# Patient Record
Sex: MALE | Race: WHITE | NOT HISPANIC OR LATINO
[De-identification: names, ages, dates, MRNs, and addresses within clinical notes are randomized per-mention and may not be internally consistent; named-entity substitution may affect disease eponyms.]

---

## 2017-05-03 ENCOUNTER — APPOINTMENT (OUTPATIENT)
Dept: INTERNAL MEDICINE | Facility: CLINIC | Age: 82
End: 2017-05-03

## 2017-05-03 ENCOUNTER — NON-APPOINTMENT (OUTPATIENT)
Age: 82
End: 2017-05-03

## 2017-05-03 VITALS
RESPIRATION RATE: 18 BRPM | HEIGHT: 69 IN | SYSTOLIC BLOOD PRESSURE: 100 MMHG | TEMPERATURE: 97.5 F | HEART RATE: 56 BPM | WEIGHT: 151.99 LBS | BODY MASS INDEX: 22.51 KG/M2 | OXYGEN SATURATION: 100 % | DIASTOLIC BLOOD PRESSURE: 66 MMHG

## 2017-05-03 DIAGNOSIS — I70.203 UNSPECIFIED ATHEROSCLEROSIS OF NATIVE ARTERIES OF EXTREMITIES, BILATERAL LEGS: ICD-10-CM

## 2017-05-03 DIAGNOSIS — K44.9 GASTRO-ESOPHAGEAL REFLUX DISEASE W/OUT ESOPHAGITIS: ICD-10-CM

## 2017-05-03 DIAGNOSIS — I65.21 OCCLUSION AND STENOSIS OF RIGHT CAROTID ARTERY: ICD-10-CM

## 2017-05-03 DIAGNOSIS — K21.9 GASTRO-ESOPHAGEAL REFLUX DISEASE W/OUT ESOPHAGITIS: ICD-10-CM

## 2017-05-03 DIAGNOSIS — D50.9 IRON DEFICIENCY ANEMIA, UNSPECIFIED: ICD-10-CM

## 2017-05-03 DIAGNOSIS — G45.9 TRANSIENT CEREBRAL ISCHEMIC ATTACK, UNSPECIFIED: ICD-10-CM

## 2017-05-03 DIAGNOSIS — I50.9 HEART FAILURE, UNSPECIFIED: ICD-10-CM

## 2017-05-03 RX ORDER — INSULIN HUMAN 100 [IU]/ML
100 INJECTION, SOLUTION PARENTERAL
Refills: 0 | Status: COMPLETED | COMMUNITY
End: 2017-05-03

## 2017-05-08 ENCOUNTER — APPOINTMENT (OUTPATIENT)
Dept: NEUROLOGY | Facility: CLINIC | Age: 82
End: 2017-05-08

## 2017-05-08 VITALS
WEIGHT: 152 LBS | DIASTOLIC BLOOD PRESSURE: 70 MMHG | BODY MASS INDEX: 22.51 KG/M2 | HEIGHT: 69 IN | SYSTOLIC BLOOD PRESSURE: 110 MMHG

## 2017-09-12 ENCOUNTER — RECORD ABSTRACTING (OUTPATIENT)
Age: 82
End: 2017-09-12

## 2017-09-12 DIAGNOSIS — Z86.73 PERSONAL HISTORY OF TRANSIENT ISCHEMIC ATTACK (TIA), AND CEREBRAL INFARCTION W/OUT RESIDUAL DEFICITS: ICD-10-CM

## 2017-09-12 DIAGNOSIS — Z92.89 PERSONAL HISTORY OF OTHER MEDICAL TREATMENT: ICD-10-CM

## 2017-09-12 DIAGNOSIS — Z83.1 FAMILY HISTORY OF OTHER INFECTIOUS AND PARASITIC DISEASES: ICD-10-CM

## 2017-09-14 ENCOUNTER — APPOINTMENT (OUTPATIENT)
Dept: INTERNAL MEDICINE | Facility: CLINIC | Age: 82
End: 2017-09-14
Payer: MEDICARE

## 2017-09-14 ENCOUNTER — RESULT CHARGE (OUTPATIENT)
Age: 82
End: 2017-09-14

## 2017-09-14 VITALS
SYSTOLIC BLOOD PRESSURE: 110 MMHG | HEART RATE: 65 BPM | RESPIRATION RATE: 20 BRPM | HEIGHT: 69 IN | DIASTOLIC BLOOD PRESSURE: 66 MMHG | WEIGHT: 146 LBS | OXYGEN SATURATION: 99 % | BODY MASS INDEX: 21.62 KG/M2 | TEMPERATURE: 98.7 F

## 2017-09-14 DIAGNOSIS — N18.2 CHRONIC KIDNEY DISEASE, STAGE 2 (MILD): ICD-10-CM

## 2017-09-14 DIAGNOSIS — I10 ESSENTIAL (PRIMARY) HYPERTENSION: ICD-10-CM

## 2017-09-14 DIAGNOSIS — R63.4 ABNORMAL WEIGHT LOSS: ICD-10-CM

## 2017-09-14 DIAGNOSIS — E78.00 PURE HYPERCHOLESTEROLEMIA, UNSPECIFIED: ICD-10-CM

## 2017-09-14 DIAGNOSIS — R31.29 OTHER MICROSCOPIC HEMATURIA: ICD-10-CM

## 2017-09-14 DIAGNOSIS — J44.9 CHRONIC OBSTRUCTIVE PULMONARY DISEASE, UNSPECIFIED: ICD-10-CM

## 2017-09-14 LAB
25(OH)D3 SERPL-MCNC: 44.3 NG/ML
ALBUMIN SERPL ELPH-MCNC: 4.3 G/DL
ALP BLD-CCNC: 61 U/L
ALT SERPL-CCNC: 23 U/L
ANION GAP SERPL CALC-SCNC: 19 MMOL/L
APPEARANCE: CLEAR
AST SERPL-CCNC: 21 U/L
BACTERIA: NEGATIVE
BASOPHILS # BLD AUTO: 0.02 K/UL
BASOPHILS NFR BLD AUTO: 0.5 %
BILIRUB SERPL-MCNC: 1.2 MG/DL
BILIRUBIN URINE: NEGATIVE
BLOOD URINE: NEGATIVE
BUN SERPL-MCNC: 57 MG/DL
CALCIUM SERPL-MCNC: 10.3 MG/DL
CHLORIDE SERPL-SCNC: 102 MMOL/L
CHOLEST SERPL-MCNC: 267 MG/DL
CHOLEST/HDLC SERPL: 4.2 RATIO
CO2 SERPL-SCNC: 25 MMOL/L
COLOR: YELLOW
CREAT SERPL-MCNC: 2.1 MG/DL
EOSINOPHIL # BLD AUTO: 0.07 K/UL
EOSINOPHIL NFR BLD AUTO: 1.7 %
FERRITIN SERPL-MCNC: 117 NG/ML
GLUCOSE BLDC GLUCOMTR-MCNC: 159
GLUCOSE QUALITATIVE U: NORMAL MG/DL
GLUCOSE SERPL-MCNC: 134 MG/DL
HCT VFR BLD CALC: 39.3 %
HDLC SERPL-MCNC: 64 MG/DL
HGB BLD-MCNC: 12.7 G/DL
IMM GRANULOCYTES NFR BLD AUTO: 0 %
IRON SATN MFR SERPL: 34 %
IRON SERPL-MCNC: 86 UG/DL
KETONES URINE: NEGATIVE
LDLC SERPL CALC-MCNC: 179 MG/DL
LEUKOCYTE ESTERASE URINE: NEGATIVE
LYMPHOCYTES # BLD AUTO: 0.6 K/UL
LYMPHOCYTES NFR BLD AUTO: 14.3 %
MAN DIFF?: NORMAL
MCHC RBC-ENTMCNC: 30.8 PG
MCHC RBC-ENTMCNC: 32.3 GM/DL
MCV RBC AUTO: 95.2 FL
MICROSCOPIC-UA: NORMAL
MONOCYTES # BLD AUTO: 0.33 K/UL
MONOCYTES NFR BLD AUTO: 7.9 %
NEUTROPHILS # BLD AUTO: 3.18 K/UL
NEUTROPHILS NFR BLD AUTO: 75.6 %
NITRITE URINE: NEGATIVE
PH URINE: 6
PLATELET # BLD AUTO: 140 K/UL
POTASSIUM SERPL-SCNC: 4.3 MMOL/L
PROT SERPL-MCNC: 6.9 G/DL
PROTEIN URINE: NEGATIVE MG/DL
RBC # BLD: 4.13 M/UL
RBC # FLD: 14.8 %
RED BLOOD CELLS URINE: 1 /HPF
SODIUM SERPL-SCNC: 146 MMOL/L
SPECIFIC GRAVITY URINE: 1.01
SQUAMOUS EPITHELIAL CELLS: 0 /HPF
TIBC SERPL-MCNC: 256 UG/DL
TRIGL SERPL-MCNC: 119 MG/DL
TSH SERPL-ACNC: 2.85 UIU/ML
UIBC SERPL-MCNC: 170 UG/DL
UROBILINOGEN URINE: NORMAL MG/DL
WBC # FLD AUTO: 4.2 K/UL
WHITE BLOOD CELLS URINE: 0 /HPF

## 2017-09-14 PROCEDURE — ZZZZZ: CPT

## 2017-09-14 PROCEDURE — 94729 DIFFUSING CAPACITY: CPT

## 2017-09-14 PROCEDURE — 99215 OFFICE O/P EST HI 40 MIN: CPT | Mod: 25

## 2017-09-14 PROCEDURE — 82962 GLUCOSE BLOOD TEST: CPT

## 2017-09-14 PROCEDURE — 94060 EVALUATION OF WHEEZING: CPT

## 2017-09-14 PROCEDURE — 94727 GAS DIL/WSHOT DETER LNG VOL: CPT

## 2017-09-14 RX ORDER — INSULIN HUMAN 100 [IU]/ML
100 INJECTION, SUSPENSION SUBCUTANEOUS
Refills: 0 | Status: COMPLETED | COMMUNITY
End: 2017-09-14

## 2017-09-14 RX ORDER — ASPIRIN 81 MG/1
81 TABLET ORAL DAILY
Qty: 100 | Refills: 2 | Status: COMPLETED | COMMUNITY
Start: 2017-09-14 | End: 2017-09-14

## 2017-11-29 ENCOUNTER — APPOINTMENT (OUTPATIENT)
Dept: NEUROLOGY | Facility: CLINIC | Age: 82
End: 2017-11-29
Payer: MEDICARE

## 2017-11-29 VITALS
SYSTOLIC BLOOD PRESSURE: 112 MMHG | DIASTOLIC BLOOD PRESSURE: 56 MMHG | WEIGHT: 138 LBS | BODY MASS INDEX: 20.44 KG/M2 | HEIGHT: 69 IN

## 2017-11-29 PROCEDURE — 99214 OFFICE O/P EST MOD 30 MIN: CPT

## 2017-11-29 RX ORDER — METOPROLOL SUCCINATE 25 MG/1
25 TABLET, EXTENDED RELEASE ORAL DAILY
Refills: 0 | Status: DISCONTINUED | COMMUNITY
End: 2017-11-29

## 2017-11-29 RX ORDER — ALFUZOSIN HYDROCHLORIDE 10 MG/1
10 TABLET, EXTENDED RELEASE ORAL
Refills: 0 | Status: DISCONTINUED | COMMUNITY
End: 2017-11-29

## 2018-01-24 ENCOUNTER — LABORATORY RESULT (OUTPATIENT)
Age: 83
End: 2018-01-24

## 2018-01-24 LAB
APPEARANCE: CLEAR
B BURGDOR IGG+IGM SER QL IB: NORMAL
BACTERIA: NEGATIVE
BASOPHILS # BLD AUTO: 0.01 K/UL
BASOPHILS NFR BLD AUTO: 0.3 %
BILIRUBIN URINE: NEGATIVE
BLOOD URINE: NEGATIVE
CHOLEST SERPL-MCNC: 187 MG/DL
CHOLEST/HDLC SERPL: 2.4 RATIO
CK SERPL-CCNC: 77 U/L
COLOR: YELLOW
CRP SERPL-MCNC: <0.2 MG/DL
EOSINOPHIL # BLD AUTO: 0.03 K/UL
EOSINOPHIL NFR BLD AUTO: 0.8 %
ERYTHROCYTE [SEDIMENTATION RATE] IN BLOOD BY WESTERGREN METHOD: 3 MM/HR
FERRITIN SERPL-MCNC: 238 NG/ML
FOLATE SERPL-MCNC: >20 NG/ML
GLUCOSE QUALITATIVE U: NEGATIVE MG/DL
HBA1C MFR BLD HPLC: 7.6 %
HCT VFR BLD CALC: 36.5 %
HDLC SERPL-MCNC: 79 MG/DL
HGB BLD-MCNC: 12.4 G/DL
HYALINE CASTS: 0 /LPF
IMM GRANULOCYTES NFR BLD AUTO: 0 %
IRON SATN MFR SERPL: 34 %
IRON SERPL-MCNC: 86 UG/DL
KETONES URINE: NEGATIVE
LDLC SERPL CALC-MCNC: 88 MG/DL
LEUKOCYTE ESTERASE URINE: NEGATIVE
LYMPHOCYTES # BLD AUTO: 0.52 K/UL
LYMPHOCYTES NFR BLD AUTO: 13 %
MAN DIFF?: NORMAL
MCHC RBC-ENTMCNC: 31.7 PG
MCHC RBC-ENTMCNC: 34 GM/DL
MCV RBC AUTO: 93.4 FL
MICROSCOPIC-UA: NORMAL
MONOCYTES # BLD AUTO: 0.28 K/UL
MONOCYTES NFR BLD AUTO: 7 %
NEUTROPHILS # BLD AUTO: 3.16 K/UL
NEUTROPHILS NFR BLD AUTO: 78.9 %
NITRITE URINE: NEGATIVE
PH URINE: 5
PLATELET # BLD AUTO: 148 K/UL
PROTEIN URINE: NEGATIVE MG/DL
RBC # BLD: 3.91 M/UL
RBC # FLD: 14.5 %
RED BLOOD CELLS URINE: 0 /HPF
SPECIFIC GRAVITY URINE: 1.02
SQUAMOUS EPITHELIAL CELLS: 0 /HPF
TIBC SERPL-MCNC: 255 UG/DL
TRIGL SERPL-MCNC: 98 MG/DL
TSH SERPL-ACNC: 2.15 UIU/ML
UIBC SERPL-MCNC: 169 UG/DL
UROBILINOGEN URINE: NEGATIVE MG/DL
VIT B12 SERPL-MCNC: 1694 PG/ML
WBC # FLD AUTO: 4 K/UL
WHITE BLOOD CELLS URINE: 0 /HPF

## 2018-01-26 LAB
ANA SER IF-ACNC: NEGATIVE
BACTERIA UR CULT: NORMAL

## 2018-01-29 LAB — VIT B6 SERPL-MCNC: 72.9 UG/L

## 2018-02-27 ENCOUNTER — OUTPATIENT (OUTPATIENT)
Dept: OUTPATIENT SERVICES | Facility: HOSPITAL | Age: 83
LOS: 1 days | Discharge: HOME | End: 2018-02-27
Payer: MEDICARE

## 2018-02-27 VITALS
OXYGEN SATURATION: 100 % | WEIGHT: 121.92 LBS | DIASTOLIC BLOOD PRESSURE: 68 MMHG | HEIGHT: 69 IN | SYSTOLIC BLOOD PRESSURE: 106 MMHG | TEMPERATURE: 97 F | HEART RATE: 59 BPM | RESPIRATION RATE: 20 BRPM

## 2018-02-27 DIAGNOSIS — G47.33 OBSTRUCTIVE SLEEP APNEA (ADULT) (PEDIATRIC): ICD-10-CM

## 2018-02-27 DIAGNOSIS — R93.5 ABNORMAL FINDINGS ON DIAGNOSTIC IMAGING OF OTHER ABDOMINAL REGIONS, INCLUDING RETROPERITONEUM: ICD-10-CM

## 2018-02-27 DIAGNOSIS — Z01.818 ENCOUNTER FOR OTHER PREPROCEDURAL EXAMINATION: ICD-10-CM

## 2018-02-27 DIAGNOSIS — K44.9 DIAPHRAGMATIC HERNIA WITHOUT OBSTRUCTION OR GANGRENE: ICD-10-CM

## 2018-02-27 DIAGNOSIS — Z98.89 OTHER SPECIFIED POSTPROCEDURAL STATES: Chronic | ICD-10-CM

## 2018-02-27 DIAGNOSIS — Z98.890 OTHER SPECIFIED POSTPROCEDURAL STATES: Chronic | ICD-10-CM

## 2018-02-27 DIAGNOSIS — Z95.1 PRESENCE OF AORTOCORONARY BYPASS GRAFT: Chronic | ICD-10-CM

## 2018-02-27 DIAGNOSIS — K25.4 CHRONIC OR UNSPECIFIED GASTRIC ULCER WITH HEMORRHAGE: ICD-10-CM

## 2018-02-27 DIAGNOSIS — E11.9 TYPE 2 DIABETES MELLITUS WITHOUT COMPLICATIONS: ICD-10-CM

## 2018-02-27 DIAGNOSIS — K31.9 DISEASE OF STOMACH AND DUODENUM, UNSPECIFIED: ICD-10-CM

## 2018-02-27 DIAGNOSIS — R63.4 ABNORMAL WEIGHT LOSS: ICD-10-CM

## 2018-02-27 DIAGNOSIS — Z95.1 PRESENCE OF AORTOCORONARY BYPASS GRAFT: ICD-10-CM

## 2018-02-27 DIAGNOSIS — I48.91 UNSPECIFIED ATRIAL FIBRILLATION: ICD-10-CM

## 2018-02-27 LAB
ANION GAP SERPL CALC-SCNC: 5 MMOL/L — SIGNIFICANT CHANGE UP (ref 5–17)
APTT BLD: 50.2 SEC — HIGH (ref 27.5–37.4)
BASOPHILS # BLD AUTO: 0 K/UL — SIGNIFICANT CHANGE UP (ref 0–0.2)
BASOPHILS NFR BLD AUTO: 0.8 % — SIGNIFICANT CHANGE UP (ref 0–2)
BUN SERPL-MCNC: 71 MG/DL — HIGH (ref 7–23)
CALCIUM SERPL-MCNC: 9.9 MG/DL — SIGNIFICANT CHANGE UP (ref 8.5–10.1)
CHLORIDE SERPL-SCNC: 100 MMOL/L — SIGNIFICANT CHANGE UP (ref 96–108)
CO2 SERPL-SCNC: 31 MMOL/L — SIGNIFICANT CHANGE UP (ref 22–31)
CREAT SERPL-MCNC: 1.69 MG/DL — HIGH (ref 0.5–1.3)
EOSINOPHIL # BLD AUTO: 0.1 K/UL — SIGNIFICANT CHANGE UP (ref 0–0.5)
EOSINOPHIL NFR BLD AUTO: 1 % — SIGNIFICANT CHANGE UP (ref 0–6)
GLUCOSE SERPL-MCNC: 153 MG/DL — HIGH (ref 70–99)
HCT VFR BLD CALC: 42 % — SIGNIFICANT CHANGE UP (ref 39–50)
HGB BLD-MCNC: 14.5 G/DL — SIGNIFICANT CHANGE UP (ref 13–17)
INR BLD: 3.23 RATIO — HIGH (ref 0.88–1.16)
LYMPHOCYTES # BLD AUTO: 0.5 K/UL — LOW (ref 1–3.3)
LYMPHOCYTES # BLD AUTO: 8.4 % — LOW (ref 13–44)
MCHC RBC-ENTMCNC: 32.5 PG — SIGNIFICANT CHANGE UP (ref 27–34)
MCHC RBC-ENTMCNC: 34.4 GM/DL — SIGNIFICANT CHANGE UP (ref 32–36)
MCV RBC AUTO: 94.5 FL — SIGNIFICANT CHANGE UP (ref 80–100)
MONOCYTES # BLD AUTO: 0.3 K/UL — SIGNIFICANT CHANGE UP (ref 0–0.9)
MONOCYTES NFR BLD AUTO: 5.6 % — SIGNIFICANT CHANGE UP (ref 2–14)
NEUTROPHILS # BLD AUTO: 4.7 K/UL — SIGNIFICANT CHANGE UP (ref 1.8–7.4)
NEUTROPHILS NFR BLD AUTO: 84.3 % — HIGH (ref 43–77)
PLATELET # BLD AUTO: 180 K/UL — SIGNIFICANT CHANGE UP (ref 150–400)
POTASSIUM SERPL-MCNC: 4.3 MMOL/L — SIGNIFICANT CHANGE UP (ref 3.5–5.3)
POTASSIUM SERPL-SCNC: 4.3 MMOL/L — SIGNIFICANT CHANGE UP (ref 3.5–5.3)
PROTHROM AB SERPL-ACNC: 35.7 SEC — HIGH (ref 9.8–12.7)
RBC # BLD: 4.45 M/UL — SIGNIFICANT CHANGE UP (ref 4.2–5.8)
RBC # FLD: 13.4 % — SIGNIFICANT CHANGE UP (ref 10.3–14.5)
SODIUM SERPL-SCNC: 136 MMOL/L — SIGNIFICANT CHANGE UP (ref 135–145)
WBC # BLD: 5.6 K/UL — SIGNIFICANT CHANGE UP (ref 3.8–10.5)
WBC # FLD AUTO: 5.6 K/UL — SIGNIFICANT CHANGE UP (ref 3.8–10.5)

## 2018-02-27 PROCEDURE — 93010 ELECTROCARDIOGRAM REPORT: CPT

## 2018-02-27 NOTE — H&P PST ADULT - ASSESSMENT
84 y/o male with complain of weight loss and abnormal abdominal imaging studies. Scheduled for EGD with Dr. Su.    Plan:    1. NPO post midnight  2. Follow preop medication (coumadin) instructions from Dr. Su

## 2018-02-27 NOTE — H&P PST ADULT - NSANTHOSAYNRD_GEN_A_CORE
Patient is calling to request an order to be placed for Dr. Nilton Wooten at the [facility] location.  Patient is asking that we call her back when the order is place so she knows when to call Dr. Wooten office for an appointment.   Yes

## 2018-02-27 NOTE — H&P PST ADULT - PMH
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

## 2018-02-27 NOTE — H&P PST ADULT - HISTORY OF PRESENT ILLNESS
84 y/o male with complain of weight loss and abnormal abdominal imaging studies. Denies abdominal pain, no N/V. Here today for PST for EGD.

## 2018-02-27 NOTE — H&P PST ADULT - PSH
H/O cystoscopy  TURP  H/O heart surgery  Clip  History of hernia surgery    History of tonsillectomy    S/P CABG x 4

## 2018-03-07 ENCOUNTER — RESULT REVIEW (OUTPATIENT)
Age: 83
End: 2018-03-07

## 2018-03-07 ENCOUNTER — OUTPATIENT (OUTPATIENT)
Dept: OUTPATIENT SERVICES | Facility: HOSPITAL | Age: 83
LOS: 1 days | Discharge: HOME | End: 2018-03-07
Payer: MEDICARE

## 2018-03-07 VITALS
HEART RATE: 51 BPM | DIASTOLIC BLOOD PRESSURE: 79 MMHG | SYSTOLIC BLOOD PRESSURE: 141 MMHG | RESPIRATION RATE: 16 BRPM | HEIGHT: 69 IN | WEIGHT: 128.09 LBS | OXYGEN SATURATION: 100 % | TEMPERATURE: 97 F

## 2018-03-07 DIAGNOSIS — Z98.890 OTHER SPECIFIED POSTPROCEDURAL STATES: Chronic | ICD-10-CM

## 2018-03-07 DIAGNOSIS — Z98.89 OTHER SPECIFIED POSTPROCEDURAL STATES: Chronic | ICD-10-CM

## 2018-03-07 DIAGNOSIS — Z95.1 PRESENCE OF AORTOCORONARY BYPASS GRAFT: Chronic | ICD-10-CM

## 2018-03-07 PROCEDURE — 88305 TISSUE EXAM BY PATHOLOGIST: CPT | Mod: 26

## 2018-03-07 RX ORDER — SODIUM CHLORIDE 9 MG/ML
1000 INJECTION INTRAMUSCULAR; INTRAVENOUS; SUBCUTANEOUS
Qty: 0 | Refills: 0 | Status: DISCONTINUED | OUTPATIENT
Start: 2018-03-07 | End: 2018-03-22

## 2018-03-07 NOTE — ASU PATIENT PROFILE, ADULT - PMH
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

## 2018-03-08 LAB — SURGICAL PATHOLOGY FINAL REPORT - CH: SIGNIFICANT CHANGE UP

## 2018-03-14 DIAGNOSIS — Z85.828 PERSONAL HISTORY OF OTHER MALIGNANT NEOPLASM OF SKIN: ICD-10-CM

## 2018-03-14 DIAGNOSIS — Z86.73 PERSONAL HISTORY OF TRANSIENT ISCHEMIC ATTACK (TIA), AND CEREBRAL INFARCTION WITHOUT RESIDUAL DEFICITS: ICD-10-CM

## 2018-03-14 DIAGNOSIS — Z79.01 LONG TERM (CURRENT) USE OF ANTICOAGULANTS: ICD-10-CM

## 2018-03-14 DIAGNOSIS — I48.91 UNSPECIFIED ATRIAL FIBRILLATION: ICD-10-CM

## 2018-03-14 DIAGNOSIS — R93.3 ABNORMAL FINDINGS ON DIAGNOSTIC IMAGING OF OTHER PARTS OF DIGESTIVE TRACT: ICD-10-CM

## 2018-03-14 DIAGNOSIS — K25.4 CHRONIC OR UNSPECIFIED GASTRIC ULCER WITH HEMORRHAGE: ICD-10-CM

## 2018-03-14 DIAGNOSIS — E78.00 PURE HYPERCHOLESTEROLEMIA, UNSPECIFIED: ICD-10-CM

## 2018-03-14 DIAGNOSIS — Z79.4 LONG TERM (CURRENT) USE OF INSULIN: ICD-10-CM

## 2018-03-14 DIAGNOSIS — Z86.010 PERSONAL HISTORY OF COLONIC POLYPS: ICD-10-CM

## 2018-03-14 DIAGNOSIS — K44.9 DIAPHRAGMATIC HERNIA WITHOUT OBSTRUCTION OR GANGRENE: ICD-10-CM

## 2018-03-14 DIAGNOSIS — E11.9 TYPE 2 DIABETES MELLITUS WITHOUT COMPLICATIONS: ICD-10-CM

## 2018-03-14 DIAGNOSIS — K31.9 DISEASE OF STOMACH AND DUODENUM, UNSPECIFIED: ICD-10-CM

## 2018-03-14 DIAGNOSIS — G47.33 OBSTRUCTIVE SLEEP APNEA (ADULT) (PEDIATRIC): ICD-10-CM

## 2018-03-14 DIAGNOSIS — R63.4 ABNORMAL WEIGHT LOSS: ICD-10-CM

## 2018-03-14 DIAGNOSIS — Z95.1 PRESENCE OF AORTOCORONARY BYPASS GRAFT: ICD-10-CM

## 2018-03-15 ENCOUNTER — APPOINTMENT (OUTPATIENT)
Dept: INTERNAL MEDICINE | Facility: CLINIC | Age: 83
End: 2018-03-15
Payer: MEDICARE

## 2018-03-15 VITALS
SYSTOLIC BLOOD PRESSURE: 142 MMHG | BODY MASS INDEX: 18.37 KG/M2 | OXYGEN SATURATION: 99 % | HEART RATE: 56 BPM | WEIGHT: 124 LBS | TEMPERATURE: 97.4 F | HEIGHT: 69 IN | RESPIRATION RATE: 14 BRPM | DIASTOLIC BLOOD PRESSURE: 80 MMHG

## 2018-03-15 DIAGNOSIS — Z00.00 ENCOUNTER FOR GENERAL ADULT MEDICAL EXAMINATION W/OUT ABNORMAL FINDINGS: ICD-10-CM

## 2018-03-15 PROCEDURE — G0438: CPT

## 2018-03-15 RX ORDER — DUTASTERIDE 0.5 MG/1
0.5 CAPSULE, LIQUID FILLED ORAL
Refills: 0 | Status: DISCONTINUED | COMMUNITY
End: 2018-03-15

## 2018-03-27 ENCOUNTER — EMERGENCY (EMERGENCY)
Facility: HOSPITAL | Age: 83
LOS: 0 days | Discharge: HOME | End: 2018-03-27
Attending: EMERGENCY MEDICINE | Admitting: EMERGENCY MEDICINE
Payer: MEDICARE

## 2018-03-27 VITALS
WEIGHT: 149.91 LBS | HEIGHT: 72 IN | RESPIRATION RATE: 25 BRPM | DIASTOLIC BLOOD PRESSURE: 73 MMHG | TEMPERATURE: 99 F | SYSTOLIC BLOOD PRESSURE: 143 MMHG | HEART RATE: 73 BPM

## 2018-03-27 DIAGNOSIS — Z95.1 PRESENCE OF AORTOCORONARY BYPASS GRAFT: ICD-10-CM

## 2018-03-27 DIAGNOSIS — E78.5 HYPERLIPIDEMIA, UNSPECIFIED: ICD-10-CM

## 2018-03-27 DIAGNOSIS — Z79.84 LONG TERM (CURRENT) USE OF ORAL HYPOGLYCEMIC DRUGS: ICD-10-CM

## 2018-03-27 DIAGNOSIS — N40.0 BENIGN PROSTATIC HYPERPLASIA WITHOUT LOWER URINARY TRACT SYMPTOMS: ICD-10-CM

## 2018-03-27 DIAGNOSIS — I12.9 HYPERTENSIVE CHRONIC KIDNEY DISEASE WITH STAGE 1 THROUGH STAGE 4 CHRONIC KIDNEY DISEASE, OR UNSPECIFIED CHRONIC KIDNEY DISEASE: ICD-10-CM
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
## 2018-03-27 DIAGNOSIS — S01.81XA LACERATION WITHOUT FOREIGN BODY OF OTHER PART OF HEAD, INITIAL ENCOUNTER: ICD-10-CM

## 2018-03-27 DIAGNOSIS — I48.2 CHRONIC ATRIAL FIBRILLATION: ICD-10-CM

## 2018-03-27 DIAGNOSIS — E11.22 TYPE 2 DIABETES MELLITUS WITH DIABETIC CHRONIC KIDNEY DISEASE: ICD-10-CM

## 2018-03-27 DIAGNOSIS — I25.10 ATHEROSCLEROTIC HEART DISEASE OF NATIVE CORONARY ARTERY WITHOUT ANGINA PECTORIS: ICD-10-CM

## 2018-03-27 DIAGNOSIS — S01.01XA LACERATION WITHOUT FOREIGN BODY OF SCALP, INITIAL ENCOUNTER: ICD-10-CM

## 2018-03-27 DIAGNOSIS — Q27.30 ARTERIOVENOUS MALFORMATION, SITE UNSPECIFIED: ICD-10-CM

## 2018-03-27 DIAGNOSIS — Z98.890 OTHER SPECIFIED POSTPROCEDURAL STATES: Chronic | ICD-10-CM

## 2018-03-27 DIAGNOSIS — R55 SYNCOPE AND COLLAPSE: ICD-10-CM

## 2018-03-27 DIAGNOSIS — Z98.89 OTHER SPECIFIED POSTPROCEDURAL STATES: Chronic | ICD-10-CM

## 2018-03-27 DIAGNOSIS — Z95.1 PRESENCE OF AORTOCORONARY BYPASS GRAFT: Chronic | ICD-10-CM

## 2018-03-27 DIAGNOSIS — Z79.01 LONG TERM (CURRENT) USE OF ANTICOAGULANTS: ICD-10-CM
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
## 2018-03-27 DIAGNOSIS — Z79.899 OTHER LONG TERM (CURRENT) DRUG THERAPY: ICD-10-CM

## 2018-03-27 LAB
ALBUMIN SERPL ELPH-MCNC: 3.6 G/DL — SIGNIFICANT CHANGE UP (ref 3.3–5)
ALP SERPL-CCNC: 77 U/L — SIGNIFICANT CHANGE UP (ref 40–120)
ALT FLD-CCNC: 31 U/L — SIGNIFICANT CHANGE UP (ref 12–78)
ANION GAP SERPL CALC-SCNC: 9 MMOL/L — SIGNIFICANT CHANGE UP (ref 5–17)
APTT BLD: 34.7 SEC — SIGNIFICANT CHANGE UP (ref 27.5–37.4)
AST SERPL-CCNC: 21 U/L — SIGNIFICANT CHANGE UP (ref 15–37)
BASOPHILS # BLD AUTO: 0.02 K/UL — SIGNIFICANT CHANGE UP (ref 0–0.2)
BASOPHILS NFR BLD AUTO: 0.3 % — SIGNIFICANT CHANGE UP (ref 0–2)
BILIRUB SERPL-MCNC: 1.2 MG/DL — SIGNIFICANT CHANGE UP (ref 0.2–1.2)
BUN SERPL-MCNC: 90 MG/DL — HIGH (ref 7–23)
CALCIUM SERPL-MCNC: 9.5 MG/DL — SIGNIFICANT CHANGE UP (ref 8.5–10.1)
CHLORIDE SERPL-SCNC: 92 MMOL/L — LOW (ref 96–108)
CK SERPL-CCNC: 77 U/L — SIGNIFICANT CHANGE UP (ref 26–308)
CO2 SERPL-SCNC: 34 MMOL/L — HIGH (ref 22–31)
CREAT SERPL-MCNC: 1.84 MG/DL — HIGH (ref 0.5–1.3)
EOSINOPHIL # BLD AUTO: 0.05 K/UL — SIGNIFICANT CHANGE UP (ref 0–0.5)
EOSINOPHIL NFR BLD AUTO: 0.8 % — SIGNIFICANT CHANGE UP (ref 0–6)
GLUCOSE BLDC GLUCOMTR-MCNC: 248 MG/DL — HIGH (ref 70–99)
GLUCOSE SERPL-MCNC: 203 MG/DL — HIGH (ref 70–99)
HCT VFR BLD CALC: 35.6 % — LOW (ref 39–50)
HGB BLD-MCNC: 12.3 G/DL — LOW (ref 13–17)
IMM GRANULOCYTES NFR BLD AUTO: 0.8 % — SIGNIFICANT CHANGE UP (ref 0–1.5)
INR BLD: 1.71 RATIO — HIGH (ref 0.88–1.16)
LYMPHOCYTES # BLD AUTO: 0.32 K/UL — LOW (ref 1–3.3)
LYMPHOCYTES # BLD AUTO: 5 % — LOW (ref 13–44)
MCHC RBC-ENTMCNC: 33.2 PG — SIGNIFICANT CHANGE UP (ref 27–34)
MCHC RBC-ENTMCNC: 34.6 GM/DL — SIGNIFICANT CHANGE UP (ref 32–36)
MCV RBC AUTO: 96.2 FL — SIGNIFICANT CHANGE UP (ref 80–100)
MONOCYTES # BLD AUTO: 0.44 K/UL — SIGNIFICANT CHANGE UP (ref 0–0.9)
MONOCYTES NFR BLD AUTO: 6.8 % — SIGNIFICANT CHANGE UP (ref 2–14)
NEUTROPHILS # BLD AUTO: 5.57 K/UL — SIGNIFICANT CHANGE UP (ref 1.8–7.4)
NEUTROPHILS NFR BLD AUTO: 86.3 % — HIGH (ref 43–77)
NRBC # BLD: 0 /100 WBCS — SIGNIFICANT CHANGE UP (ref 0–0)
PLATELET # BLD AUTO: 180 K/UL — SIGNIFICANT CHANGE UP (ref 150–400)
POTASSIUM SERPL-MCNC: 4.6 MMOL/L — SIGNIFICANT CHANGE UP (ref 3.5–5.3)
POTASSIUM SERPL-SCNC: 4.6 MMOL/L — SIGNIFICANT CHANGE UP (ref 3.5–5.3)
PROT SERPL-MCNC: 7.1 GM/DL — SIGNIFICANT CHANGE UP (ref 6–8.3)
PROTHROM AB SERPL-ACNC: 18.7 SEC — HIGH (ref 9.8–12.7)
RBC # BLD: 3.7 M/UL — LOW (ref 4.2–5.8)
RBC # FLD: 14.2 % — SIGNIFICANT CHANGE UP (ref 10.3–14.5)
SODIUM SERPL-SCNC: 135 MMOL/L — SIGNIFICANT CHANGE UP (ref 135–145)
TROPONIN I SERPL-MCNC: <0.015 NG/ML — SIGNIFICANT CHANGE UP (ref 0.01–0.04)
WBC # BLD: 6.45 K/UL — SIGNIFICANT CHANGE UP (ref 3.8–10.5)
WBC # FLD AUTO: 6.45 K/UL — SIGNIFICANT CHANGE UP (ref 3.8–10.5)

## 2018-03-27 PROCEDURE — 70450 CT HEAD/BRAIN W/O DYE: CPT | Mod: 26

## 2018-03-27 PROCEDURE — 12002 RPR S/N/AX/GEN/TRNK2.6-7.5CM: CPT

## 2018-03-27 PROCEDURE — 99285 EMERGENCY DEPT VISIT HI MDM: CPT | Mod: 25

## 2018-03-27 PROCEDURE — 72125 CT NECK SPINE W/O DYE: CPT | Mod: 26

## 2018-03-27 PROCEDURE — 93010 ELECTROCARDIOGRAM REPORT: CPT

## 2018-03-27 RX ORDER — TETANUS TOXOID, REDUCED DIPHTHERIA TOXOID AND ACELLULAR PERTUSSIS VACCINE, ADSORBED 5; 2.5; 8; 8; 2.5 [IU]/.5ML; [IU]/.5ML; UG/.5ML; UG/.5ML; UG/.5ML
0.5 SUSPENSION INTRAMUSCULAR ONCE
Qty: 0 | Refills: 0 | Status: COMPLETED | OUTPATIENT
Start: 2018-03-27 | End: 2018-03-27

## 2018-03-27 RX ORDER — SIMVASTATIN 20 MG/1
1 TABLET, FILM COATED ORAL
Qty: 0 | Refills: 0 | COMMUNITY

## 2018-03-27 RX ORDER — SODIUM CHLORIDE 9 MG/ML
3 INJECTION INTRAMUSCULAR; INTRAVENOUS; SUBCUTANEOUS ONCE
Qty: 0 | Refills: 0 | Status: COMPLETED | OUTPATIENT
Start: 2018-03-27 | End: 2018-03-27

## 2018-03-27 RX ORDER — WARFARIN SODIUM 2.5 MG/1
1 TABLET ORAL
Qty: 0 | Refills: 0 | COMMUNITY

## 2018-03-27 RX ORDER — UBIDECARENONE 100 MG
100 CAPSULE ORAL
Qty: 0 | Refills: 0 | COMMUNITY

## 2018-03-27 RX ADMIN — TETANUS TOXOID, REDUCED DIPHTHERIA TOXOID AND ACELLULAR PERTUSSIS VACCINE, ADSORBED 0.5 MILLILITER(S): 5; 2.5; 8; 8; 2.5 SUSPENSION INTRAMUSCULAR at 18:49

## 2018-03-27 RX ADMIN — SODIUM CHLORIDE 3 MILLILITER(S): 9 INJECTION INTRAMUSCULAR; INTRAVENOUS; SUBCUTANEOUS at 18:50

## 2018-03-27 NOTE — ED ADULT NURSE NOTE - CHIEF COMPLAINT QUOTE
patient presents in ED brought in by car s/p fall in kitchen at home unwitnessed. patient is on blood thinners. history of severe dementia as per wife . head laceration present. trauma alert called. direct bedded into trauma room.

## 2018-03-27 NOTE — ED ADULT TRIAGE NOTE - CHIEF COMPLAINT QUOTE
patient presents in ED brought in by car s/p fall in kitchen at home unwitnessed. patient is on blood thinners. history of severe dementia as per wife . head laceration present. trauma alert called. patient presents in ED brought in by car s/p fall in kitchen at home unwitnessed. patient is on blood thinners. history of severe dementia as per wife . head laceration present. trauma alert called. direct bedded into trauma room.

## 2018-03-27 NOTE — ED PROVIDER NOTE - SKIN, MLM
Skin normal color for race, warm, dry and intact. No evidence of rash. Skin normal color for race, warm.. No evidence of rash. 4 cm linear lac to left posterior parietal scalp.

## 2018-03-27 NOTE — ED PROVIDER NOTE - CONSTITUTIONAL, MLM
normal... Well appearing, well nourished, awake, alert, oriented to person, place, time/situation and in no apparent distress. Faint, chronically ill appearing, awake, alert, oriented x1 and in mild distress.

## 2018-03-27 NOTE — ED ADULT NURSE REASSESSMENT NOTE - NS ED NURSE REASSESS COMMENT FT1
patient discharged home with family. iv taken out. ambulatory with assistance. patient clothing changed. written and verbal discharge and followup instructions given to patient and family, patient and family verbalized back understanding. additional verbal instructions given. patient discharged home from ED at 2203

## 2018-03-27 NOTE — ED PROCEDURE NOTE - ATTENDING CONTRIBUTION TO CARE
I, Magdalena Camilo MD, personally saw the patient with the resident, and completed the key components of the history and physical exam. I then discussed the management plan with the resident.

## 2018-03-27 NOTE — ED PROVIDER NOTE - OBJECTIVE STATEMENT
84 y/o m with PMHx of AVM, chronic afib, CKD, CAD s/p CABG x4 on Coumadin, HLD, HTN, dementia, BPH, DM presenting to the ED c/o unwitnessed fall s/p syncope with head laceration. Pt's wife states pt was getting something to eat at home when he syncopized and fell, hitting his head on unknown surface, with bleeding from the wound. PCP Dr. Nixon.

## 2018-03-27 NOTE — ED PROVIDER NOTE - PROGRESS NOTE DETAILS
pt remains at baseline. ct head negative. wound remains hemostatic after lac repair. family agreeabel to dc home. INR 1.7. hold coumadin for 2 days. MD German

## 2018-03-27 NOTE — ED PROVIDER NOTE - NEUROLOGICAL, MLM
Alert and oriented, no focal deficits, no motor or sensory deficits. Alert and oriented x1, no focal deficits, no motor or sensory deficits.

## 2018-03-28 ENCOUNTER — INPATIENT (INPATIENT)
Facility: HOSPITAL | Age: 83
LOS: 9 days | Discharge: NURSING, REHAB OR LONG-TERM CARE | End: 2018-04-07
Attending: FAMILY MEDICINE | Admitting: FAMILY MEDICINE
Payer: MEDICARE

## 2018-03-28 VITALS
SYSTOLIC BLOOD PRESSURE: 110 MMHG | WEIGHT: 139.99 LBS | RESPIRATION RATE: 14 BRPM | OXYGEN SATURATION: 100 % | TEMPERATURE: 98 F | HEIGHT: 69 IN | DIASTOLIC BLOOD PRESSURE: 66 MMHG | HEART RATE: 70 BPM

## 2018-03-28 DIAGNOSIS — Z98.890 OTHER SPECIFIED POSTPROCEDURAL STATES: Chronic | ICD-10-CM

## 2018-03-28 DIAGNOSIS — Z95.1 PRESENCE OF AORTOCORONARY BYPASS GRAFT: Chronic | ICD-10-CM

## 2018-03-28 DIAGNOSIS — Z98.89 OTHER SPECIFIED POSTPROCEDURAL STATES: Chronic | ICD-10-CM

## 2018-03-28 LAB
ADD ON TEST-SPECIMEN IN LAB: SIGNIFICANT CHANGE UP
ALBUMIN SERPL ELPH-MCNC: 3.3 G/DL — SIGNIFICANT CHANGE UP (ref 3.3–5)
ALP SERPL-CCNC: 64 U/L — SIGNIFICANT CHANGE UP (ref 40–120)
ALT FLD-CCNC: 23 U/L — SIGNIFICANT CHANGE UP (ref 12–78)
ANION GAP SERPL CALC-SCNC: 7 MMOL/L — SIGNIFICANT CHANGE UP (ref 5–17)
APPEARANCE UR: CLEAR — SIGNIFICANT CHANGE UP
APTT BLD: 30.9 SEC — SIGNIFICANT CHANGE UP (ref 27.5–37.4)
AST SERPL-CCNC: 19 U/L — SIGNIFICANT CHANGE UP (ref 15–37)
BASOPHILS # BLD AUTO: 0.01 K/UL — SIGNIFICANT CHANGE UP (ref 0–0.2)
BASOPHILS NFR BLD AUTO: 0.1 % — SIGNIFICANT CHANGE UP (ref 0–2)
BILIRUB SERPL-MCNC: 0.8 MG/DL — SIGNIFICANT CHANGE UP (ref 0.2–1.2)
BILIRUB UR-MCNC: NEGATIVE — SIGNIFICANT CHANGE UP
BLD GP AB SCN SERPL QL: SIGNIFICANT CHANGE UP
BUN SERPL-MCNC: 96 MG/DL — HIGH (ref 7–23)
CALCIUM SERPL-MCNC: 9 MG/DL — SIGNIFICANT CHANGE UP (ref 8.5–10.1)
CHLORIDE SERPL-SCNC: 95 MMOL/L — LOW (ref 96–108)
CO2 SERPL-SCNC: 33 MMOL/L — HIGH (ref 22–31)
COLOR SPEC: YELLOW — SIGNIFICANT CHANGE UP
CREAT SERPL-MCNC: 1.83 MG/DL — HIGH (ref 0.5–1.3)
DIFF PNL FLD: NEGATIVE — SIGNIFICANT CHANGE UP
EOSINOPHIL # BLD AUTO: 0.02 K/UL — SIGNIFICANT CHANGE UP (ref 0–0.5)
EOSINOPHIL NFR BLD AUTO: 0.3 % — SIGNIFICANT CHANGE UP (ref 0–6)
GLUCOSE BLDC GLUCOMTR-MCNC: 228 MG/DL — HIGH (ref 70–99)
GLUCOSE BLDC GLUCOMTR-MCNC: 279 MG/DL — HIGH (ref 70–99)
GLUCOSE SERPL-MCNC: 229 MG/DL — HIGH (ref 70–99)
GLUCOSE UR QL: NEGATIVE MG/DL — SIGNIFICANT CHANGE UP
HCT VFR BLD CALC: 29.1 % — LOW (ref 39–50)
HGB BLD-MCNC: 10 G/DL — LOW (ref 13–17)
IMM GRANULOCYTES NFR BLD AUTO: 0.6 % — SIGNIFICANT CHANGE UP (ref 0–1.5)
INR BLD: 1.85 RATIO — HIGH (ref 0.88–1.16)
KETONES UR-MCNC: NEGATIVE — SIGNIFICANT CHANGE UP
LEUKOCYTE ESTERASE UR-ACNC: NEGATIVE — SIGNIFICANT CHANGE UP
LYMPHOCYTES # BLD AUTO: 0.2 K/UL — LOW (ref 1–3.3)
LYMPHOCYTES # BLD AUTO: 2.5 % — LOW (ref 13–44)
MCHC RBC-ENTMCNC: 33.3 PG — SIGNIFICANT CHANGE UP (ref 27–34)
MCHC RBC-ENTMCNC: 34.4 GM/DL — SIGNIFICANT CHANGE UP (ref 32–36)
MCV RBC AUTO: 97 FL — SIGNIFICANT CHANGE UP (ref 80–100)
MONOCYTES # BLD AUTO: 0.4 K/UL — SIGNIFICANT CHANGE UP (ref 0–0.9)
MONOCYTES NFR BLD AUTO: 5 % — SIGNIFICANT CHANGE UP (ref 2–14)
NEUTROPHILS # BLD AUTO: 7.32 K/UL — SIGNIFICANT CHANGE UP (ref 1.8–7.4)
NEUTROPHILS NFR BLD AUTO: 91.5 % — HIGH (ref 43–77)
NITRITE UR-MCNC: NEGATIVE — SIGNIFICANT CHANGE UP
NRBC # BLD: 0 /100 WBCS — SIGNIFICANT CHANGE UP (ref 0–0)
PH UR: 5 — SIGNIFICANT CHANGE UP (ref 5–8)
PLATELET # BLD AUTO: 165 K/UL — SIGNIFICANT CHANGE UP (ref 150–400)
POTASSIUM SERPL-MCNC: 4.5 MMOL/L — SIGNIFICANT CHANGE UP (ref 3.5–5.3)
POTASSIUM SERPL-SCNC: 4.5 MMOL/L — SIGNIFICANT CHANGE UP (ref 3.5–5.3)
PROT SERPL-MCNC: 6.3 GM/DL — SIGNIFICANT CHANGE UP (ref 6–8.3)
PROT UR-MCNC: NEGATIVE MG/DL — SIGNIFICANT CHANGE UP
PROTHROM AB SERPL-ACNC: 20.2 SEC — HIGH (ref 9.8–12.7)
RBC # BLD: 3 M/UL — LOW (ref 4.2–5.8)
RBC # FLD: 14.5 % — SIGNIFICANT CHANGE UP (ref 10.3–14.5)
SODIUM SERPL-SCNC: 135 MMOL/L — SIGNIFICANT CHANGE UP (ref 135–145)
SP GR SPEC: 1.01 — SIGNIFICANT CHANGE UP (ref 1.01–1.02)
TROPONIN I SERPL-MCNC: <0.015 NG/ML — SIGNIFICANT CHANGE UP (ref 0.01–0.04)
TROPONIN I SERPL-MCNC: <0.015 NG/ML — SIGNIFICANT CHANGE UP (ref 0.01–0.04)
TSH SERPL-MCNC: 0.67 UU/ML — SIGNIFICANT CHANGE UP (ref 0.36–3.74)
TYPE + AB SCN PNL BLD: SIGNIFICANT CHANGE UP
UROBILINOGEN FLD QL: NEGATIVE MG/DL — SIGNIFICANT CHANGE UP
WBC # BLD: 8 K/UL — SIGNIFICANT CHANGE UP (ref 3.8–10.5)
WBC # FLD AUTO: 8 K/UL — SIGNIFICANT CHANGE UP (ref 3.8–10.5)

## 2018-03-28 PROCEDURE — 72125 CT NECK SPINE W/O DYE: CPT | Mod: 26

## 2018-03-28 PROCEDURE — 71045 X-RAY EXAM CHEST 1 VIEW: CPT | Mod: 26

## 2018-03-28 PROCEDURE — 70450 CT HEAD/BRAIN W/O DYE: CPT | Mod: 26

## 2018-03-28 PROCEDURE — 99285 EMERGENCY DEPT VISIT HI MDM: CPT

## 2018-03-28 PROCEDURE — 73030 X-RAY EXAM OF SHOULDER: CPT | Mod: 26,RT

## 2018-03-28 PROCEDURE — 71250 CT THORAX DX C-: CPT | Mod: 26

## 2018-03-28 PROCEDURE — 93010 ELECTROCARDIOGRAM REPORT: CPT

## 2018-03-28 PROCEDURE — 74176 CT ABD & PELVIS W/O CONTRAST: CPT | Mod: 26

## 2018-03-28 RX ORDER — WARFARIN SODIUM 2.5 MG/1
1 TABLET ORAL
Qty: 0 | Refills: 0 | COMMUNITY

## 2018-03-28 RX ORDER — SODIUM CHLORIDE 9 MG/ML
1000 INJECTION INTRAMUSCULAR; INTRAVENOUS; SUBCUTANEOUS
Qty: 0 | Refills: 0 | Status: DISCONTINUED | OUTPATIENT
Start: 2018-03-28 | End: 2018-04-03

## 2018-03-28 RX ORDER — FERROUS SULFATE 325(65) MG
0 TABLET ORAL
Qty: 0 | Refills: 0 | COMMUNITY

## 2018-03-28 RX ORDER — PANTOPRAZOLE SODIUM 20 MG/1
1 TABLET, DELAYED RELEASE ORAL
Qty: 0 | Refills: 0 | COMMUNITY

## 2018-03-28 RX ORDER — DEXTROSE 50 % IN WATER 50 %
25 SYRINGE (ML) INTRAVENOUS ONCE
Qty: 0 | Refills: 0 | Status: DISCONTINUED | OUTPATIENT
Start: 2018-03-28 | End: 2018-04-07

## 2018-03-28 RX ORDER — UBIDECARENONE 100 MG
1 CAPSULE ORAL
Qty: 0 | Refills: 0 | COMMUNITY

## 2018-03-28 RX ORDER — WARFARIN SODIUM 2.5 MG/1
4 TABLET ORAL DAILY
Qty: 0 | Refills: 0 | Status: COMPLETED | OUTPATIENT
Start: 2018-03-28 | End: 2018-03-30

## 2018-03-28 RX ORDER — DEXTROSE 50 % IN WATER 50 %
12.5 SYRINGE (ML) INTRAVENOUS ONCE
Qty: 0 | Refills: 0 | Status: DISCONTINUED | OUTPATIENT
Start: 2018-03-28 | End: 2018-04-07

## 2018-03-28 RX ORDER — SIMVASTATIN 20 MG/1
1 TABLET, FILM COATED ORAL
Qty: 0 | Refills: 0 | COMMUNITY

## 2018-03-28 RX ORDER — CHOLECALCIFEROL (VITAMIN D3) 125 MCG
0 CAPSULE ORAL
Qty: 0 | Refills: 0 | COMMUNITY

## 2018-03-28 RX ORDER — SIMVASTATIN 20 MG/1
40 TABLET, FILM COATED ORAL AT BEDTIME
Qty: 0 | Refills: 0 | Status: DISCONTINUED | OUTPATIENT
Start: 2018-03-28 | End: 2018-04-07

## 2018-03-28 RX ORDER — SODIUM CHLORIDE 9 MG/ML
1000 INJECTION, SOLUTION INTRAVENOUS
Qty: 0 | Refills: 0 | Status: DISCONTINUED | OUTPATIENT
Start: 2018-03-28 | End: 2018-04-07

## 2018-03-28 RX ORDER — CHOLECALCIFEROL (VITAMIN D3) 125 MCG
2000 CAPSULE ORAL DAILY
Qty: 0 | Refills: 0 | Status: DISCONTINUED | OUTPATIENT
Start: 2018-03-28 | End: 2018-04-07

## 2018-03-28 RX ORDER — GLUCAGON INJECTION, SOLUTION 0.5 MG/.1ML
1 INJECTION, SOLUTION SUBCUTANEOUS ONCE
Qty: 0 | Refills: 0 | Status: DISCONTINUED | OUTPATIENT
Start: 2018-03-28 | End: 2018-04-07

## 2018-03-28 RX ORDER — SODIUM CHLORIDE 9 MG/ML
500 INJECTION INTRAMUSCULAR; INTRAVENOUS; SUBCUTANEOUS ONCE
Qty: 0 | Refills: 0 | Status: COMPLETED | OUTPATIENT
Start: 2018-03-28 | End: 2018-03-28

## 2018-03-28 RX ORDER — INSULIN LISPRO 100/ML
VIAL (ML) SUBCUTANEOUS
Qty: 0 | Refills: 0 | Status: DISCONTINUED | OUTPATIENT
Start: 2018-03-28 | End: 2018-04-07

## 2018-03-28 RX ORDER — PANTOPRAZOLE SODIUM 20 MG/1
40 TABLET, DELAYED RELEASE ORAL
Qty: 0 | Refills: 0 | Status: DISCONTINUED | OUTPATIENT
Start: 2018-03-28 | End: 2018-03-29

## 2018-03-28 RX ORDER — DEXTROSE 50 % IN WATER 50 %
1 SYRINGE (ML) INTRAVENOUS ONCE
Qty: 0 | Refills: 0 | Status: DISCONTINUED | OUTPATIENT
Start: 2018-03-28 | End: 2018-04-07

## 2018-03-28 RX ORDER — ACETAMINOPHEN 500 MG
650 TABLET ORAL EVERY 6 HOURS
Qty: 0 | Refills: 0 | Status: DISCONTINUED | OUTPATIENT
Start: 2018-03-28 | End: 2018-04-07

## 2018-03-28 RX ORDER — INSULIN LISPRO 100/ML
VIAL (ML) SUBCUTANEOUS AT BEDTIME
Qty: 0 | Refills: 0 | Status: DISCONTINUED | OUTPATIENT
Start: 2018-03-28 | End: 2018-04-07

## 2018-03-28 RX ADMIN — Medication 2000 UNIT(S): at 18:11

## 2018-03-28 RX ADMIN — SODIUM CHLORIDE 500 MILLILITER(S): 9 INJECTION INTRAMUSCULAR; INTRAVENOUS; SUBCUTANEOUS at 13:12

## 2018-03-28 RX ADMIN — SIMVASTATIN 40 MILLIGRAM(S): 20 TABLET, FILM COATED ORAL at 21:32

## 2018-03-28 RX ADMIN — Medication 3: at 18:04

## 2018-03-28 RX ADMIN — WARFARIN SODIUM 4 MILLIGRAM(S): 2.5 TABLET ORAL at 21:17

## 2018-03-28 RX ADMIN — SODIUM CHLORIDE 50 MILLILITER(S): 9 INJECTION INTRAMUSCULAR; INTRAVENOUS; SUBCUTANEOUS at 18:20

## 2018-03-28 NOTE — H&P ADULT - HISTORY OF PRESENT ILLNESS
84 yo male with extensive PMH of CAD s/p CABG, chronic a. fib (on coumadin), h/o Mitral valve clipping, HTN, HLD, CKD stage III, Anemia of chronic disease, h/o colonic AVM, DM2, SILVERIO, dementia, hiatal hernia presents to ED s/p multiple falls and syncope. Pt poor historian secondary to dementia and history obtained from family at bedside. Pt presented to ED on 3/27 after episode of syncope as per family and hit the back of his head on the ground. He was found to have a head laceration which was repaired with staples. CT head at the time was negative and pt was discharged home. While patient was walking into his house he had another episode of syncope which lasted a few seconds as per son. He did not hit his head this time. He then had 2 other episodes of near syncope which he felt weak but did not lose consciousness. He went to sleep downstairs on the couch and when the family woke up this morning he was found on the ground. Currently pt resting comfortably and is pleasantly confused. A+Ox2.  Denies any fevers, chills, headaches, dizziness, palpitations, chest pain, SOB, abd pain, N/V, diarrhea.

## 2018-03-28 NOTE — ED PROVIDER NOTE - MEDICAL DECISION MAKING DETAILS
86 yo male s/p multiple falls in last 24 hours; ?mechanical; 2nd visit to ED for the same; EKG/CXR; labs; CT scans on coumadin; UA; re-eval closely

## 2018-03-28 NOTE — ED ADULT NURSE NOTE - OBJECTIVE STATEMENT
Pt fell yesterday seen in ER and had staples placed in back of head. Pt d/c home and according to son has fell 3x.

## 2018-03-28 NOTE — ED PROVIDER NOTE - OBJECTIVE STATEMENT
84 y/o M with a PMHx of DM, CABG presents to the ED for multiple falls, ?syncope. Patient was seen in the ED yesterday syncope in the kitchen s/p staples in back of head, walked to bathroom steady.  Since then  patient has multiple falls, episode where he slumped to the floor, woke up, then put him to sleep downstairs in the chair then was found on the floor at 6:30am by spouse. Per son pt has been declining progressively last 6 months, pt has fallen twice, now unable to walk without assistance. Pt notes intermittent cognitive episodes. Patient has been off coumadin since yesterday until Friday s/p staples in the head. Patient complains of +R shoulder pain although not necessarily new. Patient does not drink water although gets a green juice everyday.  Non-smoker or ETOH. 84 y/o M with a PMHx of DM, CABG presents to the ED for multiple falls, ?syncope. Patient was seen in the ED yesterday syncope in the kitchen s/p staples in back of head, walked to bathroom steady then d/c.  Since then patient has multiple falls, episode where he slumped to the floor, woke up, then put him to sleep downstairs in the chair then was found on the floor at 6:30am by spouse. Per son pt has been declining progressively last 6 months, pt has fallen twice, now unable to walk without assistance. Pt notes intermittent cognitive episodes. Patient has been off coumadin since yesterday until Friday s/p staples in the head. Patient complains of +R shoulder pain although not necessarily new. Patient does not drink water although gets a green juice everyday.  Non-smoker or ETOH.

## 2018-03-28 NOTE — ED ADULT NURSE REASSESSMENT NOTE - NS ED NURSE REASSESS COMMENT FT1
Pt remains confused which is pt's baseline. Pt straight cath to obtain urine sample. Will continue to monitor.

## 2018-03-28 NOTE — ED ADULT NURSE REASSESSMENT NOTE - NS ED NURSE REASSESS COMMENT FT1
Pt's bgm 228, Dr Sultana made aware and no medication given. Pt ate 1 hour prior to bgm. Bgm done as per wife's request.

## 2018-03-28 NOTE — H&P ADULT - ASSESSMENT
86 yo male with extensive PMH of CAD s/p CABG, chronic a. fib (on coumadin), h/o Mitral valve clipping, HTN, HLD, CKD stage III, Anemia of chronic disease, h/o colonic AVM, DM2, SILVERIO, dementia, hiatal hernia presents to ED s/p multiple falls and syncope. Pt poor historian secondary to dementia and history obtained from family at bedside. Pt presented to ED on 3/27 after episode of syncope as per family and hit the back of his head on the ground. He was found to have a head laceration which was repaired with staples. CT head at the time was negative and pt was discharged home. While patient was walking into his house he had another episode of syncope which lasted a few seconds as per son. He did not hit his head this time. He then had 2 other episodes of near syncope which he felt weak but did not lose consciousness. He went to sleep downstairs on the couch and when the family woke up this morning he was found on the ground. Currently pt resting comfortably and is pleasantly confused. A+Ox2.  Denies any fevers, chills, headaches, dizziness, palpitations, chest pain, SOB, abd pain, N/V, diarrhea.     #Multiple falls with syncope and near syncope  - admit to tele  - CT head negative  - serial cardiac enzymes  - check echo  - check orthostatics  - cardio consult  - PT eval    #EM on CKD stage III with uremia  - baseline Cr 1.7-1.8  - hold torsemide  - gentle IV fluids NS @50ml/hr for 12 hrs  - monitor renal panel  - nephro consult  - renal US    #Anemia of chronic disease  - baseline hgb 12  - check iron studies, B12, folate  - stool occult blood  - monitor CBC    #Chronic A. fib  - rate controlled  - continue coumadin    #DM2  - check HbA1c  - hold januvia  - ISS, finger sticks    #HLD  - continue statin    #DVT prophylaxis  - on coumadin 86 yo male with extensive PMH of CAD s/p CABG, chronic a. fib (on coumadin), h/o Mitral valve clipping, HTN, HLD, CKD stage III, Anemia of chronic disease, h/o colonic AVM, DM2, SILVERIO, dementia, hiatal hernia presents to ED s/p multiple falls and syncope. Pt poor historian secondary to dementia and history obtained from family at bedside. Pt presented to ED on 3/27 after episode of syncope as per family and hit the back of his head on the ground. He was found to have a head laceration which was repaired with staples. CT head at the time was negative and pt was discharged home. While patient was walking into his house he had another episode of syncope which lasted a few seconds as per son. He did not hit his head this time. He then had 2 other episodes of near syncope which he felt weak but did not lose consciousness. He went to sleep downstairs on the couch and when the family woke up this morning he was found on the ground. Currently pt resting comfortably and is pleasantly confused. A+Ox2.  Denies any fevers, chills, headaches, dizziness, palpitations, chest pain, SOB, abd pain, N/V, diarrhea.     #Multiple falls with syncope and near syncope  - admit to tele  - CT head negative  - serial cardiac enzymes  - check echo  - check orthostatics - positive (BP lying 118/65, sitting 98/59, standing 89/55) - will hydrate and repeat in AM, consider florinef is still orthostatic  - cardio consult  - PT eval    #EM on CKD stage III with uremia  - baseline Cr 1.7-1.8  - hold torsemide  - gentle IV fluids NS @50ml/hr for 12 hrs  - monitor renal panel  - nephro consult  - renal US    #Anemia of chronic disease  - baseline hgb 12  - check iron studies, B12, folate  - stool occult blood  - monitor CBC    #Chronic A. fib  - rate controlled  - continue coumadin    #DM2  - check HbA1c  - hold januvia  - ISS, finger sticks    #HLD  - continue statin    #DVT prophylaxis  - on coumadin

## 2018-03-28 NOTE — H&P ADULT - NSHPPHYSICALEXAM_GEN_ALL_CORE
Vital Signs Last 24 Hrs  T(C): 36.8 (28 Mar 2018 16:01), Max: 36.8 (28 Mar 2018 11:21)  T(F): 98.2 (28 Mar 2018 16:01), Max: 98.2 (28 Mar 2018 11:21)  HR: 75 (28 Mar 2018 16:01) (66 - 75)  BP: 134/75 (28 Mar 2018 16:01) (110/66 - 134/75)  RR: 16 (28 Mar 2018 16:01) (14 - 16)  SpO2: 98% (28 Mar 2018 16:01) (98% - 100%)

## 2018-03-28 NOTE — ED ADULT NURSE REASSESSMENT NOTE - COMFORT CARE
meal provided/po fluids offered/plan of care explained
plan of care explained
po fluids offered/meal provided/plan of care explained

## 2018-03-28 NOTE — ED ADULT TRIAGE NOTE - CHIEF COMPLAINT QUOTE
fall yesterday with staples to head, multiple falls since going home yesterday, unwitnessed, patient poor historian. on coumadin, trauma alert initiated.

## 2018-03-29 LAB
ANION GAP SERPL CALC-SCNC: 3 MMOL/L — LOW (ref 5–17)
APTT BLD: 32.4 SEC — SIGNIFICANT CHANGE UP (ref 27.5–37.4)
BASOPHILS # BLD AUTO: 0.02 K/UL — SIGNIFICANT CHANGE UP (ref 0–0.2)
BASOPHILS NFR BLD AUTO: 0.4 % — SIGNIFICANT CHANGE UP (ref 0–2)
BUN SERPL-MCNC: 71 MG/DL — HIGH (ref 7–23)
CALCIUM SERPL-MCNC: 8.7 MG/DL — SIGNIFICANT CHANGE UP (ref 8.5–10.1)
CHLORIDE SERPL-SCNC: 104 MMOL/L — SIGNIFICANT CHANGE UP (ref 96–108)
CHOLEST SERPL-MCNC: 130 MG/DL — SIGNIFICANT CHANGE UP (ref 10–199)
CO2 SERPL-SCNC: 33 MMOL/L — HIGH (ref 22–31)
CREAT ?TM UR-MCNC: 50 MG/DL — SIGNIFICANT CHANGE UP
CREAT SERPL-MCNC: 1.35 MG/DL — HIGH (ref 0.5–1.3)
CULTURE RESULTS: NO GROWTH — SIGNIFICANT CHANGE UP
EOSINOPHIL # BLD AUTO: 0.05 K/UL — SIGNIFICANT CHANGE UP (ref 0–0.5)
EOSINOPHIL NFR BLD AUTO: 0.9 % — SIGNIFICANT CHANGE UP (ref 0–6)
FERRITIN SERPL-MCNC: 205 NG/ML — SIGNIFICANT CHANGE UP (ref 30–400)
FOLATE SERPL-MCNC: >20 NG/ML — SIGNIFICANT CHANGE UP (ref 4.8–24.2)
GLUCOSE BLDC GLUCOMTR-MCNC: 170 MG/DL — HIGH (ref 70–99)
GLUCOSE BLDC GLUCOMTR-MCNC: 171 MG/DL — HIGH (ref 70–99)
GLUCOSE BLDC GLUCOMTR-MCNC: 275 MG/DL — HIGH (ref 70–99)
GLUCOSE BLDC GLUCOMTR-MCNC: 281 MG/DL — HIGH (ref 70–99)
GLUCOSE SERPL-MCNC: 174 MG/DL — HIGH (ref 70–99)
HBA1C BLD-MCNC: 7.3 % — HIGH (ref 4–5.6)
HCT VFR BLD CALC: 24.2 % — LOW (ref 39–50)
HCT VFR BLD CALC: 26.1 % — LOW (ref 39–50)
HDLC SERPL-MCNC: 59 MG/DL — SIGNIFICANT CHANGE UP (ref 40–125)
HGB BLD-MCNC: 8.5 G/DL — LOW (ref 13–17)
HGB BLD-MCNC: 9 G/DL — LOW (ref 13–17)
IMM GRANULOCYTES NFR BLD AUTO: 0.5 % — SIGNIFICANT CHANGE UP (ref 0–1.5)
INR BLD: 2.1 RATIO — HIGH (ref 0.88–1.16)
IRON SATN MFR SERPL: 28 % — SIGNIFICANT CHANGE UP (ref 16–55)
IRON SATN MFR SERPL: 57 UG/DL — SIGNIFICANT CHANGE UP (ref 45–165)
LIPID PNL WITH DIRECT LDL SERPL: 55 MG/DL — SIGNIFICANT CHANGE UP
LYMPHOCYTES # BLD AUTO: 0.42 K/UL — LOW (ref 1–3.3)
LYMPHOCYTES # BLD AUTO: 7.4 % — LOW (ref 13–44)
MAGNESIUM SERPL-MCNC: 2.8 MG/DL — HIGH (ref 1.6–2.6)
MCHC RBC-ENTMCNC: 33.6 PG — SIGNIFICANT CHANGE UP (ref 27–34)
MCHC RBC-ENTMCNC: 35.1 GM/DL — SIGNIFICANT CHANGE UP (ref 32–36)
MCV RBC AUTO: 95.7 FL — SIGNIFICANT CHANGE UP (ref 80–100)
MONOCYTES # BLD AUTO: 0.41 K/UL — SIGNIFICANT CHANGE UP (ref 0–0.9)
MONOCYTES NFR BLD AUTO: 7.2 % — SIGNIFICANT CHANGE UP (ref 2–14)
NEUTROPHILS # BLD AUTO: 4.74 K/UL — SIGNIFICANT CHANGE UP (ref 1.8–7.4)
NEUTROPHILS NFR BLD AUTO: 83.6 % — HIGH (ref 43–77)
NRBC # BLD: 0 /100 WBCS — SIGNIFICANT CHANGE UP (ref 0–0)
PHOSPHATE SERPL-MCNC: 2.5 MG/DL — SIGNIFICANT CHANGE UP (ref 2.5–4.5)
PLATELET # BLD AUTO: 129 K/UL — LOW (ref 150–400)
POTASSIUM SERPL-MCNC: 3.7 MMOL/L — SIGNIFICANT CHANGE UP (ref 3.5–5.3)
POTASSIUM SERPL-SCNC: 3.7 MMOL/L — SIGNIFICANT CHANGE UP (ref 3.5–5.3)
PROT ?TM UR-MCNC: 15 MG/DL — HIGH (ref 0–12)
PROT/CREAT UR-RTO: 0.3 RATIO — HIGH (ref 0–0.2)
PROTHROM AB SERPL-ACNC: 23 SEC — HIGH (ref 9.8–12.7)
RBC # BLD: 2.53 M/UL — LOW (ref 4.2–5.8)
RBC # FLD: 14.4 % — SIGNIFICANT CHANGE UP (ref 10.3–14.5)
SODIUM SERPL-SCNC: 140 MMOL/L — SIGNIFICANT CHANGE UP (ref 135–145)
SPECIMEN SOURCE: SIGNIFICANT CHANGE UP
TIBC SERPL-MCNC: 206 UG/DL — LOW (ref 220–430)
TOTAL CHOLESTEROL/HDL RATIO MEASUREMENT: 2.2 RATIO — LOW (ref 3.4–9.6)
TRANSFERRIN SERPL-MCNC: 160 MG/DL — LOW (ref 200–360)
TRIGL SERPL-MCNC: 80 MG/DL — SIGNIFICANT CHANGE UP (ref 10–149)
UIBC SERPL-MCNC: 149 UG/DL — SIGNIFICANT CHANGE UP (ref 110–370)
VIT B12 SERPL-MCNC: 1600 PG/ML — HIGH (ref 232–1245)
WBC # BLD: 5.67 K/UL — SIGNIFICANT CHANGE UP (ref 3.8–10.5)
WBC # FLD AUTO: 5.67 K/UL — SIGNIFICANT CHANGE UP (ref 3.8–10.5)

## 2018-03-29 PROCEDURE — 93306 TTE W/DOPPLER COMPLETE: CPT | Mod: 26

## 2018-03-29 PROCEDURE — 76770 US EXAM ABDO BACK WALL COMP: CPT | Mod: 26

## 2018-03-29 PROCEDURE — 93010 ELECTROCARDIOGRAM REPORT: CPT

## 2018-03-29 RX ORDER — PANTOPRAZOLE SODIUM 20 MG/1
40 TABLET, DELAYED RELEASE ORAL DAILY
Qty: 0 | Refills: 0 | Status: DISCONTINUED | OUTPATIENT
Start: 2018-03-29 | End: 2018-04-03

## 2018-03-29 RX ORDER — SODIUM CHLORIDE 9 MG/ML
1000 INJECTION INTRAMUSCULAR; INTRAVENOUS; SUBCUTANEOUS
Qty: 0 | Refills: 0 | Status: DISCONTINUED | OUTPATIENT
Start: 2018-03-29 | End: 2018-04-03

## 2018-03-29 RX ADMIN — Medication 3: at 17:50

## 2018-03-29 RX ADMIN — SIMVASTATIN 40 MILLIGRAM(S): 20 TABLET, FILM COATED ORAL at 22:33

## 2018-03-29 RX ADMIN — WARFARIN SODIUM 4 MILLIGRAM(S): 2.5 TABLET ORAL at 22:34

## 2018-03-29 RX ADMIN — SODIUM CHLORIDE 50 MILLILITER(S): 9 INJECTION INTRAMUSCULAR; INTRAVENOUS; SUBCUTANEOUS at 16:53

## 2018-03-29 RX ADMIN — PANTOPRAZOLE SODIUM 40 MILLIGRAM(S): 20 TABLET, DELAYED RELEASE ORAL at 06:01

## 2018-03-29 RX ADMIN — Medication 1: at 12:56

## 2018-03-29 RX ADMIN — Medication 1: at 09:23

## 2018-03-29 RX ADMIN — Medication 1: at 22:42

## 2018-03-29 RX ADMIN — PANTOPRAZOLE SODIUM 40 MILLIGRAM(S): 20 TABLET, DELAYED RELEASE ORAL at 22:33

## 2018-03-29 RX ADMIN — Medication 2000 UNIT(S): at 12:54

## 2018-03-29 NOTE — CONSULT NOTE ADULT - ASSESSMENT
84 yo male with extensive PMH of CAD s/p CABG, chronic a. fib (on coumadin), h/o Mitral valve clipping, HTN, HLD, CKD stage III, Anemia of chronic disease, h/o colonic AVM, DM2, SILVERIO, dementia, hiatal hernia presents to ED s/p multiple falls and syncope. Pt poor historian secondary to dementia and history obtained from family at bedside. Pt presented to ED on 3/27 after episode of syncope as per family and hit the back of his head on the ground. He was found to have a head laceration which was repaired with staples. CT head at the time was negative and pt was discharged home. While patient was walking into his house he had another episode of syncope  1) Check echo today   2) check orthostativc today , would start florinef 0.1 mg if postive   3) anemic toady Dilutional ? would guiac stool and cont to follow . If continues to drop would transfuse given the orthsatic changes   4) observe on tele  5) cont statin  6) cont coumadin for now given risk of CVA   Thank you

## 2018-03-29 NOTE — PHYSICAL THERAPY INITIAL EVALUATION ADULT - PERTINENT HX OF CURRENT PROBLEM, REHAB EVAL
84 yo M admitted s/p multiple falls and syncope. Pt presented to ED on 3/27 after episode of syncope as per family and hit the back of his head on the ground. He was found to have a head laceration which was repaired with staples. CT head at the time was negative and pt was discharged home. While patient was walking into his house he had another episode of syncope which lasted a few seconds as per son. He did not hit his head this time. He then had 2 other episodes of near syncope. CT Head (-).

## 2018-03-29 NOTE — PHYSICAL THERAPY INITIAL EVALUATION ADULT - ACTIVE RANGE OF MOTION EXAMINATION, REHAB EVAL
no Active ROM deficits were identified no Active ROM deficits were identified/except R shoulder AAROM

## 2018-03-29 NOTE — PROGRESS NOTE ADULT - SUBJECTIVE AND OBJECTIVE BOX
CC: syncope, fall    HPI from ED: 84 y/o male with extensive PMHx of CAD s/p CABG, chronic a. fib (on coumadin), h/o Mitral valve clipping, HTN, HLD, CKD stage III, Anemia of chronic disease, h/o colonic AVM, DM2, SILVERIO, dementia, hiatal hernia presents to ED s/p multiple falls and syncope. Pt poor historian secondary to dementia and history obtained from family at bedside. Pt presented to ED on 3/27 after episode of syncope as per family and hit the back of his head on the ground. He was found to have a head laceration which was repaired with staples. CT head at the time was negative and pt was discharged home. While patient was walking into his house he had another episode of syncope which lasted a few seconds as per son. He did not hit his head this time. He then had 2 other episodes of near syncope which he felt weak but did not lose consciousness. He went to sleep downstairs on the couch and when the family woke up this morning he was found on the ground. Currently pt resting comfortably and is pleasantly confused. A+Ox2.  Denies any fevers, chills, headaches, dizziness, palpitations, chest pain, SOB, abd pain, N/V, diarrhea.          Vital Signs Last 24 Hrs T(C): 36.5 (29 Mar 2018 05:57), Max: 36.8 (28 Mar 2018 11:21) T(F): 97.7 (29 Mar 2018 05:57), Max: 98.2 (28 Mar 2018 11:21) HR: 62 (29 Mar 2018 05:57) (60 - 75) BP: 104/54 (29 Mar 2018 05:57) (104/54 - 137/60) BP(mean): -- RR: 17 (29 Mar 2018 05:57) (14 - 18) SpO2: 100% (29 Mar 2018 05:57) (98% - 100%)   PE:       	          Labs & Radiology:                          8.5    5.67  )-----------( 129      ( 29 Mar 2018 06:12 )             24.2     03-29    140  |  104  |  71<H>  ----------------------------<  174<H>  3.7   |  33<H>  |  1.35<H>    Ca    8.7      29 Mar 2018 06:12  Phos  2.5     03-29  Mg     2.8     03-29    TPro  6.3  /  Alb  3.3  /  TBili  0.8  /  DBili  x   /  AST  19  /  ALT  23  /  AlkPhos  64  03-28    PT/INR - ( 28 Mar 2018 11:56 )   PT: 20.2 sec;   INR: 1.85 ratio         PTT - ( 28 Mar 2018 11:56 )  PTT:30.9 sec        CT Cervical Spine:  IMPRESSION:   No vertebral fracture is recognized.  Grade 1 anterior   spondylolisthesis at C6-7 on a degenerative basis.    Narrowing of the   LEFT C2-3, BILATERAL C3-4, LEFT C4-5, LEFT C5-6 and C6-7 neural foramina   due to uncovertebral spurring and facet osteophytic hypertrophy.     CT Abd/pelvis/chest:      IMPRESSION: No traumatic injury.  Markedly distended urinary bladder.   Renal ultrasound is recommended for further evaluation of the   indeterminate hyperdense left kidney lesion.      CT Head:  IMPRESSION:   Moderate periventricular and deep white matter ischemia is   unchanged.    LEFT parietal scalp hematoma is noted with skin staples.    SHOULDER XRAY:  IMPRESSION:   No prior examinations are available for review.    No fracture or dislocation is identified.  Acromioclavicular joint is   intact. Soft tissues are intact. Median sternotomy.                      MEDICATIONS  (STANDING):  cholecalciferol 2000 Unit(s) Oral daily  dextrose 5%. 1000 milliLiter(s) (50 mL/Hr) IV Continuous <Continuous>  dextrose 50% Injectable 12.5 Gram(s) IV Push once  dextrose 50% Injectable 25 Gram(s) IV Push once  dextrose 50% Injectable 25 Gram(s) IV Push once  insulin lispro (HumaLOG) corrective regimen sliding scale   SubCutaneous three times a day before meals  insulin lispro (HumaLOG) corrective regimen sliding scale   SubCutaneous at bedtime  pantoprazole    Tablet 40 milliGRAM(s) Oral before breakfast  simvastatin 40 milliGRAM(s) Oral at bedtime  sodium chloride 0.9%. 1000 milliLiter(s) (50 mL/Hr) IV Continuous <Continuous>  warfarin 4 milliGRAM(s) Oral daily    MEDICATIONS  (PRN):  acetaminophen   Tablet. 650 milliGRAM(s) Oral every 6 hours PRN Mild Pain (1 - 3)  dextrose Gel 1 Dose(s) Oral once PRN Blood Glucose LESS THAN 70 milliGRAM(s)/deciliter  glucagon  Injectable 1 milliGRAM(s) IntraMuscular once PRN Glucose LESS THAN 70 milligrams/deciliter              HOSPITAL COURSE BY PROBLEM:  ASSESSMENT & PLAN:    # Multiple falls with syncope and near syncope  - monitor on tele, afib 40s-70s  - CT head negative  - trop neg x 3  - check echo  - check orthostatics - positive in ED (BP lying 118/65, sitting 98/59, standing 89/55)   - c/w hydration, consider florinef is still orthostatic  - cardio consult appreciated  - PT eval    # EM on CKD stage III with uremia  - baseline Cr 1.7-1.8  - hold torsemide  - gentle IV fluids NS @50ml/hr for 12 hrs  - monitor renal panel  - nephro consult  - renal US    # Anemia of chronic disease  - baseline hgb 12  - iron studies reviewed  - check B12, folate  - stool occult blood  - monitor CBC  - EGD with Dr. Su on 3/7:  Large hiatal hernia. erosive gastropathy. Normal duodenal bulb, second portion of the duodenum and third portion of the duodenum. Biopsies reviewed - negative.      # Chronic A. fib  - rate controlled  - continue coumadin    # DM2  - HgbA1c = 7.3%  - hold januvia  - ISS, finger sticks    # HLD  - continue statin    # DVT prophylaxis  - on coumadin CC: syncope/fall (28 Mar 2018 17:53)    HPI:  86 yo male with extensive PMH of CAD s/p CABG, chronic a. fib (on coumadin), h/o Mitral valve clipping, HTN, HLD, CKD stage III, Anemia of chronic disease, h/o colonic AVM, DM2, SILVERIO, dementia, hiatal hernia presents to ED s/p multiple falls and syncope. Pt poor historian secondary to dementia and history obtained from family at bedside. Pt presented to ED on 3/27 after episode of syncope as per family and hit the back of his head on the ground. He was found to have a head laceration which was repaired with staples. CT head at the time was negative and pt was discharged home. While patient was walking into his house he had another episode of syncope which lasted a few seconds as per son. He did not hit his head this time. He then had 2 other episodes of near syncope which he felt weak but did not lose consciousness. He went to sleep downstairs on the couch and when the family woke up this morning he was found on the ground. Currently pt resting comfortably and is pleasantly confused. A+Ox2.  Denies any fevers, chills, headaches, dizziness, palpitations, chest pain, SOB, abd pain, N/V, diarrhea. (28 Mar 2018 17:53)    INTERVAL HPI/ OVERNIGHT EVENTS: Pt was seen and examined, confused, Pts wife at bedside, confirmed history above, denies any recent illness, no fevers or chills, no SOB or cough, +  urinary incontinence, no dysuria. reports that Pt lost about 40LBS in past few months, states that her son now lives  with them and they are on "healthy" diet. Also she is not sure why Pt is on torsemide, but was never told about CHF or " water in the Lungs" .   Results of work up and  further POC discussed.     Vital Signs Last 24 Hrs  T(C): 36.5 (29 Mar 2018 16:51), Max: 36.5 (29 Mar 2018 05:57)  T(F): 97.7 (29 Mar 2018 16:51), Max: 97.7 (29 Mar 2018 05:57)  HR: 64 (29 Mar 2018 16:51) (60 - 74)  BP: 111/53 (29 Mar 2018 16:51) (104/54 - 137/60)  RR: 17 (29 Mar 2018 16:51) (17 - 18)  SpO2: 100% (29 Mar 2018 16:51) (99% - 100%)          REVIEW OF SYSTEMS:  unable to obtain as Pt has dementia     PHYSICAL EXAM:  General: Well developed; malnourished; in no acute distress  Eyes: PERRLA, EOMI; conjunctiva and sclera clear  Head: Normocephalic; + laceration, repaired, no erythema, no drainage    ENMT: No nasal discharge; airway clear  Neck: Supple; non tender; no masses  Respiratory: Good air entry, No wheezes, rales or rhonchi  Cardiovascular: Regular rate and rhythm. S1 and S2 Normal; No murmurs  Gastrointestinal: Soft non-tender non-distended; Normal bowel sounds  Genitourinary: No costovertebral angle tenderness, some suprapubic fullness  Extremities: Normal range of motion, No edema  Vascular: Peripheral pulses palpable 2+ bilaterally  Neurological: Alert and oriented x2, non focal   Skin: Warm and dry. No acute rash  Lymph Nodes: No acute cervical adenopathy  Musculoskeletal: Normal muscle  tone, without deformities  Psychiatric: Cooperative and appropriate        LABS;     CARDIAC MARKERS ( 28 Mar 2018 19:00 )  <0.015 ng/mL / x     / x     / x     / x      CARDIAC MARKERS ( 28 Mar 2018 11:56 )  <0.015 ng/mL / x     / x     / x     / x                                8.5    5.67  )-----------( 129      ( 29 Mar 2018 06:12 )             24.2     29 Mar 2018 06:12    140    |  104    |  71     ----------------------------<  174    3.7     |  33     |  1.35     Ca    8.7        29 Mar 2018 06:12  Phos  2.5       29 Mar 2018 06:12  Mg     2.8       29 Mar 2018 06:12    TPro  6.3    /  Alb  3.3    /  TBili  0.8    /  DBili  x      /  AST  19     /  ALT  23     /  AlkPhos  64     28 Mar 2018 11:56    PT/INR - ( 28 Mar 2018 11:56 )   PT: 20.2 sec;   INR: 1.85 ratio         PTT - ( 28 Mar 2018 11:56 )  PTT:30.9 sec      CAPILLARY BLOOD GLUCOSE  POCT Blood Glucose.: 275 mg/dL (29 Mar 2018 17:47)  POCT Blood Glucose.: 171 mg/dL (29 Mar 2018 12:53)  POCT Blood Glucose.: 170 mg/dL (29 Mar 2018 09:21)    LIVER FUNCTIONS - ( 28 Mar 2018 11:56 )  Alb: 3.3 g/dL / Pro: 6.3 gm/dL / ALK PHOS: 64 U/L / ALT: 23 U/L / AST: 19 U/L / GGT: x           Urinalysis Basic - ( 28 Mar 2018 14:42 )    Color: Yellow / Appearance: Clear / S.015 / pH: x  Gluc: x / Ketone: Negative  / Bili: Negative / Urobili: Negative mg/dL   Blood: x / Protein: Negative mg/dL / Nitrite: Negative   Leuk Esterase: Negative / RBC: x / WBC x   Sq Epi: x / Non Sq Epi: x / Bacteria: x      Hemoglobin A1C, Whole Blood: 7.3 % (18 @ 06:12)    MEDICATIONS  (STANDING):  cholecalciferol 2000 Unit(s) Oral daily  dextrose 5%. 1000 milliLiter(s) (50 mL/Hr) IV Continuous <Continuous>  dextrose 50% Injectable 12.5 Gram(s) IV Push once  dextrose 50% Injectable 25 Gram(s) IV Push once  dextrose 50% Injectable 25 Gram(s) IV Push once  insulin lispro (HumaLOG) corrective regimen sliding scale   SubCutaneous three times a day before meals  insulin lispro (HumaLOG) corrective regimen sliding scale   SubCutaneous at bedtime  pantoprazole    Tablet 40 milliGRAM(s) Oral before breakfast  simvastatin 40 milliGRAM(s) Oral at bedtime  sodium chloride 0.9%. 1000 milliLiter(s) (50 mL/Hr) IV Continuous <Continuous>  sodium chloride 0.9%. 1000 milliLiter(s) (50 mL/Hr) IV Continuous <Continuous>  warfarin 4 milliGRAM(s) Oral daily    MEDICATIONS  (PRN):  acetaminophen   Tablet. 650 milliGRAM(s) Oral every 6 hours PRN Mild Pain (1 - 3)  dextrose Gel 1 Dose(s) Oral once PRN Blood Glucose LESS THAN 70 milliGRAM(s)/deciliter  glucagon  Injectable 1 milliGRAM(s) IntraMuscular once PRN Glucose LESS THAN 70 milligrams/deciliter      RADIOLOGY & ADDITIONAL TESTS:    CT Cervical Spine:  IMPRESSION:   No vertebral fracture is recognized.  Grade 1 anterior   spondylolisthesis at C6-7 on a degenerative basis.    Narrowing of the   LEFT C2-3, BILATERAL C3-4, LEFT C4-5, LEFT C5-6 and C6-7 neural foramina   due to uncovertebral spurring and facet osteophytic hypertrophy.     CT Abd/pelvis/chest:      IMPRESSION: No traumatic injury.  Markedly distended urinary bladder.   Renal ultrasound is recommended for further evaluation of the   indeterminate hyperdense left kidney lesion.      CT Head:  IMPRESSION:   Moderate periventricular and deep white matter ischemia is   unchanged.    LEFT parietal scalp hematoma is noted with skin staples.    SHOULDER XRAY:  IMPRESSION:   No prior examinations are available for review.    No fracture or dislocation is identified.  Acromioclavicular joint is   intact. Soft tissues are intact. Median sternotomy.                            HOSPITAL COURSE BY PROBLEM:  ASSESSMENT & PLAN:    # Multiple falls with syncope and near syncope  - unclear etiology, but Orthostatic VS positive   - monitor on tele, afib 40s-70s  - CT head negative  - trop neg x 3  - echo results noted, valves stable, EF normal   - C/w IVF for now   -  consider florinef is still orthostatic in am   - anemia work up   - cardio consult appreciated  - PT eval    # EM on CKD stage III with uremia  - likely dehydration and obstruction   - baseline Cr 1.7-1.8  - hold torsemide  - gentle IV fluids NS @50ml/hr x 1L  - bladder scan: + retention , will place Osborne cath   - CT abd: L kidney lesion   - Renal Sono with L complex cyst. Distended bladder   - Monitor UO and Renal Fx     # Acute on Chronic Anemia of blood loss  - baseline hgb 12  - IN EMR had EGD 3/7 with gastric  erosions  and bleeding stigmata?   - B12, folate WNL  - stool occult blood  - Iron panel   - Monitor H/H closely, if repeat still going down, hold coumadin  - C/w PPI, change to IV for now      # Chronic A. fib  - rate controlled  - INR daily   - on  coumadin    # DM2  - HgbA1c = 7.3%  - hold januvia  - ISS, finger sticks    # HLD  - continue statin    # DVT prophylaxis  - on coumadin

## 2018-03-29 NOTE — PHYSICAL THERAPY INITIAL EVALUATION ADULT - ADDITIONAL COMMENTS
PLOF unknown, patient poor historian. Per EMR on last admit to [facility] for Mitral Valve clipping in 2016, patient lived with spouse in a [place] style home with basement stairs and 3 steps to enter the home with rails. Ambulated with RW post procedure.
no

## 2018-03-29 NOTE — CONSULT NOTE ADULT - SUBJECTIVE AND OBJECTIVE BOX
Patient is a 85y old  Male who presents with a chief complaint of syncope/fall (28 Mar 2018 17:53)      HPI:  84 yo male with extensive PMH of CAD s/p CABG, chronic a. fib (on coumadin), h/o Mitral valve clipping, HTN, HLD, CKD stage III, Anemia of chronic disease, h/o colonic AVM, DM2, SILVERIO, dementia, hiatal hernia presents to ED s/p multiple falls and syncope. Pt poor historian secondary to dementia and history obtained from family at bedside. Pt presented to ED on 3/27 after episode of syncope as per family and hit the back of his head on the ground. He was found to have a head laceration which was repaired with staples. CT head at the time was negative and pt was discharged home. While patient was walking into his house he had another episode of syncope which lasted a few seconds as per son. He did not hit his head this time. He then had 2 other episodes of near syncope which he felt weak but did not lose consciousness. He went to sleep downstairs on the couch and when the family woke up this morning he was found on the ground. Currently pt resting comfortably and is pleasantly confused. A+Ox2.  Denies any fevers, chills, headaches, dizziness, palpitations, chest pain, SOB, abd pain, N/V, diarrhea. (28 Mar 2018 17:53)  3/29 - confused oesnt know why he's here. 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 1000 milliLiter(s) (50 mL/Hr) IV Continuous <Continuous>  dextrose 50% Injectable 12.5 Gram(s) IV Push once  dextrose 50% Injectable 25 Gram(s) IV Push once  dextrose 50% Injectable 25 Gram(s) IV Push once  insulin lispro (HumaLOG) corrective regimen sliding scale   SubCutaneous three times a day before meals  insulin lispro (HumaLOG) corrective regimen sliding scale   SubCutaneous at bedtime  pantoprazole    Tablet 40 milliGRAM(s) Oral before breakfast  simvastatin 40 milliGRAM(s) Oral at bedtime  sodium chloride 0.9%. 1000 milliLiter(s) (50 mL/Hr) IV Continuous <Continuous>  warfarin 4 milliGRAM(s) Oral daily    MEDICATIONS  (PRN):  acetaminophen   Tablet. 650 milliGRAM(s) Oral every 6 hours PRN Mild Pain (1 - 3)  dextrose Gel 1 Dose(s) Oral once PRN Blood Glucose LESS THAN 70 milliGRAM(s)/deciliter  glucagon  Injectable 1 milliGRAM(s) IntraMuscular once PRN Glucose LESS THAN 70 milligrams/deciliter      FAMILY HISTORY:  Family history of myocardial infarction (Father)      SOCIAL HISTORY:  nonsmoker  CIGARETTES:        Vital Signs Last 24 Hrs  T(C): 36.5 (29 Mar 2018 05:57), Max: 36.8 (28 Mar 2018 11:21)  T(F): 97.7 (29 Mar 2018 05:57), Max: 98.2 (28 Mar 2018 11:21)  HR: 62 (29 Mar 2018 05:57) (60 - 75)  BP: 104/54 (29 Mar 2018 05:57) (104/54 - 137/60)  BP(mean): --  RR: 17 (29 Mar 2018 05:57) (14 - 18)  SpO2: 100% (29 Mar 2018 05:57) (98% - 100%)            INTERPRETATION OF TELEMETRY:    ECG:    I&O's Detail    28 Mar 2018 07:01  -  29 Mar 2018 07:00  --------------------------------------------------------  IN:  Total IN: 0 mL    OUT:    Intermittent Catheterization - Urethral: 750 mL  Total OUT: 750 mL    Total NET: -750 mL          LABS:                        8.5    5.67  )-----------( 129      ( 29 Mar 2018 06:12 )             24.2         140  |  104  |  71<H>  ----------------------------<  174<H>  3.7   |  33<H>  |  1.35<H>    Ca    8.7      29 Mar 2018 06:12  Phos  2.5       Mg     2.8         TPro  6.3  /  Alb  3.3  /  TBili  0.8  /  DBili  x   /  AST  19  /  ALT  23  /  AlkPhos  64      CARDIAC MARKERS ( 28 Mar 2018 19:00 )  <0.015 ng/mL / x     / x     / x     / x      CARDIAC MARKERS ( 28 Mar 2018 11:56 )  <0.015 ng/mL / x     / x     / x     / x      CARDIAC MARKERS ( 27 Mar 2018 18:16 )  <0.015 ng/mL / x     / 77 U/L / x     / x          PT/INR - ( 28 Mar 2018 11:56 )   PT: 20.2 sec;   INR: 1.85 ratio         PTT - ( 28 Mar 2018 11:56 )  PTT:30.9 sec  Urinalysis Basic - ( 28 Mar 2018 14:42 )    Color: Yellow / Appearance: Clear / S.015 / pH: x  Gluc: x / Ketone: Negative  / Bili: Negative / Urobili: Negative mg/dL   Blood: x / Protein: Negative mg/dL / Nitrite: Negative   Leuk Esterase: Negative / RBC: x / WBC x   Sq Epi: x / Non Sq Epi: x / Bacteria: x      I&O's Summary    28 Mar 2018 07:01  -  29 Mar 2018 07:00  --------------------------------------------------------  IN: 0 mL / OUT: 750 mL / NET: -750 mL      BNP  RADIOLOGY & ADDITIONAL STUDIES:

## 2018-03-29 NOTE — PHYSICAL THERAPY INITIAL EVALUATION ADULT - MANUAL MUSCLE TESTING RESULTS, REHAB EVAL
no strength deficits were identified except R shoulder 2/5 due to pain and crepitus/no strength deficits were identified

## 2018-03-29 NOTE — PHYSICAL THERAPY INITIAL EVALUATION ADULT - MODALITIES TREATMENT COMMENTS
No symptoms during session. See Vitals section for orthostatic BPs. Patient returned to bed with call bell in reach, bed alarm active.

## 2018-03-29 NOTE — CONSULT NOTE ADULT - SUBJECTIVE AND OBJECTIVE BOX
Chief complaints.  Admitted post recurrent fall and syncope.    HPI:  86 yo man with Hx of CKD ( Evaluated by a Nephrologist at [place] in  with baseline creat around 1.8), DM, CAD, and A FIB admitted post recurrent fall and syncope.  Pt evaluated in ED post syncope at home on 3/27.  CT head was negative and pt was d/luma home.   Pt was admitted post recurrent syncope on 3/28.  Pt has Dementia and is unable to provide any detailed hx.      PMHX and PSHX.  1.Dm  2.HTN  3.CAD (hx of CABG x 4)  4.A FIB on A/c  5.Hx of Mitral Valve clipping  6.Dementia  7.Hx of GIB with colonic AVM  8.Hx of TIA  9.Hx of CKD ( renal evaluation in --baseline creat around 1.8)    FAMILY HISTORY:  Family history of myocardial infarction (Father)      SOCIAL HISTORY :  No hx of smoking or ETOH.  Allergies    No Known Allergies    REVIEW OF SYSTEMS :  Unable to obtain due to Dementia.        MEDICATIONS  (STANDING):  cholecalciferol 2000 Unit(s) Oral daily  dextrose 5%. 1000 milliLiter(s) (50 mL/Hr) IV Continuous <Continuous>  dextrose 50% Injectable 12.5 Gram(s) IV Push once  dextrose 50% Injectable 25 Gram(s) IV Push once  dextrose 50% Injectable 25 Gram(s) IV Push once  insulin lispro (HumaLOG) corrective regimen sliding scale   SubCutaneous three times a day before meals  insulin lispro (HumaLOG) corrective regimen sliding scale   SubCutaneous at bedtime  pantoprazole    Tablet 40 milliGRAM(s) Oral before breakfast  simvastatin 40 milliGRAM(s) Oral at bedtime  sodium chloride 0.9%. 1000 milliLiter(s) (50 mL/Hr) IV Continuous <Continuous>  warfarin 4 milliGRAM(s) Oral daily    MEDICATIONS  (PRN):  acetaminophen   Tablet. 650 milliGRAM(s) Oral every 6 hours PRN Mild Pain (1 - 3)  dextrose Gel 1 Dose(s) Oral once PRN Blood Glucose LESS THAN 70 milliGRAM(s)/deciliter  glucagon  Injectable 1 milliGRAM(s) IntraMuscular once PRN Glucose LESS THAN 70 milligrams/deciliter         Vital Signs Last 24 Hrs  T(C): 36.5 (29 Mar 2018 05:57), Max: 36.8 (28 Mar 2018 16:01)  T(F): 97.7 (29 Mar 2018 05:57), Max: 98.2 (28 Mar 2018 16:01)  HR: 62 (29 Mar 2018 05:57) (60 - 75)  BP: 104/54 (29 Mar 2018 05:57) (104/54 - 137/60)  BP(mean): --  RR: 17 (29 Mar 2018 05:57) (15 - 18)  SpO2: 100% (29 Mar 2018 05:57) (98% - 100%)  Daily     Daily Weight in k.8 (28 Mar 2018 23:45)  I&O's Summary    28 Mar 2018 07:01  -  29 Mar 2018 07:00  --------------------------------------------------------  IN: 0 mL / OUT: 750 mL / NET: -750 mL        PHYSICAL EXAM:  Alert and comfortable.  Oriented x1  GEN: No acute distress  HEENT: No distress  NECK : Supple  CV: S1S2 Positive murmur.  LUNGS: Bilateral scattered rhonchi  ABD: soft  EXT: no edema    LABS:                        8.5    5.67  )-----------( 129      ( 29 Mar 2018 06:12 )             24.2         140  |  104  |  71<H>  ----------------------------<  174<H>  3.7   |  33<H>  |  1.35<H>    Ca    8.7      29 Mar 2018 06:12  Phos  2.5       Mg     2.8         TPro  6.3  /  Alb  3.3  /  TBili  0.8  /  DBili  x   /  AST  19  /  ALT  23  /  AlkPhos  64  03-28    PT/INR - ( 28 Mar 2018 11:56 )   PT: 20.2 sec;   INR: 1.85 ratio         PTT - ( 28 Mar 2018 11:56 )  PTT:30.9 sec  Urinalysis Basic - ( 28 Mar 2018 14:42 )    Color: Yellow / Appearance: Clear / S.015 / pH: x  Gluc: x / Ketone: Negative  / Bili: Negative / Urobili: Negative mg/dL   Blood: x / Protein: Negative mg/dL / Nitrite: Negative   Leuk Esterase: Negative / RBC: x / WBC x   Sq Epi: x / Non Sq Epi: x / Bacteria: x      Magnesium, Serum: 2.8 mg/dL ( @ 06:12)  Phosphorus Level, Serum: 2.5 mg/dL ( @ 06:12)      < from: CT Abdomen and Pelvis No Cont (18 @ 14:39) >  EXAM:  CT ABDOMEN AND PELVIS                          EXAM:  CT CHEST                            *** ADDENDUM 2018  ***    Renal ultrasound is recommended for further evaluation of the   indeterminate hyperdense left kidney lesion.      *** END OF ADDENDUM 2018  ***      PROCEDURE DATE:  2018          INTERPRETATION:  Clinical information: Multiple falls    COMPARISON: None    PROCEDURE:   CT of the Chest, Abdomen and Pelvis was performed without intravenous   contrast.   Intravenous contrast: None.  Oral contrast: None.  Sagittal and coronal reformats were performed.    FINDINGS:    CHEST:     LOWER NECK: Within normal limits.  AXILLA, MEDIASTINUM AND GWYN: No lymphadenopathy or mediastinal   hemorrhage.  VESSELS: Normal caliber aorta. Atherosclerotic arterial calcifications,   including coronary artery calcification.  HEART: The heart is enlarged.No pericardial effusion.  PLEURA: No pleural effusion or pneumothorax.  LUNGS AND LARGE AIRWAYS: Patent central airways. No pulmonary nodule,   mass or consolidation.  CHEST WALL:  Unremarkable    ABDOMEN AND PELVIS:    LIVER: Within normal limits.  SPLEEN: Within normal limits.  PANCREAS: Within normal limits.  GALLBLADDER: Cholelithiasis.  BILE DUCTS: Normal caliber.  ADRENALS: Within normal limits.  KIDNEYS/URETERS: No stone or hydronephrosis. 1.6 cm hyperdense   indeterminate lesion in the interpolar left kidney.    RETROPERITONEUM: No lymphadenopathy or hemorrhage.    VESSELS:  Within normal limits.      BOWEL: No bowel obstruction, wall thickening or inflammatory change.   Appendix normal. Moderate hiatal hernia.  PERITONEUM: No ascites, hemorrhage or pneumoperitoneum.    REPRODUCTIVE ORGANS: Moderate prostatomegaly.  BLADDER: Markedly distended urinary bladder.    ABDOMINAL WALL: Within normal limits.  BONES: No fractures.    IMPRESSION:     No traumatic injury.  Markedly distended urinary bladder.    Findings discussed with Dr. Medellin on 2018, 3:00 PM.          ***Please see the addendum at the top of this report. It may contain   additional important information or changes.****          OPAL ANGUIANO   This document has been electronically signed. Mar 28 2018  3:00PM  Addend:  OPAL ANGUIANO   This addendum was electronically signed on: Mar [id number]  5:11PM.          < end of copied text >

## 2018-03-29 NOTE — CONSULT NOTE ADULT - ASSESSMENT
86 yo man with DM. HTN, CAD, A FIB and CKD admitted post syncope.  Pt has known hx of CKD --at baseline at this point.   Continue to follow.  Syncope work up.  Anemia work up.  Cardiac work up in progress.  Fluid /electrolytes stable.

## 2018-03-30 LAB
ANION GAP SERPL CALC-SCNC: 7 MMOL/L — SIGNIFICANT CHANGE UP (ref 5–17)
BUN SERPL-MCNC: 48 MG/DL — HIGH (ref 7–23)
CALCIUM SERPL-MCNC: 8.5 MG/DL — SIGNIFICANT CHANGE UP (ref 8.5–10.1)
CHLORIDE SERPL-SCNC: 104 MMOL/L — SIGNIFICANT CHANGE UP (ref 96–108)
CO2 SERPL-SCNC: 26 MMOL/L — SIGNIFICANT CHANGE UP (ref 22–31)
CREAT SERPL-MCNC: 1.35 MG/DL — HIGH (ref 0.5–1.3)
FERRITIN SERPL-MCNC: 175 NG/ML — SIGNIFICANT CHANGE UP (ref 30–400)
GLUCOSE BLDC GLUCOMTR-MCNC: 142 MG/DL — HIGH (ref 70–99)
GLUCOSE BLDC GLUCOMTR-MCNC: 165 MG/DL — HIGH (ref 70–99)
GLUCOSE BLDC GLUCOMTR-MCNC: 228 MG/DL — HIGH (ref 70–99)
GLUCOSE BLDC GLUCOMTR-MCNC: 332 MG/DL — HIGH (ref 70–99)
GLUCOSE SERPL-MCNC: 297 MG/DL — HIGH (ref 70–99)
HAPTOGLOB SERPL-MCNC: 51 MG/DL — SIGNIFICANT CHANGE UP (ref 34–200)
HCT VFR BLD CALC: 24.2 % — LOW (ref 39–50)
HGB BLD-MCNC: 8.3 G/DL — LOW (ref 13–17)
INR BLD: 2.58 RATIO — HIGH (ref 0.88–1.16)
IRON SATN MFR SERPL: 19 % — SIGNIFICANT CHANGE UP (ref 16–55)
IRON SATN MFR SERPL: 43 UG/DL — LOW (ref 45–165)
LDH SERPL L TO P-CCNC: 117 U/L — SIGNIFICANT CHANGE UP (ref 84–241)
MCHC RBC-ENTMCNC: 33.7 PG — SIGNIFICANT CHANGE UP (ref 27–34)
MCHC RBC-ENTMCNC: 34.3 GM/DL — SIGNIFICANT CHANGE UP (ref 32–36)
MCV RBC AUTO: 98.4 FL — SIGNIFICANT CHANGE UP (ref 80–100)
NRBC # BLD: 0 /100 WBCS — SIGNIFICANT CHANGE UP (ref 0–0)
PLATELET # BLD AUTO: 130 K/UL — LOW (ref 150–400)
POTASSIUM SERPL-MCNC: 4.1 MMOL/L — SIGNIFICANT CHANGE UP (ref 3.5–5.3)
POTASSIUM SERPL-SCNC: 4.1 MMOL/L — SIGNIFICANT CHANGE UP (ref 3.5–5.3)
PROTHROM AB SERPL-ACNC: 28.4 SEC — HIGH (ref 9.8–12.7)
RBC # BLD: 2.46 M/UL — LOW (ref 4.2–5.8)
RBC # BLD: 2.46 M/UL — LOW (ref 4.2–5.8)
RBC # FLD: 14.4 % — SIGNIFICANT CHANGE UP (ref 10.3–14.5)
RETICS #: 102.1 K/UL — SIGNIFICANT CHANGE UP (ref 25–125)
RETICS/RBC NFR: 4.2 % — HIGH (ref 0.5–2.5)
SODIUM SERPL-SCNC: 137 MMOL/L — SIGNIFICANT CHANGE UP (ref 135–145)
TIBC SERPL-MCNC: 222 UG/DL — SIGNIFICANT CHANGE UP (ref 220–430)
UIBC SERPL-MCNC: 179 UG/DL — SIGNIFICANT CHANGE UP (ref 110–370)
WBC # BLD: 6.77 K/UL — SIGNIFICANT CHANGE UP (ref 3.8–10.5)
WBC # FLD AUTO: 6.77 K/UL — SIGNIFICANT CHANGE UP (ref 3.8–10.5)

## 2018-03-30 PROCEDURE — 93880 EXTRACRANIAL BILAT STUDY: CPT | Mod: 26

## 2018-03-30 RX ORDER — FLUDROCORTISONE ACETATE 0.1 MG/1
0.1 TABLET ORAL DAILY
Qty: 0 | Refills: 0 | Status: DISCONTINUED | OUTPATIENT
Start: 2018-03-30 | End: 2018-04-07

## 2018-03-30 RX ORDER — SODIUM CHLORIDE 9 MG/ML
250 INJECTION INTRAMUSCULAR; INTRAVENOUS; SUBCUTANEOUS ONCE
Qty: 0 | Refills: 0 | Status: COMPLETED | OUTPATIENT
Start: 2018-03-30 | End: 2018-03-30

## 2018-03-30 RX ORDER — SODIUM CHLORIDE 9 MG/ML
1000 INJECTION INTRAMUSCULAR; INTRAVENOUS; SUBCUTANEOUS
Qty: 0 | Refills: 0 | Status: DISCONTINUED | OUTPATIENT
Start: 2018-03-30 | End: 2018-03-30

## 2018-03-30 RX ORDER — SENNA PLUS 8.6 MG/1
2 TABLET ORAL AT BEDTIME
Qty: 0 | Refills: 0 | Status: DISCONTINUED | OUTPATIENT
Start: 2018-03-30 | End: 2018-04-07

## 2018-03-30 RX ORDER — DOCUSATE SODIUM 100 MG
100 CAPSULE ORAL
Qty: 0 | Refills: 0 | Status: DISCONTINUED | OUTPATIENT
Start: 2018-03-30 | End: 2018-04-07

## 2018-03-30 RX ADMIN — PANTOPRAZOLE SODIUM 40 MILLIGRAM(S): 20 TABLET, DELAYED RELEASE ORAL at 11:48

## 2018-03-30 RX ADMIN — SIMVASTATIN 40 MILLIGRAM(S): 20 TABLET, FILM COATED ORAL at 21:34

## 2018-03-30 RX ADMIN — Medication 2000 UNIT(S): at 11:47

## 2018-03-30 RX ADMIN — Medication 4: at 08:36

## 2018-03-30 RX ADMIN — SODIUM CHLORIDE 250 MILLILITER(S): 9 INJECTION INTRAMUSCULAR; INTRAVENOUS; SUBCUTANEOUS at 11:45

## 2018-03-30 RX ADMIN — FLUDROCORTISONE ACETATE 0.1 MILLIGRAM(S): 0.1 TABLET ORAL at 11:49

## 2018-03-30 RX ADMIN — Medication 1: at 11:46

## 2018-03-30 RX ADMIN — WARFARIN SODIUM 4 MILLIGRAM(S): 2.5 TABLET ORAL at 21:34

## 2018-03-30 NOTE — PROGRESS NOTE ADULT - SUBJECTIVE AND OBJECTIVE BOX
Cardiology Progress Note    HPI: 86 yo male with extensive PMH of CAD s/p CABG, chronic a. fib (on coumadin), h/o Mitral valve clipping, HTN, HLD, CKD stage III, Anemia of chronic disease, h/o colonic AVM, DM2, SILVERIO, dementia, hiatal hernia presents to ED s/p multiple falls and syncope. Pt poor historian secondary to dementia and history obtained from family at bedside. Pt presented to ED on 3/27 after episode of syncope as per family and hit the back of his head on the ground. He was found to have a head laceration which was repaired with staples. CT head at the time was negative and pt was discharged home. While patient was walking into his house he had another episode of syncope which lasted a few seconds as per son. He did not hit his head this time. He then had 2 other episodes of near syncope which he felt weak but did not lose consciousness. He went to sleep downstairs on the couch and when the family woke up this morning he was found on the ground. Currently pt resting comfortably and is pleasantly confused. A+Ox2.  Denies any fevers, chills, headaches, dizziness, palpitations, chest pain, SOB, abd pain, N/V, diarrhea. (28 Mar 2018 17:53)    3/30- No CP/SOB. +Orthostatics. No fevers.     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  Transfusion history  Anemia with chronic illness  SILVERIO (obstructive sleep apnea): on CPAP  TIA (transient ischemic attack)  CKD (chronic kidney disease)  MR (mitral regurgitation)  Memory loss, short term  Hiatal hernia  DM (diabetes mellitus screen)  HTN (hypertension)  HLD (hyperlipidemia)  BPH (benign prostatic hyperplasia)  Chronic atrial fibrillation  History of tonsillectomy  H/O heart surgery: Clip  H/O cystoscopy: TURP  History of hernia surgery  S/P CABG x 4    Allergies  No Known Allergies    Intolerances    SOCIAL HISTORY: Denies tobacco, etoh abuse or illicit drug use    FAMILY HISTORY: Family history of myocardial infarction (Father)    MEDICATIONS  (STANDING):  cholecalciferol 2000 Unit(s) Oral daily  dextrose 5%. 1000 milliLiter(s) (50 mL/Hr) IV Continuous <Continuous>  dextrose 50% Injectable 12.5 Gram(s) IV Push once  dextrose 50% Injectable 25 Gram(s) IV Push once  dextrose 50% Injectable 25 Gram(s) IV Push once  insulin lispro (HumaLOG) corrective regimen sliding scale   SubCutaneous three times a day before meals  insulin lispro (HumaLOG) corrective regimen sliding scale   SubCutaneous at bedtime  pantoprazole  Injectable 40 milliGRAM(s) IV Push daily  simvastatin 40 milliGRAM(s) Oral at bedtime  sodium chloride 0.9%. 1000 milliLiter(s) (50 mL/Hr) IV Continuous <Continuous>  sodium chloride 0.9%. 1000 milliLiter(s) (50 mL/Hr) IV Continuous <Continuous>  warfarin 4 milliGRAM(s) Oral daily    MEDICATIONS  (PRN):  acetaminophen   Tablet. 650 milliGRAM(s) Oral every 6 hours PRN Mild Pain (1 - 3)  dextrose Gel 1 Dose(s) Oral once PRN Blood Glucose LESS THAN 70 milliGRAM(s)/deciliter  glucagon  Injectable 1 milliGRAM(s) IntraMuscular once PRN Glucose LESS THAN 70 milligrams/deciliter      Vital Signs Last 24 Hrs  T(C): 37.1 (30 Mar 2018 05:34), Max: 37.1 (30 Mar 2018 05:34)  T(F): 98.7 (30 Mar 2018 05:34), Max: 98.7 (30 Mar 2018 05:34)  HR: 63 (30 Mar 2018 05:34) (63 - 73)  BP: 105/49 (30 Mar 2018 05:34) (105/49 - 129/63)  BP(mean): --  RR: 16 (30 Mar 2018 05:34) (16 - 17)  SpO2: 99% (30 Mar 2018 05:34) (99% - 100%)    REVIEW OF SYSTEMS:    CONSTITUTIONAL:  As per HPI.  HEENT:  Eyes:  No diplopia or blurred vision. ENT:  No earache, sore throat or runny nose.  CARDIOVASCULAR:  No pressure, squeezing, strangling, tightness, heaviness or aching about the chest, neck, axilla or epigastrium.  RESPIRATORY:  No cough, shortness of breath, PND or orthopnea.  GASTROINTESTINAL:  No nausea, vomiting or diarrhea.  GENITOURINARY:  No dysuria, frequency or urgency.  MUSCULOSKELETAL:  As per HPI.  NEUROLOGIC:  No paresthesias, fasciculations, seizures or weakness.    PHYSICAL EXAMINATION:    GENERAL APPEARANCE:  Pt. is not currently dyspneic, in no distress. Pt. is alert, oriented, and pleasant.  HEENT:  Pupils are normal and react normally. No icterus. Mucous membranes well colored.  NECK:  Supple. No lymphadenopathy. Jugular venous pressure not elevated. Carotids equal.   HEART:   The cardiac impulse has a normal quality. There are no murmurs, rubs or gallops noted  CHEST:  Chest is clear to auscultation. Normal respiratory effort.  ABDOMEN:  Soft and nontender.   EXTREMITIES:  There is no edema.     I&O's Summary    29 Mar 2018 07:01  -  30 Mar 2018 07:00  --------------------------------------------------------  IN: 0 mL / OUT: 1350 mL / NET: -1350 mL    LABS:                        8.3    6.77  )-----------( 130      ( 30 Mar 2018 06:20 )             24.2     03-30    137  |  104  |  48<H>  ----------------------------<  297<H>  4.1   |  26  |  1.35<H>    Ca    8.5      30 Mar 2018 06:20  Phos  2.5     03-29  Mg     2.8     -29    TPro  6.3  /  Alb  3.3  /  TBili  0.8  /  DBili  x   /  AST  19  /  ALT  23  /  AlkPhos  64  03-28    LIVER FUNCTIONS - ( 28 Mar 2018 11:56 )  Alb: 3.3 g/dL / Pro: 6.3 gm/dL / ALK PHOS: 64 U/L / ALT: 23 U/L / AST: 19 U/L / GGT: x           PT/INR - ( 30 Mar 2018 06:20 )   PT: 28.4 sec;   INR: 2.58 ratio         PTT - ( 29 Mar 2018 21:13 )  PTT:32.4 sec  CARDIAC MARKERS ( 28 Mar 2018 19:00 )  <0.015 ng/mL / x     / x     / x     / x      CARDIAC MARKERS ( 28 Mar 2018 11:56 )  <0.015 ng/mL / x     / x     / x     / x        Urinalysis Basic - ( 28 Mar 2018 14:42 )    Color: Yellow / Appearance: Clear / S.015 / pH: x  Gluc: x / Ketone: Negative  / Bili: Negative / Urobili: Negative mg/dL   Blood: x / Protein: Negative mg/dL / Nitrite: Negative   Leuk Esterase: Negative / RBC: x / WBC x   Sq Epi: x / Non Sq Epi: x / Bacteria: x    EKG: < from: 12 Lead ECG (18 @ 19:49) >  Atrial fibrillation with premature ventricular or aberrantly conducted complexes  Right bundle branch block  Inferior infarct , age undetermined  Abnormal ECG    < end of copied text >    TELEMETRY: AF    CARDIAC TESTS: < from: Transthoracic Echocardiogram (18 @ 09:13) >  Fibrocalcificchanges noted to the Aortic valve leaflets with preserved   leaflet excursion.   Trace aortic regurgitation is present.     The left atrium is moderately dilated.     Mild concentric left ventricular hypertrophy is present.   Estimated left ventricular ejection fraction is 65-70 %.     Fibrocalcific changes noted to the mitral valve leaflets with preserved   leaflet excursion.   Cannot rule out mitral valve clip.   At least moderate (2+) eccentric mitral regurgitation is present.     Normal appearing pulmonic valve structure and function.   The right atrium appears mildly dilated.   A device wire is seen in the RV and RA.   Mild to Moderate Tricuspid regurgitation is present.    < end of copied text >      RADIOLOGY & ADDITIONAL STUDIES:   < from: CT Abdomen and Pelvis No Cont (18 @ 14:39) >  IMPRESSION:     No traumatic injury.  Markedly distended urinary bladder.    < end of copied text >    ASSESSMENT & PLAN:

## 2018-03-30 NOTE — PROGRESS NOTE ADULT - SUBJECTIVE AND OBJECTIVE BOX
Chief complaints.  Admitted post recurrent fall and syncope.    HPI:  84 yo man with Hx of CKD ( Evaluated by a Nephrologist at [place] in 2106 with baseline creat around 1.8), DM, CAD, and A FIB admitted post recurrent fall and syncope.  Pt evaluated in ED post syncope at home on 3/27.  CT head was negative and pt was d/luma home.   Pt was admitted post recurrent syncope on 3/28.  Pt has Dementia and is unable to provide any detailed hx.    3/30  no new events   in bed answers few questions  frail      PMHX and PSHX.  1.Dm  2.HTN  3.CAD (hx of CABG x 4)  4.A FIB on A/c  5.Hx of Mitral Valve clipping  6.Dementia  7.Hx of GIB with colonic AVM  8.Hx of TIA  9.Hx of CKD ( renal evaluation in 2016--baseline creat around 1.8)    FAMILY HISTORY:  Family history of myocardial infarction (Father)      SOCIAL HISTORY :  No hx of smoking or ETOH.  Allergies    No Known Allergies    REVIEW OF SYSTEMS :  Unable to obtain due to Dementia.    MEDICATIONS  (STANDING):  cholecalciferol 2000 Unit(s) Oral daily  dextrose 5%. 1000 milliLiter(s) (50 mL/Hr) IV Continuous <Continuous>  dextrose 50% Injectable 12.5 Gram(s) IV Push once  dextrose 50% Injectable 25 Gram(s) IV Push once  dextrose 50% Injectable 25 Gram(s) IV Push once  fludroCORTISONE 0.1 milliGRAM(s) Oral daily  insulin lispro (HumaLOG) corrective regimen sliding scale   SubCutaneous three times a day before meals  insulin lispro (HumaLOG) corrective regimen sliding scale   SubCutaneous at bedtime  pantoprazole  Injectable 40 milliGRAM(s) IV Push daily  simvastatin 40 milliGRAM(s) Oral at bedtime  sodium chloride 0.9%. 1000 milliLiter(s) (50 mL/Hr) IV Continuous <Continuous>  sodium chloride 0.9%. 1000 milliLiter(s) (50 mL/Hr) IV Continuous <Continuous>  warfarin 4 milliGRAM(s) Oral daily    MEDICATIONS  (PRN):  acetaminophen   Tablet. 650 milliGRAM(s) Oral every 6 hours PRN Mild Pain (1 - 3)  dextrose Gel 1 Dose(s) Oral once PRN Blood Glucose LESS THAN 70 milliGRAM(s)/deciliter  docusate sodium 100 milliGRAM(s) Oral two times a day PRN Constipation  glucagon  Injectable 1 milliGRAM(s) IntraMuscular once PRN Glucose LESS THAN 70 milligrams/deciliter  senna 2 Tablet(s) Oral at bedtime PRN Constipation         Vital Signs Last 24 Hrs  T(C): 36.8 (30 Mar 2018 17:53), Max: 37.1 (30 Mar 2018 05:34)  T(F): 98.3 (30 Mar 2018 17:53), Max: 98.7 (30 Mar 2018 05:34)  HR: 67 (30 Mar 2018 17:53) (63 - 73)  BP: 132/57 (30 Mar 2018 17:53) (105/49 - 132/57)  BP(mean): --  RR: 17 (30 Mar 2018 10:39) (16 - 17)  SpO2: 93% (30 Mar 2018 17:53) (93% - 100%)        PHYSICAL EXAM:  Alert and comfortable.  Oriented x1  GEN: No acute distress  HEENT: No distress  NECK : Supple  CV: S1S2 Positive murmur.  LUNGS: Bilateral scattered rhonchi  ABD: soft  EXT: no edema    LABS:                                   8.3    6.77  )-----------( 130      ( 30 Mar 2018 06:20 )             24.2       03-30    137  |  104  |  48<H>  ----------------------------<  297<H>  4.1   |  26  |  1.35<H>    Ca    8.5      30 Mar 2018 06:20  Phos  2.5     03-29  Mg     2.8     03-29

## 2018-03-30 NOTE — PROGRESS NOTE ADULT - SUBJECTIVE AND OBJECTIVE BOX
CC: syncope/fall (28 Mar 2018 17:53)    HPI from ED:  86 yo male with extensive PMH of CAD s/p CABG, chronic a. fib (on coumadin), h/o Mitral valve clipping, HTN, HLD, CKD stage III, Anemia of chronic disease, h/o colonic AVM, DM2, SILVERIO, dementia, hiatal hernia presents to ED s/p multiple falls and syncope. Pt poor historian secondary to dementia and history obtained from family at bedside. Pt presented to ED on 3/27 after episode of syncope as per family and hit the back of his head on the ground. He was found to have a head laceration which was repaired with staples. CT head at the time was negative and pt was discharged home. While patient was walking into his house he had another episode of syncope which lasted a few seconds as per son. He did not hit his head this time. He then had 2 other episodes of near syncope which he felt weak but did not lose consciousness. He went to sleep downstairs on the couch and when the family woke up this morning he was found on the ground. Currently pt resting comfortably and is pleasantly confused. A+Ox2.  Denies any fevers, chills, headaches, dizziness, palpitations, chest pain, SOB, abd pain, N/V, diarrhea. (28 Mar 2018 17:53)    3/29: Pt was seen and examined, confused, Pts wife at bedside, confirmed history above, denies any recent illness, no fevers or chills, no SOB or cough, +  urinary incontinence, no dysuria. reports that Pt lost about 40LBS in past few months, states that her son now lives  with them and they are on "healthy" diet. Also she is not sure why Pt is on torsemide, but was never told about CHF or " water in the Lungs" .   Results of work up and  further POC discussed.     3/30:    Vital Signs Last 24 Hrs  T(C): 37.1 (30 Mar 2018 05:34), Max: 37.1 (30 Mar 2018 05:34)  T(F): 98.7 (30 Mar 2018 05:34), Max: 98.7 (30 Mar 2018 05:34)  HR: 63 (30 Mar 2018 05:34) (63 - 73)  BP: 105/49 (30 Mar 2018 05:34) (105/49 - 129/63)  BP(mean): --  RR: 16 (30 Mar 2018 05:34) (16 - 17)  SpO2: 99% (30 Mar 2018 05:34) (99% - 100%)      REVIEW OF SYSTEMS:  unable to obtain as Pt has dementia     PHYSICAL EXAM:  General: Well developed; malnourished; in no acute distress  Eyes: PERRLA, EOMI; conjunctiva and sclera clear  Head: Normocephalic; + laceration, repaired, no erythema, no drainage    ENMT: No nasal discharge; airway clear  Neck: Supple; non tender; no masses  Respiratory: Good air entry, No wheezes, rales or rhonchi  Cardiovascular: Regular rate and rhythm. S1 and S2 Normal; No murmurs  Gastrointestinal: Soft non-tender non-distended; Normal bowel sounds  Genitourinary: No costovertebral angle tenderness, some suprapubic fullness  Extremities: Normal range of motion, No edema  Neurological: Alert and oriented x2, non focal   Skin: Warm and dry. No acute rash  Musculoskeletal: Normal muscle  tone, without deformities  Psychiatric: Cooperative and appropriate        Labs & Radiology:                             8.3    6.77  )-----------( 130      ( 30 Mar 2018 06:20 )             24.2     03-30    137  |  104  |  48<H>  ----------------------------<  297<H>  4.1   |  26  |  1.35<H>    Ca    8.5      30 Mar 2018 06:20  Phos  2.5     03-29  Mg     2.8     03-29    TPro  6.3  /  Alb  3.3  /  TBili  0.8  /  DBili  x   /  AST  19  /  ALT  23  /  AlkPhos  64  03-28    PT/INR - ( 30 Mar 2018 06:20 )   PT: 28.4 sec;   INR: 2.58 ratio         PTT - ( 29 Mar 2018 21:13 )  PTT:32.4 sec    Hemoglobin A1C, Whole Blood: 7.3 % (03-29-18 @ 06:12)      CT Cervical Spine:  IMPRESSION:   No vertebral fracture is recognized.  Grade 1 anterior   spondylolisthesis at C6-7 on a degenerative basis.    Narrowing of the   LEFT C2-3, BILATERAL C3-4, LEFT C4-5, LEFT C5-6 and C6-7 neural foramina   due to uncovertebral spurring and facet osteophytic hypertrophy.     CT Abd/pelvis/chest:      IMPRESSION: No traumatic injury.  Markedly distended urinary bladder.   Renal ultrasound is recommended for further evaluation of the   indeterminate hyperdense left kidney lesion.      CT Head:  IMPRESSION:   Moderate periventricular and deep white matter ischemia is   unchanged.    LEFT parietal scalp hematoma is noted with skin staples.    SHOULDER XRAY:  IMPRESSION:   No prior examinations are available for review.    No fracture or dislocation is identified.  Acromioclavicular joint is   intact. Soft tissues are intact. Median sternotomy.                    MEDICATIONS  (STANDING):  cholecalciferol 2000 Unit(s) Oral daily  dextrose 5%. 1000 milliLiter(s) (50 mL/Hr) IV Continuous <Continuous>  dextrose 50% Injectable 12.5 Gram(s) IV Push once  dextrose 50% Injectable 25 Gram(s) IV Push once  dextrose 50% Injectable 25 Gram(s) IV Push once  insulin lispro (HumaLOG) corrective regimen sliding scale   SubCutaneous three times a day before meals  insulin lispro (HumaLOG) corrective regimen sliding scale   SubCutaneous at bedtime  pantoprazole  Injectable 40 milliGRAM(s) IV Push daily  simvastatin 40 milliGRAM(s) Oral at bedtime  sodium chloride 0.9%. 1000 milliLiter(s) (50 mL/Hr) IV Continuous <Continuous>  sodium chloride 0.9%. 1000 milliLiter(s) (50 mL/Hr) IV Continuous <Continuous>  warfarin 4 milliGRAM(s) Oral daily    MEDICATIONS  (PRN):  acetaminophen   Tablet. 650 milliGRAM(s) Oral every 6 hours PRN Mild Pain (1 - 3)  dextrose Gel 1 Dose(s) Oral once PRN Blood Glucose LESS THAN 70 milliGRAM(s)/deciliter  glucagon  Injectable 1 milliGRAM(s) IntraMuscular once PRN Glucose LESS THAN 70 milligrams/deciliter                    HOSPITAL COURSE BY PROBLEM:  ASSESSMENT & PLAN:    # Multiple falls with syncope and near syncope  - unclear etiology, but Orthostatic VS positive   - monitor on tele, afib 40s-70s  - CT head negative  - trop neg x 3  - echo results noted, valves stable, EF normal   - C/w IVF for now    - anemia work up   - cardio consult appreciated ---> added Florinef 0.1mg QD  - PT eval  - repeat orthos today    # EM on CKD stage III with uremia  - likely dehydration and obstruction   - baseline Cr 1.7-1.8  - hold torsemide  - gentle IV fluids NS @50ml/hr x 1L  - bladder scan: + retention , will place Osborne cath   - CT abd: L kidney lesion   - Renal Sono with L complex cyst. Distended bladder   - Monitor UO and Renal Fx     # Acute on Chronic Anemia of blood loss  - baseline hgb 12  - IN EMR had EGD 3/7 with gastric  erosions  and bleeding stigmata?   - B12, folate WNL  - stool occult blood  - Iron panel   - Monitor H/H closely, if repeat still going down, hold coumadin  - C/w PPI, change to IV for now      # Chronic A. fib  - rate controlled  - INR daily   - on  coumadin    # DM2  - HgbA1c = 7.3%  - hold januvia  - ISS, finger sticks    # HLD  - continue statin    # DVT prophylaxis  - on coumadin CC: syncope/fall (28 Mar 2018 17:53)    HPI from ED:  86 yo male with extensive PMH of CAD s/p CABG, chronic a. fib (on coumadin), h/o Mitral valve clipping, HTN, HLD, CKD stage III, Anemia of chronic disease, h/o colonic AVM, DM2, SILVERIO, dementia, hiatal hernia presents to ED s/p multiple falls and syncope. Pt poor historian secondary to dementia and history obtained from family at bedside. Pt presented to ED on 3/27 after episode of syncope as per family and hit the back of his head on the ground. He was found to have a head laceration which was repaired with staples. CT head at the time was negative and pt was discharged home. While patient was walking into his house he had another episode of syncope which lasted a few seconds as per son. He did not hit his head this time. He then had 2 other episodes of near syncope which he felt weak but did not lose consciousness. He went to sleep downstairs on the couch and when the family woke up this morning he was found on the ground. Currently pt resting comfortably and is pleasantly confused. A+Ox2.  Denies any fevers, chills, headaches, dizziness, palpitations, chest pain, SOB, abd pain, N/V, diarrhea. (28 Mar 2018 17:53)    3/29: Pt was seen and examined, confused, Pts wife at bedside, confirmed history above, denies any recent illness, no fevers or chills, no SOB or cough, +  urinary incontinence, no dysuria. reports that Pt lost about 40LBS in past few months, states that her son now lives  with them and they are on "healthy" diet. Also she is not sure why Pt is on torsemide, but was never told about CHF or " water in the Lungs" .   Results of work up and  further POC discussed.         3/30: Pt was seen and examined with NP this am, looks more alert, know where he is, why in the hospital. Denies dizziness. As per RN No Bm. Has good appetite. Osborne placed yesterday, draining dark urine     Vital Signs Last 24 Hrs  T(C): 37.1 (30 Mar 2018 05:34), Max: 37.1 (30 Mar 2018 05:34)  T(F): 98.7 (30 Mar 2018 05:34), Max: 98.7 (30 Mar 2018 05:34)  HR: 63 (30 Mar 2018 05:34) (63 - 73)  BP: 105/49 (30 Mar 2018 05:34) (105/49 - 129/63)  RR: 16 (30 Mar 2018 05:34) (16 - 17)  SpO2: 99% (30 Mar 2018 05:34) (99% - 100%)      REVIEW OF SYSTEMS:  unable to obtain as Pt has dementia     PHYSICAL EXAM:  General: Well developed; malnourished; in no acute distress  Eyes: PERRLA, EOMI; conjunctiva and sclera clear  Head: Normocephalic; + laceration, repaired, no erythema, no drainage    ENMT: No nasal discharge; airway clear  Neck: Supple; non tender; no masses  Respiratory: Good air entry, No wheezes, rales or rhonchi  Cardiovascular: Regular rate and rhythm. S1 and S2 Normal; No murmurs  Gastrointestinal: Soft non-tender non-distended; Normal bowel sounds  Genitourinary: No costovertebral angle tenderness, some suprapubic fullness  Extremities: Normal range of motion, No edema  Neurological: Alert and oriented x2, non focal   Skin: Warm and dry. No acute rash  Musculoskeletal: Normal muscle  tone, without deformities  Psychiatric: Cooperative and appropriate        Labs & Radiology:                             8.3    6.77  )-----------( 130      ( 30 Mar 2018 06:20 )             24.2     03-30    137  |  104  |  48<H>  ----------------------------<  297<H>  4.1   |  26  |  1.35<H>    Ca    8.5      30 Mar 2018 06:20  Phos  2.5     03-29  Mg     2.8     03-29    TPro  6.3  /  Alb  3.3  /  TBili  0.8  /  DBili  x   /  AST  19  /  ALT  23  /  AlkPhos  64  03-28    PT/INR - ( 30 Mar 2018 06:20 )   PT: 28.4 sec;   INR: 2.58 ratio         PTT - ( 29 Mar 2018 21:13 )  PTT:32.4 sec    Hemoglobin A1C, Whole Blood: 7.3 % (03-29-18 @ 06:12)  Vitamin B12, Serum in AM (03.29.18 @ 06:12)    Vitamin B12, Serum: 1600: Note: Reference Range Change on 12/18/2017. pg/mL    Folate, Serum in AM (03.29.18 @ 06:12)    Folate, Serum: >20.0 ng/mL        CT Cervical Spine:  IMPRESSION:   No vertebral fracture is recognized.  Grade 1 anterior   spondylolisthesis at C6-7 on a degenerative basis.    Narrowing of the   LEFT C2-3, BILATERAL C3-4, LEFT C4-5, LEFT C5-6 and C6-7 neural foramina   due to uncovertebral spurring and facet osteophytic hypertrophy.     CT Abd/pelvis/chest:      IMPRESSION: No traumatic injury.  Markedly distended urinary bladder.   Renal ultrasound is recommended for further evaluation of the   indeterminate hyperdense left kidney lesion.      CT Head:  IMPRESSION:   Moderate periventricular and deep white matter ischemia is   unchanged.    LEFT parietal scalp hematoma is noted with skin staples.    SHOULDER XRAY:  IMPRESSION:   No prior examinations are available for review.    No fracture or dislocation is identified.  Acromioclavicular joint is   intact. Soft tissues are intact. Median sternotomy.                    MEDICATIONS  (STANDING):  MEDICATIONS  (STANDING):  cholecalciferol 2000 Unit(s) Oral daily  dextrose 5%. 1000 milliLiter(s) (50 mL/Hr) IV Continuous <Continuous>  dextrose 50% Injectable 12.5 Gram(s) IV Push once  dextrose 50% Injectable 25 Gram(s) IV Push once  dextrose 50% Injectable 25 Gram(s) IV Push once  fludroCORTISONE 0.1 milliGRAM(s) Oral daily  insulin lispro (HumaLOG) corrective regimen sliding scale   SubCutaneous three times a day before meals  insulin lispro (HumaLOG) corrective regimen sliding scale   SubCutaneous at bedtime  pantoprazole  Injectable 40 milliGRAM(s) IV Push daily  simvastatin 40 milliGRAM(s) Oral at bedtime  sodium chloride 0.9%. 1000 milliLiter(s) (50 mL/Hr) IV Continuous <Continuous>  sodium chloride 0.9%. 1000 milliLiter(s) (50 mL/Hr) IV Continuous <Continuous>  warfarin 4 milliGRAM(s) Oral daily    MEDICATIONS  (PRN):  acetaminophen   Tablet. 650 milliGRAM(s) Oral every 6 hours PRN Mild Pain (1 - 3)  dextrose Gel 1 Dose(s) Oral once PRN Blood Glucose LESS THAN 70 milliGRAM(s)/deciliter  glucagon  Injectable 1 milliGRAM(s) IntraMuscular once PRN Glucose LESS THAN 70 milligrams/deciliter              HOSPITAL COURSE BY PROBLEM:  ASSESSMENT & PLAN:    # Multiple falls with syncope and near syncope  - likley  Orthostatic due to dehydration  - Ortostartic VS +, but improved after IVF bolus today, will continue with gentle fluids overnight   - repeat ortospatic VS in am   - monitor on tele, afib 40s-70s  - CT head negative  - trop neg x 3  - echo results noted, valves stable, EF normal   - C/w IVF for now    - anemia work up in progress, FOBP pending   - cardio consult appreciated ---> added Florinef 0.1mg QD, will evaluate in am   - PT eval      # EM on CKD stage III with uremia  - likely dehydration and obstruction   - baseline Cr 1.7-1.8, improving with  hydration   - hold torsemide ( as per son dose was increased in past few weeks)   - C/w gentle IV fluids  - c/w Osborne cath, monitor UO  - CT abd: L kidney lesion   - Renal Sono with L complex cyst. Distended bladder   - Monitor UO and Renal Fx     # Acute on Chronic Anemia of blood loss  - baseline hgb about 12, possibly has dilutional component   - No signs og acute bleeding   - IN EMR had EGD 3/7 with gastric  erosions  and bleeding stigmata?   - B12, folate WNL  - stool occult blood pending   - Iron panel: Iron deficiency and inflammation   - Monitor H/H closely,  stable for  now   - C/w PPI      # Chronic A. fib  - rate controlled  - INR daily   - on  coumadin    # DM2  - HgbA1c = 7.3%  - hold januvia  - ISS, finger sticks    # HLD  - continue statin    # DVT prophylaxis  - on coumadin

## 2018-03-30 NOTE — PROGRESS NOTE ADULT - ASSESSMENT
86 yo male with extensive PMH of CAD s/p CABG, chronic a. fib (on coumadin), h/o Mitral valve clipping, HTN, HLD, CKD stage III, Anemia of chronic disease, h/o colonic AVM, DM2, SILVERIO, dementia, hiatal hernia presents to ED s/p multiple falls and syncope.     1. +Orthostatics- will start Florinef 0.1 mg QD today. Gentle IVF. Repeat orthostatics later in day.     2. 2Decho- Nl LV fxn, mod MR. Report above.     3. Anemia- 8.3 today. If drops below 8, would transfuse 1 unit as pt is symptomatic. W/u as per primary team.     4. Afib- rate controlled. Cont coumadin keep INR 2-3.     5. Dyslipidemia- cont statin.     6. DVT proph, replete lytes daily.

## 2018-03-30 NOTE — PROGRESS NOTE ADULT - ATTENDING COMMENTS
Pt was seen and examined with LUCY Carvalho. Note updated Son called and updated on findings and POC.

## 2018-03-31 LAB
ANION GAP SERPL CALC-SCNC: 6 MMOL/L — SIGNIFICANT CHANGE UP (ref 5–17)
BUN SERPL-MCNC: 44 MG/DL — HIGH (ref 7–23)
CALCIUM SERPL-MCNC: 8.8 MG/DL — SIGNIFICANT CHANGE UP (ref 8.5–10.1)
CHLORIDE SERPL-SCNC: 106 MMOL/L — SIGNIFICANT CHANGE UP (ref 96–108)
CO2 SERPL-SCNC: 28 MMOL/L — SIGNIFICANT CHANGE UP (ref 22–31)
CREAT SERPL-MCNC: 1.29 MG/DL — SIGNIFICANT CHANGE UP (ref 0.5–1.3)
GLUCOSE BLDC GLUCOMTR-MCNC: 139 MG/DL — HIGH (ref 70–99)
GLUCOSE BLDC GLUCOMTR-MCNC: 239 MG/DL — HIGH (ref 70–99)
GLUCOSE BLDC GLUCOMTR-MCNC: 241 MG/DL — HIGH (ref 70–99)
GLUCOSE BLDC GLUCOMTR-MCNC: 270 MG/DL — HIGH (ref 70–99)
GLUCOSE SERPL-MCNC: 263 MG/DL — HIGH (ref 70–99)
HCT VFR BLD CALC: 23.2 % — LOW (ref 39–50)
HCT VFR BLD CALC: 23.5 % — LOW (ref 39–50)
HGB BLD-MCNC: 7.8 G/DL — LOW (ref 13–17)
HGB BLD-MCNC: 7.9 G/DL — LOW (ref 13–17)
INR BLD: 3.49 RATIO — HIGH (ref 0.88–1.16)
MCHC RBC-ENTMCNC: 33.3 PG — SIGNIFICANT CHANGE UP (ref 27–34)
MCHC RBC-ENTMCNC: 33.6 GM/DL — SIGNIFICANT CHANGE UP (ref 32–36)
MCV RBC AUTO: 99.1 FL — SIGNIFICANT CHANGE UP (ref 80–100)
NRBC # BLD: 0 /100 WBCS — SIGNIFICANT CHANGE UP (ref 0–0)
OB PNL STL: NEGATIVE — SIGNIFICANT CHANGE UP
PLATELET # BLD AUTO: 127 K/UL — LOW (ref 150–400)
POTASSIUM SERPL-MCNC: 4.5 MMOL/L — SIGNIFICANT CHANGE UP (ref 3.5–5.3)
POTASSIUM SERPL-SCNC: 4.5 MMOL/L — SIGNIFICANT CHANGE UP (ref 3.5–5.3)
PROT ?TM UR-MCNC: 104 MG/DL — HIGH (ref 0–12)
PROTHROM AB SERPL-ACNC: 38.7 SEC — HIGH (ref 9.8–12.7)
RBC # BLD: 2.34 M/UL — LOW (ref 4.2–5.8)
RBC # FLD: 14.6 % — HIGH (ref 10.3–14.5)
SODIUM SERPL-SCNC: 140 MMOL/L — SIGNIFICANT CHANGE UP (ref 135–145)
WBC # BLD: 8.29 K/UL — SIGNIFICANT CHANGE UP (ref 3.8–10.5)
WBC # FLD AUTO: 8.29 K/UL — SIGNIFICANT CHANGE UP (ref 3.8–10.5)

## 2018-03-31 RX ORDER — POLYETHYLENE GLYCOL 3350 17 G/17G
17 POWDER, FOR SOLUTION ORAL ONCE
Qty: 0 | Refills: 0 | Status: COMPLETED | OUTPATIENT
Start: 2018-03-31 | End: 2018-03-31

## 2018-03-31 RX ADMIN — POLYETHYLENE GLYCOL 3350 17 GRAM(S): 17 POWDER, FOR SOLUTION ORAL at 11:39

## 2018-03-31 RX ADMIN — Medication 10 MILLIGRAM(S): at 00:30

## 2018-03-31 RX ADMIN — Medication 2: at 16:46

## 2018-03-31 RX ADMIN — FLUDROCORTISONE ACETATE 0.1 MILLIGRAM(S): 0.1 TABLET ORAL at 06:46

## 2018-03-31 RX ADMIN — Medication 2000 UNIT(S): at 11:28

## 2018-03-31 RX ADMIN — PANTOPRAZOLE SODIUM 40 MILLIGRAM(S): 20 TABLET, DELAYED RELEASE ORAL at 11:27

## 2018-03-31 RX ADMIN — Medication 3: at 11:26

## 2018-03-31 RX ADMIN — SIMVASTATIN 40 MILLIGRAM(S): 20 TABLET, FILM COATED ORAL at 21:48

## 2018-03-31 RX ADMIN — Medication 2: at 08:08

## 2018-03-31 NOTE — PROVIDER CONTACT NOTE (OTHER) - ASSESSMENT
VSS. Mild dizziness this morning resolved. Occult blood negative from this AM. No S+S of bleeding. Head laceration staples well approximated without S+S of bleeding.

## 2018-03-31 NOTE — PROGRESS NOTE ADULT - SUBJECTIVE AND OBJECTIVE BOX
NEPHROLOGY INTERVAL HPI/OVERNIGHT EVENTS:  3/31 SY  No acute events overnight.  Resting comfortably.    HPI:  86 yo man with Hx of CKD ( Evaluated by a Nephrologist at [facility] in 2106 with baseline creat around 1.8), DM, CAD, and A FIB admitted post recurrent fall and syncope.  Pt evaluated in ED post syncope at home on 3/27.  CT head was negative and pt was d/luma home.   Pt was admitted post recurrent syncope on 3/28.  Pt has Dementia and is unable to provide any detailed hx.    PMHX and PSHX.  1.Dm  2.HTN  3.CAD (hx of CABG x 4)  4.A FIB on A/c  5.Hx of Mitral Valve clipping  6.Dementia  7.Hx of GIB with colonic AVM  8.Hx of TIA  9.Hx of CKD ( renal evaluation in 2016--baseline creat around 1.8)    MEDICATIONS  (STANDING):  cholecalciferol 2000 Unit(s) Oral daily  dextrose 5%. 1000 milliLiter(s) (50 mL/Hr) IV Continuous <Continuous>  dextrose 50% Injectable 12.5 Gram(s) IV Push once  dextrose 50% Injectable 25 Gram(s) IV Push once  dextrose 50% Injectable 25 Gram(s) IV Push once  fludroCORTISONE 0.1 milliGRAM(s) Oral daily  insulin lispro (HumaLOG) corrective regimen sliding scale   SubCutaneous three times a day before meals  insulin lispro (HumaLOG) corrective regimen sliding scale   SubCutaneous at bedtime  pantoprazole  Injectable 40 milliGRAM(s) IV Push daily  simvastatin 40 milliGRAM(s) Oral at bedtime  sodium chloride 0.9%. 1000 milliLiter(s) (50 mL/Hr) IV Continuous <Continuous>  sodium chloride 0.9%. 1000 milliLiter(s) (50 mL/Hr) IV Continuous <Continuous>    MEDICATIONS  (PRN):  acetaminophen   Tablet. 650 milliGRAM(s) Oral every 6 hours PRN Mild Pain (1 - 3)  dextrose Gel 1 Dose(s) Oral once PRN Blood Glucose LESS THAN 70 milliGRAM(s)/deciliter  docusate sodium 100 milliGRAM(s) Oral two times a day PRN Constipation  glucagon  Injectable 1 milliGRAM(s) IntraMuscular once PRN Glucose LESS THAN 70 milligrams/deciliter  senna 2 Tablet(s) Oral at bedtime PRN Constipation          Vital Signs Last 24 Hrs  T(C): 36.8 (31 Mar 2018 10:24), Max: 37 (31 Mar 2018 05:54)  T(F): 98.2 (31 Mar 2018 10:24), Max: 98.6 (31 Mar 2018 05:54)  HR: 58 (31 Mar 2018 10:24) (58 - 75)  BP: 119/56 (31 Mar 2018 10:24) (111/50 - 134/64)  BP(mean): --  RR: 18 (31 Mar 2018 10:24) (18 - 18)  SpO2: 99% (31 Mar 2018 10:24) (93% - 99%)  Daily     Daily     03-30 @ 07:01  -  03-31 @ 07:00  --------------------------------------------------------  IN: 0 mL / OUT: 1530 mL / NET: -1530 mL        PHYSICAL EXAM:  Alert, confused.  GENERAL: No distress  CHEST/LUNG: Clear to aus  HEART: S1S2 RRR  ABDOMEN: soft  EXTREMITIES: no edema  SKIN:     LABS:                        7.8    8.29  )-----------( 127      ( 31 Mar 2018 06:24 )             23.2     03-31    140  |  106  |  44<H>  ----------------------------<  263<H>  4.5   |  28  |  1.29    Ca    8.8      31 Mar 2018 06:24      PT/INR - ( 31 Mar 2018 06:24 )   PT: 38.7 sec;   INR: 3.49 ratio         PTT - ( 29 Mar 2018 21:13 )  PTT:32.4 sec            RADIOLOGY & ADDITIONAL TESTS:

## 2018-04-01 LAB
ANION GAP SERPL CALC-SCNC: 5 MMOL/L — SIGNIFICANT CHANGE UP (ref 5–17)
BLD GP AB SCN SERPL QL: SIGNIFICANT CHANGE UP
BUN SERPL-MCNC: 44 MG/DL — HIGH (ref 7–23)
CALCIUM SERPL-MCNC: 9.1 MG/DL — SIGNIFICANT CHANGE UP (ref 8.5–10.1)
CHLORIDE SERPL-SCNC: 108 MMOL/L — SIGNIFICANT CHANGE UP (ref 96–108)
CO2 SERPL-SCNC: 29 MMOL/L — SIGNIFICANT CHANGE UP (ref 22–31)
CREAT SERPL-MCNC: 1.31 MG/DL — HIGH (ref 0.5–1.3)
GLUCOSE BLDC GLUCOMTR-MCNC: 171 MG/DL — HIGH (ref 70–99)
GLUCOSE BLDC GLUCOMTR-MCNC: 217 MG/DL — HIGH (ref 70–99)
GLUCOSE BLDC GLUCOMTR-MCNC: 241 MG/DL — HIGH (ref 70–99)
GLUCOSE BLDC GLUCOMTR-MCNC: 268 MG/DL — HIGH (ref 70–99)
GLUCOSE SERPL-MCNC: 153 MG/DL — HIGH (ref 70–99)
HCT VFR BLD CALC: 23.3 % — LOW (ref 39–50)
HGB BLD-MCNC: 7.9 G/DL — LOW (ref 13–17)
INR BLD: 2.82 RATIO — HIGH (ref 0.88–1.16)
MCHC RBC-ENTMCNC: 33.3 PG — SIGNIFICANT CHANGE UP (ref 27–34)
MCHC RBC-ENTMCNC: 33.9 GM/DL — SIGNIFICANT CHANGE UP (ref 32–36)
MCV RBC AUTO: 98.3 FL — SIGNIFICANT CHANGE UP (ref 80–100)
NRBC # BLD: 0 /100 WBCS — SIGNIFICANT CHANGE UP (ref 0–0)
PLATELET # BLD AUTO: 128 K/UL — LOW (ref 150–400)
POTASSIUM SERPL-MCNC: 4.9 MMOL/L — SIGNIFICANT CHANGE UP (ref 3.5–5.3)
POTASSIUM SERPL-SCNC: 4.9 MMOL/L — SIGNIFICANT CHANGE UP (ref 3.5–5.3)
PROTHROM AB SERPL-ACNC: 31.1 SEC — HIGH (ref 9.8–12.7)
RBC # BLD: 2.37 M/UL — LOW (ref 4.2–5.8)
RBC # FLD: 14.8 % — HIGH (ref 10.3–14.5)
SODIUM SERPL-SCNC: 142 MMOL/L — SIGNIFICANT CHANGE UP (ref 135–145)
TYPE + AB SCN PNL BLD: SIGNIFICANT CHANGE UP
WBC # BLD: 6.34 K/UL — SIGNIFICANT CHANGE UP (ref 3.8–10.5)
WBC # FLD AUTO: 6.34 K/UL — SIGNIFICANT CHANGE UP (ref 3.8–10.5)

## 2018-04-01 PROCEDURE — 74176 CT ABD & PELVIS W/O CONTRAST: CPT | Mod: 26

## 2018-04-01 PROCEDURE — 73620 X-RAY EXAM OF FOOT: CPT | Mod: 26

## 2018-04-01 RX ORDER — FOLIC ACID 0.8 MG
1 TABLET ORAL DAILY
Qty: 0 | Refills: 0 | Status: DISCONTINUED | OUTPATIENT
Start: 2018-04-01 | End: 2018-04-07

## 2018-04-01 RX ORDER — SODIUM FERRIC GLUCONAT/SUCROSE 62.5MG/5ML
125 AMPUL (ML) INTRAVENOUS DAILY
Qty: 0 | Refills: 0 | Status: DISCONTINUED | OUTPATIENT
Start: 2018-04-01 | End: 2018-04-01

## 2018-04-01 RX ORDER — SODIUM FERRIC GLUCONAT/SUCROSE 62.5MG/5ML
125 AMPUL (ML) INTRAVENOUS DAILY
Qty: 0 | Refills: 0 | Status: COMPLETED | OUTPATIENT
Start: 2018-04-01 | End: 2018-04-04

## 2018-04-01 RX ADMIN — SENNA PLUS 2 TABLET(S): 8.6 TABLET ORAL at 15:30

## 2018-04-01 RX ADMIN — Medication 1: at 22:47

## 2018-04-01 RX ADMIN — Medication 1 MILLIGRAM(S): at 22:48

## 2018-04-01 RX ADMIN — SIMVASTATIN 40 MILLIGRAM(S): 20 TABLET, FILM COATED ORAL at 22:48

## 2018-04-01 RX ADMIN — PANTOPRAZOLE SODIUM 40 MILLIGRAM(S): 20 TABLET, DELAYED RELEASE ORAL at 11:25

## 2018-04-01 RX ADMIN — Medication 100 MILLIGRAM(S): at 15:30

## 2018-04-01 RX ADMIN — Medication 2: at 11:08

## 2018-04-01 RX ADMIN — FLUDROCORTISONE ACETATE 0.1 MILLIGRAM(S): 0.1 TABLET ORAL at 05:09

## 2018-04-01 RX ADMIN — Medication 2: at 16:27

## 2018-04-01 RX ADMIN — Medication 1: at 07:34

## 2018-04-01 RX ADMIN — Medication 2000 UNIT(S): at 11:25

## 2018-04-01 RX ADMIN — Medication 110 MILLIGRAM(S): at 22:48

## 2018-04-01 NOTE — PROGRESS NOTE ADULT - SUBJECTIVE AND OBJECTIVE BOX
CC: syncope/fall (28 Mar 2018 17:53)    HPI from ED:  84 yo male with extensive PMH of CAD s/p CABG, chronic a. fib (on coumadin), h/o Mitral valve clipping, HTN, HLD, CKD stage III, Anemia of chronic disease, h/o colonic AVM, DM2, SILVERIO, dementia, hiatal hernia presents to ED s/p multiple falls and syncope. Pt poor historian secondary to dementia and history obtained from family at bedside. Pt presented to ED on 3/27 after episode of syncope as per family and hit the back of his head on the ground. He was found to have a head laceration which was repaired with staples. CT head at the time was negative and pt was discharged home. While patient was walking into his house he had another episode of syncope which lasted a few seconds as per son. He did not hit his head this time. He then had 2 other episodes of near syncope which he felt weak but did not lose consciousness. He went to sleep downstairs on the couch and when the family woke up this morning he was found on the ground. Currently pt resting comfortably and is pleasantly confused. A+Ox2.  Denies any fevers, chills, headaches, dizziness, palpitations, chest pain, SOB, abd pain, N/V, diarrhea. (28 Mar 2018 17:53)    3/29: Pt was seen and examined, confused, Pts wife at bedside, confirmed history above, denies any recent illness, no fevers or chills, no SOB or cough, +  urinary incontinence, no dysuria. reports that Pt lost about 40LBS in past few months, states that her son now lives  with them and they are on "healthy" diet. Also she is not sure why Pt is on torsemide, but was never told about CHF or " water in the Lungs" .   Results of work up and  further POC discussed.         3/30: Pt was seen and examined with NP this am, looks more alert, know where he is, why in the hospital. Denies dizziness. As per RN No Bm. Has good appetite. Osborne placed yesterday, draining dark urine     3/31: Pt was seen and examined this am, c/o feeling tired and wants to rest. As per RN was checking Orthostatics and Pt was c/o lightheadedness on changing position from laying to sitting and didnt want to stand up. otherwise no issues. Good oral intake. Completed IVF.     4/1: Pt was seen and examined, OOB  to chair, min verbal output.  Denies any SOB or pain. Unable to say what had for breakfast.  As per RN no events.  Noted Family at bedside late, as per daughter, Pt declined in past 6 months significantly physically and cognitively, also Pt noted to understand that cognitively not there and gets upset.  Family is asking for psych eval for possible depression     Vital Signs Last 24 Hrs  T(C): 36.6 (01 Apr 2018 10:53), Max: 37 (31 Mar 2018 16:12)  T(F): 97.8 (01 Apr 2018 10:53), Max: 98.6 (31 Mar 2018 16:12)  HR: 69 (01 Apr 2018 10:53) (62 - 69)  RR: 18 (01 Apr 2018 10:53) (18 - 18)  SpO2: 100% (01 Apr 2018 10:53) (94% - 100%)      REVIEW OF SYSTEMS:  unable to obtain as Pt has dementia     PHYSICAL EXAM:  General: Well developed; malnourished; in no acute distress  Eyes: PERRLA, EOMI; conjunctiva and sclera clear  Head: Normocephalic; + laceration, repaired, no erythema, no drainage    ENMT: No nasal discharge; airway clear  Neck: Supple; non tender; no masses  Respiratory: Good air entry, No wheezes, rales or rhonchi  Cardiovascular: Regular rate and rhythm. S1 and S2 Normal; No murmurs  Gastrointestinal: Soft non-tender non-distended; Normal bowel sounds  Genitourinary: No costovertebral angle tenderness, Osborne in place   Extremities: Normal range of motion, No edema  Neurological: Alert and oriented x2, non focal   Skin: Warm and dry. No acute rash  Musculoskeletal: Normal muscle  tone, without deformities  Psychiatric: Cooperative and appropriate        Labs & Radiology:                         7.9    6.34  )-----------( 128      ( 01 Apr 2018 06:27 )             23.3     04-01    142  |  108  |  44<H>  ----------------------------<  153<H>  4.9   |  29  |  1.31<H>    Ca    9.1      01 Apr 2018 06:27    PT/INR - ( 01 Apr 2018 06:27 )   PT: 31.1 sec;   INR: 2.82 ratio                                  7.9    x     )-----------( x        ( 31 Mar 2018 14:05 )             23.5     03-31    140  |  106  |  44<H>  ----------------------------<  263<H>  4.5   |  28  |  1.29    Ca    8.8      31 Mar 2018 06:24    PT/INR - ( 31 Mar 2018 06:24 )   PT: 38.7 sec;   INR: 3.49 ratio    PTT - ( 29 Mar 2018 21:13 )  PTT:32.4 sec                      8.3    6.77  )-----------( 130      ( 30 Mar 2018 06:20 )             24.2     03-30    137  |  104  |  48<H>  ----------------------------<  297<H>  4.1   |  26  |  1.35<H>    Ca    8.5      30 Mar 2018 06:20  Phos  2.5     03-29  Mg     2.8     03-29    TPro  6.3  /  Alb  3.3  /  TBili  0.8  /  DBili  x   /  AST  19  /  ALT  23  /  AlkPhos  64  03-28    PT/INR - ( 30 Mar 2018 06:20 )   PT: 28.4 sec;   INR: 2.58 ratio         PTT - ( 29 Mar 2018 21:13 )  PTT:32.4 sec    Hemoglobin A1C, Whole Blood: 7.3 % (03-29-18 @ 06:12)  Vitamin B12, Serum in AM (03.29.18 @ 06:12)    Vitamin B12, Serum: 1600: Note: Reference Range Change on 12/18/2017. pg/mL    Folate, Serum in AM (03.29.18 @ 06:12)    Folate, Serum: >20.0 ng/mL        CT Cervical Spine:  IMPRESSION:   No vertebral fracture is recognized.  Grade 1 anterior   spondylolisthesis at C6-7 on a degenerative basis.    Narrowing of the   LEFT C2-3, BILATERAL C3-4, LEFT C4-5, LEFT C5-6 and C6-7 neural foramina   due to uncovertebral spurring and facet osteophytic hypertrophy.     CT Abd/pelvis/chest:      IMPRESSION: No traumatic injury.  Markedly distended urinary bladder.   Renal ultrasound is recommended for further evaluation of the   indeterminate hyperdense left kidney lesion.      CT Head:  IMPRESSION:   Moderate periventricular and deep white matter ischemia is   unchanged.    LEFT parietal scalp hematoma is noted with skin staples.    SHOULDER XRAY:  IMPRESSION:   No prior examinations are available for review.    No fracture or dislocation is identified.  Acromioclavicular joint is   intact. Soft tissues are intact. Median sternotomy.                    MEDICATIONS  (STANDING):  cholecalciferol 2000 Unit(s) Oral daily  dextrose 5%. 1000 milliLiter(s) (50 mL/Hr) IV Continuous <Continuous>  dextrose 50% Injectable 12.5 Gram(s) IV Push once  dextrose 50% Injectable 25 Gram(s) IV Push once  dextrose 50% Injectable 25 Gram(s) IV Push once  fludroCORTISONE 0.1 milliGRAM(s) Oral daily  insulin lispro (HumaLOG) corrective regimen sliding scale   SubCutaneous three times a day before meals  insulin lispro (HumaLOG) corrective regimen sliding scale   SubCutaneous at bedtime  pantoprazole  Injectable 40 milliGRAM(s) IV Push daily  simvastatin 40 milliGRAM(s) Oral at bedtime  sodium chloride 0.9%. 1000 milliLiter(s) (50 mL/Hr) IV Continuous <Continuous>  sodium chloride 0.9%. 1000 milliLiter(s) (50 mL/Hr) IV Continuous <Continuous>    MEDICATIONS  (PRN):  acetaminophen   Tablet. 650 milliGRAM(s) Oral every 6 hours PRN Mild Pain (1 - 3)  dextrose Gel 1 Dose(s) Oral once PRN Blood Glucose LESS THAN 70 milliGRAM(s)/deciliter  docusate sodium 100 milliGRAM(s) Oral two times a day PRN Constipation  glucagon  Injectable 1 milliGRAM(s) IntraMuscular once PRN Glucose LESS THAN 70 milligrams/deciliter  senna 2 Tablet(s) Oral at bedtime PRN Constipation            HOSPITAL COURSE BY PROBLEM:  ASSESSMENT & PLAN:    # Multiple falls with syncope and near syncope  - likely  Orthostatic due to dehydration and hypovolemia   - Orthostatic VS  improved after IVF ,  but Pt feels dizzy on getting up. Started on Florinef   - monitor on tele, afib 40s-70s  - CT head negative  - trop neg x 3  - echo results noted, valves stable, EF normal   - completed IVF, monitor off, encourage oral hydration   - cardio consult appreciated ---> added Florinef 0.1mg QD  - PT       # EM on CKD stage III with Azotemia   - likely dehydration and obstruction   - CR down =  - hold torsemide ( as per son dose was increased in past few weeks)   - watch off  IV fluids  - c/w Osborne cath, monitor UO  - CT abd: L kidney lesion   - Renal Sono with L complex cyst. Distended bladder   - Monitor UO and Renal Fx     # Acute on Chronic Anemia of blood loss?   - h/o AVM  GI bleed, as per family bled significantly from laceration  - baseline hgb about 12, possibly has dilutional component   - No signs of  acute bleeding   - IN EMR had EGD 3/7 with gastric  erosions  and bleeding stigmata?   - CT Chest/abd/pelvis neg for bleeding on admission  - Repeat CT abd/pelvis pending, but Pt is asymptomatic   - B12, folate WNL  - stool occult blood neg   - Iron panel: Iron deficiency and inflammation   - Retic count appropriate   - Monitor H/H closely  - C/w PPI  - Iron supplement   - D/w Dr Lee, considering Pt is still symptomatic  and did have H/H drop, with elevated INR,  will transfuse PRBCs       # Chronic A. fib  - rate controlled  - INR daily, SupraTX   - Hold  coumadin for now       # Dementia with possible Depression  - supportive care   - Psych eval       # DM2  - HgbA1c = 7.3%  - hold januvia  - ISS, finger sticks    # HLD  - continue statin    # DVT prophylaxis  - on coumadin      Pts  wife (HCP)  was called, PRBCs transfusion discussed,  agrees  with plan

## 2018-04-02 LAB
ADD ON TEST-SPECIMEN IN LAB: SIGNIFICANT CHANGE UP
ANION GAP SERPL CALC-SCNC: 4 MMOL/L — LOW (ref 5–17)
BUN SERPL-MCNC: 50 MG/DL — HIGH (ref 7–23)
CALCIUM SERPL-MCNC: 8.8 MG/DL — SIGNIFICANT CHANGE UP (ref 8.5–10.1)
CHLORIDE SERPL-SCNC: 106 MMOL/L — SIGNIFICANT CHANGE UP (ref 96–108)
CO2 SERPL-SCNC: 30 MMOL/L — SIGNIFICANT CHANGE UP (ref 22–31)
CREAT SERPL-MCNC: 1.48 MG/DL — HIGH (ref 0.5–1.3)
GLUCOSE BLDC GLUCOMTR-MCNC: 174 MG/DL — HIGH (ref 70–99)
GLUCOSE BLDC GLUCOMTR-MCNC: 204 MG/DL — HIGH (ref 70–99)
GLUCOSE BLDC GLUCOMTR-MCNC: 235 MG/DL — HIGH (ref 70–99)
GLUCOSE BLDC GLUCOMTR-MCNC: 295 MG/DL — HIGH (ref 70–99)
GLUCOSE BLDC GLUCOMTR-MCNC: 368 MG/DL — HIGH (ref 70–99)
GLUCOSE SERPL-MCNC: 190 MG/DL — HIGH (ref 70–99)
HCT VFR BLD CALC: 27.7 % — LOW (ref 39–50)
HCT VFR BLD CALC: 30.4 % — LOW (ref 39–50)
HGB BLD-MCNC: 10.2 G/DL — LOW (ref 13–17)
HGB BLD-MCNC: 9.4 G/DL — LOW (ref 13–17)
IGA FLD-MCNC: 165 MG/DL — SIGNIFICANT CHANGE UP (ref 68–378)
IGG FLD-MCNC: 572 MG/DL — LOW (ref 694–1618)
IGM SERPL-MCNC: 11 MG/DL — LOW (ref 40–230)
INR BLD: 2.53 RATIO — HIGH (ref 0.88–1.16)
INTERPRETATION 24H UR IFE-IMP: SIGNIFICANT CHANGE UP
KAPPA LC SER QL IFE: 2.61 MG/DL — HIGH (ref 0.33–1.94)
KAPPA/LAMBDA FREE LIGHT CHAIN RATIO, SERUM: 0.89 RATIO — SIGNIFICANT CHANGE UP (ref 0.26–1.65)
LAMBDA LC SER QL IFE: 2.93 MG/DL — HIGH (ref 0.57–2.63)
MCHC RBC-ENTMCNC: 32.9 PG — SIGNIFICANT CHANGE UP (ref 27–34)
MCHC RBC-ENTMCNC: 33 PG — SIGNIFICANT CHANGE UP (ref 27–34)
MCHC RBC-ENTMCNC: 33.6 GM/DL — SIGNIFICANT CHANGE UP (ref 32–36)
MCHC RBC-ENTMCNC: 33.9 GM/DL — SIGNIFICANT CHANGE UP (ref 32–36)
MCV RBC AUTO: 97.2 FL — SIGNIFICANT CHANGE UP (ref 80–100)
MCV RBC AUTO: 98.1 FL — SIGNIFICANT CHANGE UP (ref 80–100)
NRBC # BLD: 0 /100 WBCS — SIGNIFICANT CHANGE UP (ref 0–0)
NRBC # BLD: 0 /100 WBCS — SIGNIFICANT CHANGE UP (ref 0–0)
PLATELET # BLD AUTO: 139 K/UL — LOW (ref 150–400)
PLATELET # BLD AUTO: 155 K/UL — SIGNIFICANT CHANGE UP (ref 150–400)
POTASSIUM SERPL-MCNC: 4.4 MMOL/L — SIGNIFICANT CHANGE UP (ref 3.5–5.3)
POTASSIUM SERPL-SCNC: 4.4 MMOL/L — SIGNIFICANT CHANGE UP (ref 3.5–5.3)
PROTHROM AB SERPL-ACNC: 27.9 SEC — HIGH (ref 9.8–12.7)
RBC # BLD: 2.85 M/UL — LOW (ref 4.2–5.8)
RBC # BLD: 3.1 M/UL — LOW (ref 4.2–5.8)
RBC # FLD: 16.1 % — HIGH (ref 10.3–14.5)
RBC # FLD: 16.2 % — HIGH (ref 10.3–14.5)
SODIUM SERPL-SCNC: 140 MMOL/L — SIGNIFICANT CHANGE UP (ref 135–145)
URATE SERPL-MCNC: 7.2 MG/DL — SIGNIFICANT CHANGE UP (ref 3.4–8.8)
WBC # BLD: 6.41 K/UL — SIGNIFICANT CHANGE UP (ref 3.8–10.5)
WBC # BLD: 8.07 K/UL — SIGNIFICANT CHANGE UP (ref 3.8–10.5)
WBC # FLD AUTO: 6.41 K/UL — SIGNIFICANT CHANGE UP (ref 3.8–10.5)
WBC # FLD AUTO: 8.07 K/UL — SIGNIFICANT CHANGE UP (ref 3.8–10.5)

## 2018-04-02 PROCEDURE — 70450 CT HEAD/BRAIN W/O DYE: CPT | Mod: 26

## 2018-04-02 RX ORDER — ACETAMINOPHEN 500 MG
1000 TABLET ORAL ONCE
Qty: 0 | Refills: 0 | Status: COMPLETED | OUTPATIENT
Start: 2018-04-02 | End: 2018-04-02

## 2018-04-02 RX ORDER — POLYETHYLENE GLYCOL 3350 17 G/17G
17 POWDER, FOR SOLUTION ORAL DAILY
Qty: 0 | Refills: 0 | Status: DISCONTINUED | OUTPATIENT
Start: 2018-04-02 | End: 2018-04-07

## 2018-04-02 RX ORDER — HALOPERIDOL DECANOATE 100 MG/ML
0.5 INJECTION INTRAMUSCULAR ONCE
Qty: 0 | Refills: 0 | Status: COMPLETED | OUTPATIENT
Start: 2018-04-02 | End: 2018-04-02

## 2018-04-02 RX ADMIN — Medication 100 MILLIGRAM(S): at 22:25

## 2018-04-02 RX ADMIN — POLYETHYLENE GLYCOL 3350 17 GRAM(S): 17 POWDER, FOR SOLUTION ORAL at 17:09

## 2018-04-02 RX ADMIN — Medication 1: at 07:58

## 2018-04-02 RX ADMIN — Medication 400 MILLIGRAM(S): at 17:09

## 2018-04-02 RX ADMIN — Medication 2000 UNIT(S): at 12:16

## 2018-04-02 RX ADMIN — HALOPERIDOL DECANOATE 0.5 MILLIGRAM(S): 100 INJECTION INTRAMUSCULAR at 14:09

## 2018-04-02 RX ADMIN — FLUDROCORTISONE ACETATE 0.1 MILLIGRAM(S): 0.1 TABLET ORAL at 05:34

## 2018-04-02 RX ADMIN — Medication 2: at 17:09

## 2018-04-02 RX ADMIN — Medication 1000 MILLIGRAM(S): at 19:20

## 2018-04-02 RX ADMIN — PANTOPRAZOLE SODIUM 40 MILLIGRAM(S): 20 TABLET, DELAYED RELEASE ORAL at 12:16

## 2018-04-02 RX ADMIN — SIMVASTATIN 40 MILLIGRAM(S): 20 TABLET, FILM COATED ORAL at 22:24

## 2018-04-02 RX ADMIN — SENNA PLUS 2 TABLET(S): 8.6 TABLET ORAL at 22:24

## 2018-04-02 RX ADMIN — Medication 1: at 22:24

## 2018-04-02 RX ADMIN — Medication 2: at 12:17

## 2018-04-02 RX ADMIN — Medication 110 MILLIGRAM(S): at 12:28

## 2018-04-02 RX ADMIN — Medication 1 MILLIGRAM(S): at 12:16

## 2018-04-02 NOTE — CONSULT NOTE ADULT - ASSESSMENT
84 yo male with extensive h/o CAD s/p CABG, chronic a. fib (on coumadin), h/o Mitral valve clipping, HTN, HLD, CKD stage III, Anemia of chronic disease, h/o colonic AVM, DM2, SILVERIO, dementia, hiatal hernia is seen and evaluated for:    1. Cellulitis vs Acute trauma at second digit, left foot  2. Pain 2nd digit, left foot  3. DM  4. PVD b/l     Plan:  Pt is seen and evaluated; d/w attending Dr. Jones   X-ray of the left foot reviewed   Applied betadine soaked gauze/carmen tariq splint to 2nd and 3rd digit, left foot  Applied surgical shoe to the left foot  Dispensed Z-flex offloading boot b/l to offload the heels   Recommend ID consult to evaluate the left 2nd toe   Podiatry will continue to follow while in house

## 2018-04-02 NOTE — CHART NOTE - NSCHARTNOTEFT_GEN_A_CORE
full consultation to follow    85-year-old male with multiple medical problems; on Coumadin  Had recent fall at home with laceration to skull  Went home and subsequently readmitted several episodes of syncope  Noted to have acute  drop in hemoglobin over several days  INR mildly elevated; decreased iron  LDH and haptoglobin normal  History of gastrointestinal bleeding related to AVMs, history of esophagitis    Impression: acute drop in hemoglobin related to bleeding, possibly from skull,  Delayed internal bleeding also possible  plan: supplemental iron and folic acid  Consider additional radiographic studies of abdomen/pelvis and/or skull  To rule out delayed hematoma  Above reviewed with hospitalist and briefly with daughter at bedside.

## 2018-04-02 NOTE — CONSULT NOTE ADULT - SUBJECTIVE AND OBJECTIVE BOX
·  Subjective and Objective:    85-year-old male with chronic medical issues; declining health over live several months;  Had syncopal episode at home and sustained laceration to the back of his skull;   patient initially evaluated in ED,  3/27, Hemoglobin 12.3, hematocrit 35.6; CT head noncontrast apparently negative   sent home N readmitted with syncope 3/28  noted to have drop in hemoglobin while here in Hospital.  ER evaluation  March 27  hemoglobin 12.3, hematocrit 35.6.;  April 1 Hgb.9 hematocrit 23    Past medical history diabetes, hypertension, atrial fibrillation on anticoagulation, mitral valve clipping, gastrointestinal bleeding relating to AVMs, history of thickened esophagus questionable erosive esophagitis.mild dementia, obstructive sleep apnea    patient's daughter states that after the 1st fall and significant laceration to the back of his skull, relative stated it looked  like a" Murder scene " there was so much blood on the floor    physical examination fatigue chronically ill-looking 85-year-old male no acute distress  Skin warm pale dry poor turgor ecchymoses on extremities  HEENT back of the head with staples in place, dried blood on laceration behind left ear  Oropharynx without exudate  Neck supple  lungs coarse breath sounds , poor inspiratory effort  Heart regular rhythm 2/6 murmur  abdomen soft, no masses, bowel sounds present  Extremities without pitting edema    Laboratory March 29, 2018 WBC 5.6 Ht B8.5 HCT 24.2 platelet 129  Iron studies iron 43, percent saturation 19, B12 folate normal  INR 3.49  haptoglobin 51,  both normal             Assessment and Recommendation:      ·  Assessment	   Impression 85-year-old male with multiple chronic medical problems including chronic kidney disease, heart disease, atrial fibrillation, chronically on Coumadin, history of gastrointestinal bleeding related to AVMs, history of  distal esophagitis, now status post fall and recurrent episodes of syncope.  Suffered a laceration to the back of his head requiring surgical staples,significant blood on the floor/bleeding as per patient's daughter  Acute drop in hemoglobin  anemia is probably multifactorial in origin  Precipitous drop after trauma is probably related to bleeding, and subsequent hydration here in Hospital;  Patient has a slight anemia related to renal insufficiency, and intermittent modest blood loss while on anticoagulation and possibly related to previous history of AVMs  of note is Decreased iron / borderline iron saturation.  normal haptoglobin and LDH make intravascular hemolysis unlikely    Plan: mild chronic medical issues would suggest transfusion packed red blood cells  Offeron iron/folic acid  supplementation  consider repeat radiographic studies of head and/or abdomen/pelvis to rule out delayed bleeding/hematoma  Above reviewed with hospitalist  later daughter at bedside

## 2018-04-02 NOTE — CONSULT NOTE ADULT - SUBJECTIVE AND OBJECTIVE BOX
Date of Service: 04-02-18 @ 11:38    HPI: 86 yo male with extensive h/o CAD s/p CABG, chronic a. fib (on coumadin), h/o Mitral valve clipping, HTN, HLD, CKD stage III, Anemia of chronic disease, h/o colonic AVM, DM2, SILVERIO, dementia, hiatal hernia is seen and evaluated for left 2nd toe pain, redness and swelling. Pt was admitted on 3/28 s/p multiple falls and syncope. Pt poor historian secondary to dementia and was not responding to most questions. Pt is awake and knows the current time and place Pt reports left 2nd toe pain and was not able to give a history of the toe pain or other review of systems.       REVIEW OF SYSTEMS: not able to fully assess 2/2 dementia     Past Medical History:  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    BPH (benign prostatic hyperplasia)    CAD (coronary artery disease)  S/P CABG  Chronic atrial fibrillation    CKD (chronic kidney disease)    DM (diabetes mellitus screen)    Hearing loss    Hiatal hernia    HLD (hyperlipidemia)    HTN (hypertension)    Memory loss, short term    MR (mitral regurgitation)    SILVERIO (obstructive sleep apnea)  on CPAP  TIA (transient ischemic attack)    Transfusion history    Weight loss.    Past Surgical History:  H/O cystoscopy  TURP  H/O heart surgery  Clip  History of hernia surgery    History of tonsillectomy    S/P CABG x 4.    Family History:  Father  Still living? No  Family history of myocardial infarction, Age at diagnosis: Age Unknown.    Social History:  denies tobacco, etoh or drug use    Allergies    No Known Allergies    Intolerances      MEDICATIONS  (STANDING):  cholecalciferol 2000 Unit(s) Oral daily  dextrose 5%. 1000 milliLiter(s) IV Continuous <Continuous>  dextrose 50% Injectable 12.5 Gram(s) IV Push once  dextrose 50% Injectable 25 Gram(s) IV Push once  dextrose 50% Injectable 25 Gram(s) IV Push once  fludroCORTISONE 0.1 milliGRAM(s) Oral daily  folic acid 1 milliGRAM(s) Oral daily  insulin lispro (HumaLOG) corrective regimen sliding scale   SubCutaneous three times a day before meals  insulin lispro (HumaLOG) corrective regimen sliding scale   SubCutaneous at bedtime  pantoprazole  Injectable 40 milliGRAM(s) IV Push daily  simvastatin 40 milliGRAM(s) Oral at bedtime  sodium chloride 0.9%. 1000 milliLiter(s) IV Continuous <Continuous>  sodium chloride 0.9%. 1000 milliLiter(s) IV Continuous <Continuous>  sodium ferric gluconate complex IVPB 125 milliGRAM(s) IV Intermittent daily    MEDICATIONS  (PRN):  acetaminophen   Tablet. 650 milliGRAM(s) Oral every 6 hours PRN Mild Pain (1 - 3)  dextrose Gel 1 Dose(s) Oral once PRN Blood Glucose LESS THAN 70 milliGRAM(s)/deciliter  docusate sodium 100 milliGRAM(s) Oral two times a day PRN Constipation  glucagon  Injectable 1 milliGRAM(s) IntraMuscular once PRN Glucose LESS THAN 70 milligrams/deciliter  senna 2 Tablet(s) Oral at bedtime PRN Constipation      VITALS:  Vital Signs Last 24 Hrs  T(C): 36.3 (04-02-18 @ 10:22), Max: 37 (04-01-18 @ 18:55)  T(F): 97.3 (04-02-18 @ 10:22), Max: 98.6 (04-01-18 @ 18:55)  HR: 69 (04-02-18 @ 10:22) (62 - 81)  BP: 120/52 (04-02-18 @ 10:22) (93/51 - 137/58)  BP(mean): --  RR: 17 (04-02-18 @ 10:22) (17 - 18)  SpO2: 100% (04-02-18 @ 10:22) (90% - 100%)        PHYSICAL EXAM:    Constitutional: Awake, NAD, comfortable, demented   Lower Extremity Exam:  Left lower extremity is larger and more edematous than the right   Derm:  Left foot: erythema and edema circumscribed the 2nd toe extending around the dorsal 2nd MPJ. Diffused dry skin. No open lesion, no malodor, no fluctuance, no purulence, no active drainage. No interdigital maceration or fissure. Nails are trimmed.   Right foot: Diffused dry skin. No open lesion, no malodor, no fluctuance, no purulence, no active drainage, no erythema, no clinical signs of infection. No interdigital maceration or fissure. Nails are trimmed.   Vascular:   Pedal pulses palpable 1/4 DP and PT on the right. Non-palpable DP and PT on the L 2/2 edema. Diffused pitting edema, L>R. Pedal hair absent b/l.   Neuro: was not able to assess  Ortho: pain upon palpation of the left 2nd toe and left 2nd MPJ ROM. No pain upon light touch of the left 2nd toe. No pain upon palpation and ROM of the remaining of the left foot and the right foot including toes, STJ, and ankle ROM. Was not able to fully assess during this visit.           LABS:                          9.4    6.41  )-----------( 139      ( 02 Apr 2018 06:10 )             27.7       04-02    140  |  106  |  50<H>  ----------------------------<  190<H>  4.4   |  30  |  1.48<H>    Ca    8.8      02 Apr 2018 06:10        PT/INR - ( 02 Apr 2018 06:10 )   PT: 27.9 sec;   INR: 2.53 ratio             .Urine None  03-28 @ 14:42   No growth  --  --    RADIOLOGY:    < from: Xray Foot AP + Lateral, Left (04.01.18 @ 15:55) >    EXAM:  XR FOOT-LEFT-2 VIEWS                            PROCEDURE DATE:  04/01/2018          INTERPRETATION:  Radiographs of the left foot         CLINICAL INFORMATION:   Foot pain.    TECHNIQUE:  Frontal, oblique and lateral views of the foot were obtained.    FINDINGS:   No prior similar studies are available for review.    There is degenerative change at the first MTP joint with a screw within   the distal first metatarsal.    The hindfoot appears intact.  No significant spur formation is   recognized.  The soft tissues appear intact.    Atherosclerotic vascular calcifications are noted.    IMPRESSION: No acute injury. Previous surgery of the first metatarsal   with placement of screw and degenerative change at the first MTP joint.      < from: CT Abdomen and Pelvis No Cont (04.01.18 @ 14:41) >    EXAM:  CT ABDOMEN AND PELVIS                            PROCEDURE DATE:  04/01/2018          INTERPRETATION:  Clinical information: Anemia and syncope, abdominal pain    Comparison: 3/20    PROCEDURE:     CT of the Abdomen and Pelvis was performed without intravenous contrast.    Evaluation of abdominal and pelvic viscera, lymph nodes and vasculature   is limited without intravenous contrast.    FINDINGS:    LOWER CHEST: Large left hiatal hernia with intrathoracic stomach, grossly   stable. Cardiomegaly. Small left pleural effusion.    ABDOMEN:  LIVER: within normal limits  BILE DUCTS: normal caliber  GALLBLADDER: Cholelithiasis  PANCREAS: within normal limits  SPLEEN: within normal limits  ADRENALS: within normal limits  KIDNEYS: Mild lefthydronephrosis. 1.5 cm left renal lesion,   indeterminate as previously noted and corresponding to a complex cyst.    PELVIS:  REPRODUCTIVE ORGANS: Enlarged prostate. Osborne catheter inflated within   the apex of the prostate.  BLADDER: Thickened withextensive perivesical inflammatory changes. The   wall is trabeculated.    BOWEL: Scattered diverticulosis. Large amount of stool in the colon. No   bowel obstruction.  PERITONEUM: no ascites or free air, no fluid collection  RETROPERITONEUM: no enlarged retroperitoneal or pelvic nodes. No   hemorrhage.    VESSELS: Extensive arterial calcifications.  ABDOMINAL WALL: Right inguinal hernia repair.  MUSCULOSKELETAL: within normal limits.    IMPRESSION:     No retroperitoneal hemorrhage.    Osborne catheter inflated within the apex of the prostate gland.    Thickened urinary bladder with surrounding inflammatory changes,   correlate for cystitis, new from 3/28.    Otherwise no acute findings provided the limitation of a noncontrast exam.    Findings were discussed with Physician: COLTON PATIÑO [id number] with   a read back at 3:18 PM.    < from: CT Chest No Cont (03.28.18 @ 14:38) >    EXAM:  CT ABDOMEN AND PELVIS                          EXAM:  CT CHEST                            *** ADDENDUM 03/28/2018  ***    Renal ultrasound is recommended for further evaluation of the   indeterminate hyperdense left kidney lesion.      *** END OF ADDENDUM 03/28/2018  ***      PROCEDURE DATE:  03/28/2018          INTERPRETATION:  Clinical information: Multiple falls    COMPARISON: None    PROCEDURE:   CT of the Chest, Abdomen and Pelvis was performed without intravenous   contrast.   Intravenous contrast: None.  Oral contrast: None.  Sagittal and coronal reformats were performed.    FINDINGS:    CHEST:     LOWER NECK: Within normal limits.  AXILLA, MEDIASTINUM AND GWYN: No lymphadenopathy or mediastinal   hemorrhage.  VESSELS: Normal caliber aorta. Atherosclerotic arterial calcifications,   including coronary artery calcification.  HEART: The heart is enlarged.No pericardial effusion.  PLEURA: No pleural effusion or pneumothorax.  LUNGS AND LARGE AIRWAYS: Patent central airways. No pulmonary nodule,   mass or consolidation.  CHEST WALL:  Unremarkable    ABDOMEN AND PELVIS:    LIVER: Within normal limits.  SPLEEN: Within normal limits.  PANCREAS: Within normal limits.  GALLBLADDER: Cholelithiasis.  BILE DUCTS: Normal caliber.  ADRENALS: Within normal limits.  KIDNEYS/URETERS: No stone or hydronephrosis. 1.6 cm hyperdense   indeterminate lesion in the interpolar left kidney.    RETROPERITONEUM: No lymphadenopathy or hemorrhage.    VESSELS:  Within normal limits.      BOWEL: No bowel obstruction, wall thickening or inflammatory change.   Appendix normal. Moderate hiatal hernia.  PERITONEUM: No ascites, hemorrhage or pneumoperitoneum.    REPRODUCTIVE ORGANS: Moderate prostatomegaly.  BLADDER: Markedly distended urinary bladder.    ABDOMINAL WALL: Within normal limits.  BONES: No fractures.    IMPRESSION:     No traumatic injury.  Markedly distended urinary bladder.    Findings discussed with Dr. Medellin on March 28, 2018, 3:00 PM.        < from: CT Head No Cont (03.28.18 @ 14:38) >    EXAM:  CT BRAIN                            PROCEDURE DATE:  03/28/2018          INTERPRETATION:      CT head without IV contrast        CLINICAL INFORMATION:  Fall   Intracranial hemorrhage.    TECHNIQUE: Contiguous axial 5 mm sections were obtained through the head.   Sagittal and coronal 2-D reformatted images were also obtained.   This   scan was performed using automatic exposure control (radiation dose   reduction software) to obtain a diagnostic image quality scan with   patient dose as low as reasonably achievable.     FINDINGS:   CT dated 3/27/2018 available for review.    The brain demonstrates moderate periventricular and deep white matter   ischemia unchanged.   No acute cerebral cortical infarct is seen.  No   intracranial hemorrhage is found.  No mass effect is found in the brain.      The ventricles, sulci and basal cisterns show mild atrophy.         The orbits are unremarkable.  The paranasal sinuses are clear.  The nasal   cavity appears intact.  The nasopharynx is symmetric.  The central skull   base, petrous temporal bones and calvarium remain intact. LEFT parietal   scalp hematoma is noted with skin staples.      IMPRESSION:   Moderate periventricular and deep white matter ischemia is   unchanged.    LEFT parietal scalp hematoma is noted with skin staples.

## 2018-04-02 NOTE — PROGRESS NOTE ADULT - SUBJECTIVE AND OBJECTIVE BOX
CC: syncope/fall (28 Mar 2018 17:53)    HPI from ED:  84 yo male with extensive PMH of CAD s/p CABG, chronic a. fib (on coumadin), h/o Mitral valve clipping, HTN, HLD, CKD stage III, Anemia of chronic disease, h/o colonic AVM, DM2, SILVERIO, dementia, hiatal hernia presents to ED s/p multiple falls and syncope. Pt poor historian secondary to dementia and history obtained from family at bedside. Pt presented to ED on 3/27 after episode of syncope as per family and hit the back of his head on the ground. He was found to have a head laceration which was repaired with staples. CT head at the time was negative and pt was discharged home. While patient was walking into his house he had another episode of syncope which lasted a few seconds as per son. He did not hit his head this time. He then had 2 other episodes of near syncope which he felt weak but did not lose consciousness. He went to sleep downstairs on the couch and when the family woke up this morning he was found on the ground. Currently pt resting comfortably and is pleasantly confused. A+Ox2.  Denies any fevers, chills, headaches, dizziness, palpitations, chest pain, SOB, abd pain, N/V, diarrhea. (28 Mar 2018 17:53)    3/29: Pt was seen and examined, confused, Pts wife at bedside, confirmed history above, denies any recent illness, no fevers or chills, no SOB or cough, +  urinary incontinence, no dysuria. reports that Pt lost about 40LBS in past few months, states that her son now lives  with them and they are on "healthy" diet. Also she is not sure why Pt is on torsemide, but was never told about CHF or " water in the Lungs" .   Results of work up and  further POC discussed.         3/30: Pt was seen and examined with NP this am, looks more alert, know where he is, why in the hospital. Denies dizziness. As per RN No Bm. Has good appetite. Ghosh placed yesterday, draining dark urine     3/31: Pt was seen and examined this am, c/o feeling tired and wants to rest. As per RN was checking Orthostatics and Pt was c/o lightheadedness on changing position from laying to sitting and didnt want to stand up. otherwise no issues. Good oral intake. Completed IVF.     4/1: Pt was seen and examined, OOB  to chair, min verbal output.  Denies any SOB or pain. Unable to say what had for breakfast.  As per RN no events.  Noted Family at bedside late, as per daughter, Pt declined in past 6 months significantly physically and cognitively, also Pt noted to understand that cognitively not there and gets upset.  Family is asking for psych eval for possible depression     4/2: Pt  was seen and examined, OOB to jayro-chair in the hallway, quiet, answers Qs but would not make eye contact. Admitted that has L 2nd toe pain when was asked, but otherwise denies any complains.     Vital Signs Last 24 Hrs  T(C): 36.4 (02 Apr 2018 16:34), Max: 37 (01 Apr 2018 18:55)  T(F): 97.6 (02 Apr 2018 16:34), Max: 98.6 (01 Apr 2018 18:55)  HR: 69 (02 Apr 2018 10:22) (62 - 81)  BP: 122/60 (02 Apr 2018 16:34) (120/52 - 137/58)  RR: 17 (02 Apr 2018 16:34) (17 - 18)  SpO2: 94% (02 Apr 2018 16:34) (94% - 100%)      REVIEW OF SYSTEMS:  unable to obtain as Pt has dementia     PHYSICAL EXAM:  General: Well developed; malnourished; in no acute distress  Eyes: PERRLA, EOMI; conjunctiva and sclera clear  Head: Normocephalic; + laceration, repaired, no erythema, no drainage    ENMT: No nasal discharge; airway clear  Neck: Supple; non tender; no masses  Respiratory: Good air entry, No wheezes, rales or rhonchi  Cardiovascular: Regular rate and rhythm. S1 and S2 Normal; No murmurs  Gastrointestinal: Soft non-tender non-distended; Normal bowel sounds  Genitourinary: No costovertebral angle tenderness, Ghosh in place, draining yellow urine with sediment   Extremities: Normal range of motion, No edema  Neurological: Alert and oriented x2, non focal   Skin: Warm and dry. No acute rash  Musculoskeletal: Normal muscle  tone, without deformities  Psychiatric: Cooperative and appropriate        Labs & Radiology:                         9.4    6.41  )-----------( 139      ( 02 Apr 2018 06:10 )             27.7     04-02    140  |  106  |  50<H>  ----------------------------<  190<H>  4.4   |  30  |  1.48<H>    Ca    8.8      02 Apr 2018 06:10      PT/INR - ( 02 Apr 2018 06:10 )   PT: 27.9 sec;   INR: 2.53 ratio                              7.9    6.34  )-----------( 128      ( 01 Apr 2018 06:27 )             23.3     04-01    142  |  108  |  44<H>  ----------------------------<  153<H>  4.9   |  29  |  1.31<H>    Ca    9.1      01 Apr 2018 06:27    PT/INR - ( 01 Apr 2018 06:27 )   PT: 31.1 sec;   INR: 2.82 ratio                                  7.9    x     )-----------( x        ( 31 Mar 2018 14:05 )             23.5     03-31    140  |  106  |  44<H>  ----------------------------<  263<H>  4.5   |  28  |  1.29    Ca    8.8      31 Mar 2018 06:24    PT/INR - ( 31 Mar 2018 06:24 )   PT: 38.7 sec;   INR: 3.49 ratio    PTT - ( 29 Mar 2018 21:13 )  PTT:32.4 sec                      8.3    6.77  )-----------( 130      ( 30 Mar 2018 06:20 )             24.2     03-30    137  |  104  |  48<H>  ----------------------------<  297<H>  4.1   |  26  |  1.35<H>    Ca    8.5      30 Mar 2018 06:20  Phos  2.5     03-29  Mg     2.8     03-29    TPro  6.3  /  Alb  3.3  /  TBili  0.8  /  DBili  x   /  AST  19  /  ALT  23  /  AlkPhos  64  03-28    PT/INR - ( 30 Mar 2018 06:20 )   PT: 28.4 sec;   INR: 2.58 ratio         PTT - ( 29 Mar 2018 21:13 )  PTT:32.4 sec    Hemoglobin A1C, Whole Blood: 7.3 % (03-29-18 @ 06:12)  Vitamin B12, Serum in AM (03.29.18 @ 06:12)    Vitamin B12, Serum: 1600: Note: Reference Range Change on 12/18/2017. pg/mL    Folate, Serum in AM (03.29.18 @ 06:12)    Folate, Serum: >20.0 ng/mL        CT Cervical Spine:  IMPRESSION:   No vertebral fracture is recognized.  Grade 1 anterior   spondylolisthesis at C6-7 on a degenerative basis.    Narrowing of the   LEFT C2-3, BILATERAL C3-4, LEFT C4-5, LEFT C5-6 and C6-7 neural foramina   due to uncovertebral spurring and facet osteophytic hypertrophy.     CT Abd/pelvis/chest:      IMPRESSION: No traumatic injury.  Markedly distended urinary bladder.   Renal ultrasound is recommended for further evaluation of the   indeterminate hyperdense left kidney lesion.      CT Head:  IMPRESSION:   Moderate periventricular and deep white matter ischemia is   unchanged.    LEFT parietal scalp hematoma is noted with skin staples.    SHOULDER XRAY:  IMPRESSION:   No prior examinations are available for review.    No fracture or dislocation is identified.  Acromioclavicular joint is   intact. Soft tissues are intact. Median sternotomy.                    MEDICATIONS  (STANDING):  acetaminophen  IVPB. 1000 milliGRAM(s) IV Intermittent once  cholecalciferol 2000 Unit(s) Oral daily  dextrose 5%. 1000 milliLiter(s) (50 mL/Hr) IV Continuous <Continuous>  dextrose 50% Injectable 12.5 Gram(s) IV Push once  dextrose 50% Injectable 25 Gram(s) IV Push once  dextrose 50% Injectable 25 Gram(s) IV Push once  fludroCORTISONE 0.1 milliGRAM(s) Oral daily  folic acid 1 milliGRAM(s) Oral daily  insulin lispro (HumaLOG) corrective regimen sliding scale   SubCutaneous three times a day before meals  insulin lispro (HumaLOG) corrective regimen sliding scale   SubCutaneous at bedtime  pantoprazole  Injectable 40 milliGRAM(s) IV Push daily  polyethylene glycol 3350 17 Gram(s) Oral daily  simvastatin 40 milliGRAM(s) Oral at bedtime  sodium chloride 0.9%. 1000 milliLiter(s) (50 mL/Hr) IV Continuous <Continuous>  sodium chloride 0.9%. 1000 milliLiter(s) (50 mL/Hr) IV Continuous <Continuous>  sodium ferric gluconate complex IVPB 125 milliGRAM(s) IV Intermittent daily    MEDICATIONS  (PRN):  acetaminophen   Tablet. 650 milliGRAM(s) Oral every 6 hours PRN Mild Pain (1 - 3)  dextrose Gel 1 Dose(s) Oral once PRN Blood Glucose LESS THAN 70 milliGRAM(s)/deciliter  docusate sodium 100 milliGRAM(s) Oral two times a day PRN Constipation  glucagon  Injectable 1 milliGRAM(s) IntraMuscular once PRN Glucose LESS THAN 70 milligrams/deciliter  senna 2 Tablet(s) Oral at bedtime PRN Constipation            HOSPITAL COURSE BY PROBLEM:  ASSESSMENT & PLAN:    # Multiple falls with syncope and near syncope  - likely  Orthostatic due to dehydration and hypovolemia   - Orthostatic VS  improved after IVF ,  and started on Florinef   - monitor on tele, afib 40s-70s  - CT head neg   - CT head negative  - trop neg x 3  - echo results noted, valves stable, EF normal   - completed IVF, monitor off, encourage oral hydration, needs help   - PT       # EM on CKD stage III with Azotemia   - likely dehydration and obstruction   - CR down to normal with IVF, but start trending up on oral hydration   - hold torsemide ( as per son dose was increased in past few weeks)   - watch off  IV fluids  - c/w Ghosh cath, monitor UO  - Repeat CT: ghosh  not in place, repositioned by nursing staff,  draining well now. L mild hydro? Monitor if renal Fx worse might need follow up renal sono  - Renal Sono with L complex cyst.   - Monitor UO and Renal Fx     # Acute on Chronic Anemia of blood loss?   - h/o AVM  GI bleed,  and as per family bled significantly from laceration  - baseline hgb about 12, possibly has dilutional component  - S/p 1U PRBcs   - No signs of  acute bleeding now   - IN EMR had EGD 3/7 with gastric  erosions  and bleeding stigmata?   - CT Chest/abd/pelvis neg for bleeding on admission and repeat CT on 4/1   - B12, folate WNL  - stool occult blood neg   - Iron panel: Iron deficiency and inflammation   - Retic count appropriate   - Monitor H/H closely  - C/w PPI  - Iron supplement   - D/w Dr Lee, considering Pt is still symptomatic  and did have H/H drop, with elevated INR,  will transfuse PRBCs       # Chronic A. fib. SupraTx INR   - rate controlled  - INR daily,  2.5 today  - restart  coumadin       # Dementia with possible Depression  - supportive care   - Psych eval       # DM2  - HgbA1c = 7.3%  - hold januvia  - ISS, finger sticks    # HLD  - continue statin    # DVT prophylaxis  - on coumadin      Discussed results and further POC with Pts  wife (HCP) , agrees if  Pt needs GELY

## 2018-04-03 LAB
ANION GAP SERPL CALC-SCNC: 5 MMOL/L — SIGNIFICANT CHANGE UP (ref 5–17)
BUN SERPL-MCNC: 49 MG/DL — HIGH (ref 7–23)
CALCIUM SERPL-MCNC: 9.1 MG/DL — SIGNIFICANT CHANGE UP (ref 8.5–10.1)
CHLORIDE SERPL-SCNC: 107 MMOL/L — SIGNIFICANT CHANGE UP (ref 96–108)
CO2 SERPL-SCNC: 28 MMOL/L — SIGNIFICANT CHANGE UP (ref 22–31)
CREAT SERPL-MCNC: 1.36 MG/DL — HIGH (ref 0.5–1.3)
GLUCOSE BLDC GLUCOMTR-MCNC: 143 MG/DL — HIGH (ref 70–99)
GLUCOSE BLDC GLUCOMTR-MCNC: 216 MG/DL — HIGH (ref 70–99)
GLUCOSE BLDC GLUCOMTR-MCNC: 277 MG/DL — HIGH (ref 70–99)
GLUCOSE BLDC GLUCOMTR-MCNC: 297 MG/DL — HIGH (ref 70–99)
GLUCOSE SERPL-MCNC: 192 MG/DL — HIGH (ref 70–99)
HCT VFR BLD CALC: 27.8 % — LOW (ref 39–50)
HGB BLD-MCNC: 9.6 G/DL — LOW (ref 13–17)
INR BLD: 2.29 RATIO — HIGH (ref 0.88–1.16)
MCHC RBC-ENTMCNC: 33.2 PG — SIGNIFICANT CHANGE UP (ref 27–34)
MCHC RBC-ENTMCNC: 34.5 GM/DL — SIGNIFICANT CHANGE UP (ref 32–36)
MCV RBC AUTO: 96.2 FL — SIGNIFICANT CHANGE UP (ref 80–100)
NRBC # BLD: 0 /100 WBCS — SIGNIFICANT CHANGE UP (ref 0–0)
PLATELET # BLD AUTO: 156 K/UL — SIGNIFICANT CHANGE UP (ref 150–400)
POTASSIUM SERPL-MCNC: 4.6 MMOL/L — SIGNIFICANT CHANGE UP (ref 3.5–5.3)
POTASSIUM SERPL-SCNC: 4.6 MMOL/L — SIGNIFICANT CHANGE UP (ref 3.5–5.3)
PROT SERPL-MCNC: 5 G/DL — LOW (ref 6–8.3)
PROT SERPL-MCNC: 5 G/DL — LOW (ref 6–8.3)
PROTHROM AB SERPL-ACNC: 25.1 SEC — HIGH (ref 9.8–12.7)
RBC # BLD: 2.89 M/UL — LOW (ref 4.2–5.8)
RBC # FLD: 15.7 % — HIGH (ref 10.3–14.5)
SODIUM SERPL-SCNC: 140 MMOL/L — SIGNIFICANT CHANGE UP (ref 135–145)
WBC # BLD: 6.63 K/UL — SIGNIFICANT CHANGE UP (ref 3.8–10.5)
WBC # FLD AUTO: 6.63 K/UL — SIGNIFICANT CHANGE UP (ref 3.8–10.5)

## 2018-04-03 RX ORDER — VANCOMYCIN HCL 1 G
500 VIAL (EA) INTRAVENOUS EVERY 12 HOURS
Qty: 0 | Refills: 0 | Status: DISCONTINUED | OUTPATIENT
Start: 2018-04-03 | End: 2018-04-07

## 2018-04-03 RX ORDER — VANCOMYCIN HCL 1 G
500 VIAL (EA) INTRAVENOUS ONCE
Qty: 0 | Refills: 0 | Status: COMPLETED | OUTPATIENT
Start: 2018-04-03 | End: 2018-04-03

## 2018-04-03 RX ORDER — CEFTRIAXONE 500 MG/1
INJECTION, POWDER, FOR SOLUTION INTRAMUSCULAR; INTRAVENOUS
Qty: 0 | Refills: 0 | Status: DISCONTINUED | OUTPATIENT
Start: 2018-04-03 | End: 2018-04-07

## 2018-04-03 RX ORDER — VANCOMYCIN HCL 1 G
VIAL (EA) INTRAVENOUS
Qty: 0 | Refills: 0 | Status: DISCONTINUED | OUTPATIENT
Start: 2018-04-03 | End: 2018-04-07

## 2018-04-03 RX ORDER — PANTOPRAZOLE SODIUM 20 MG/1
40 TABLET, DELAYED RELEASE ORAL
Qty: 0 | Refills: 0 | Status: DISCONTINUED | OUTPATIENT
Start: 2018-04-03 | End: 2018-04-07

## 2018-04-03 RX ORDER — CEFTRIAXONE 500 MG/1
INJECTION, POWDER, FOR SOLUTION INTRAMUSCULAR; INTRAVENOUS
Qty: 0 | Refills: 0 | Status: DISCONTINUED | OUTPATIENT
Start: 2018-04-03 | End: 2018-04-03

## 2018-04-03 RX ORDER — ESCITALOPRAM OXALATE 10 MG/1
5 TABLET, FILM COATED ORAL DAILY
Qty: 0 | Refills: 0 | Status: DISCONTINUED | OUTPATIENT
Start: 2018-04-03 | End: 2018-04-07

## 2018-04-03 RX ORDER — CEFTRIAXONE 500 MG/1
1000 INJECTION, POWDER, FOR SOLUTION INTRAMUSCULAR; INTRAVENOUS EVERY 24 HOURS
Qty: 0 | Refills: 0 | Status: DISCONTINUED | OUTPATIENT
Start: 2018-04-04 | End: 2018-04-07

## 2018-04-03 RX ORDER — CEFTRIAXONE 500 MG/1
1000 INJECTION, POWDER, FOR SOLUTION INTRAMUSCULAR; INTRAVENOUS ONCE
Qty: 0 | Refills: 0 | Status: COMPLETED | OUTPATIENT
Start: 2018-04-03 | End: 2018-04-03

## 2018-04-03 RX ORDER — TAMSULOSIN HYDROCHLORIDE 0.4 MG/1
0.4 CAPSULE ORAL AT BEDTIME
Qty: 0 | Refills: 0 | Status: DISCONTINUED | OUTPATIENT
Start: 2018-04-03 | End: 2018-04-07

## 2018-04-03 RX ADMIN — FLUDROCORTISONE ACETATE 0.1 MILLIGRAM(S): 0.1 TABLET ORAL at 06:29

## 2018-04-03 RX ADMIN — Medication 2000 UNIT(S): at 12:16

## 2018-04-03 RX ADMIN — Medication 100 MILLIGRAM(S): at 22:11

## 2018-04-03 RX ADMIN — Medication 3: at 12:16

## 2018-04-03 RX ADMIN — Medication 1 MILLIGRAM(S): at 12:16

## 2018-04-03 RX ADMIN — SIMVASTATIN 40 MILLIGRAM(S): 20 TABLET, FILM COATED ORAL at 22:20

## 2018-04-03 RX ADMIN — Medication 100 MILLIGRAM(S): at 13:45

## 2018-04-03 RX ADMIN — Medication 1: at 22:19

## 2018-04-03 RX ADMIN — PANTOPRAZOLE SODIUM 40 MILLIGRAM(S): 20 TABLET, DELAYED RELEASE ORAL at 12:17

## 2018-04-03 RX ADMIN — CEFTRIAXONE 1000 MILLIGRAM(S): 500 INJECTION, POWDER, FOR SOLUTION INTRAMUSCULAR; INTRAVENOUS at 12:17

## 2018-04-03 RX ADMIN — Medication 2: at 08:49

## 2018-04-03 RX ADMIN — ESCITALOPRAM OXALATE 5 MILLIGRAM(S): 10 TABLET, FILM COATED ORAL at 13:45

## 2018-04-03 RX ADMIN — TAMSULOSIN HYDROCHLORIDE 0.4 MILLIGRAM(S): 0.4 CAPSULE ORAL at 22:11

## 2018-04-03 RX ADMIN — Medication 110 MILLIGRAM(S): at 12:24

## 2018-04-03 RX ADMIN — POLYETHYLENE GLYCOL 3350 17 GRAM(S): 17 POWDER, FOR SOLUTION ORAL at 12:24

## 2018-04-03 NOTE — PROGRESS NOTE ADULT - ASSESSMENT
84 yo male with extensive PMH of CAD s/p CABG, chronic a. fib (on coumadin), h/o Mitral valve clipping, HTN, HLD, CKD stage III, Anemia of chronic disease, h/o colonic AVM, DM2, SILVERIO, dementia, hiatal hernia presents to ED s/p multiple falls and syncope.     1. +Orthostatics- Continue Florinef 0.1 mg. Encourage PO fluids. Repeat orthostatics improved.      2. 2Decho- Nl LV fxn, mod MR. Report above.     3. Anemia- 9.6 today. If drops below 8, would transfuse another unit as pt was previously symptomatic. W/u as per primary team.     4. Afib- rate controlled. Cont coumadin keep INR 2-3.     5. Dyslipidemia- cont statin.     6. DVT proph, replete lytes daily.

## 2018-04-03 NOTE — PROGRESS NOTE ADULT - SUBJECTIVE AND OBJECTIVE BOX
Date of Service 04-03-18 @ 08:30    Subjective: 84 yo male with extensive h/o CAD s/p CABG, chronic a. fib (on coumadin), h/o Mitral valve clipping, HTN, HLD, CKD stage III, Anemia of chronic disease, h/o colonic AVM, DM2, SILVERIO, dementia, hiatal hernia, TIA is seen at bedside for followup care of left 2nd toe pain, redness and swelling. Pt reports numbness in all toes b/l today. Pt denies any other lower extremity pain or discomfort. Pt denies fever, chills, nausea, vomiting, SOB, chest pain.     PMH: as above  Allergies: No Known Allergies    MEDICATIONS:  MEDICATIONS  (STANDING):  cholecalciferol 2000 Unit(s) Oral daily  dextrose 5%. 1000 milliLiter(s) (50 mL/Hr) IV Continuous <Continuous>  dextrose 50% Injectable 12.5 Gram(s) IV Push once  dextrose 50% Injectable 25 Gram(s) IV Push once  dextrose 50% Injectable 25 Gram(s) IV Push once  fludroCORTISONE 0.1 milliGRAM(s) Oral daily  folic acid 1 milliGRAM(s) Oral daily  insulin lispro (HumaLOG) corrective regimen sliding scale   SubCutaneous three times a day before meals  insulin lispro (HumaLOG) corrective regimen sliding scale   SubCutaneous at bedtime  pantoprazole  Injectable 40 milliGRAM(s) IV Push daily  polyethylene glycol 3350 17 Gram(s) Oral daily  simvastatin 40 milliGRAM(s) Oral at bedtime  sodium chloride 0.9%. 1000 milliLiter(s) (50 mL/Hr) IV Continuous <Continuous>  sodium chloride 0.9%. 1000 milliLiter(s) (50 mL/Hr) IV Continuous <Continuous>  sodium ferric gluconate complex IVPB 125 milliGRAM(s) IV Intermittent daily    Vital Signs Last 24 Hrs  T(C): 36.8 (03 Apr 2018 05:19), Max: 36.8 (03 Apr 2018 05:19)  T(F): 98.2 (03 Apr 2018 05:19), Max: 98.2 (03 Apr 2018 05:19)  HR: 61 (03 Apr 2018 05:19) (61 - 69)  BP: 138/70 (03 Apr 2018 05:19) (120/52 - 141/63)  BP(mean): --  RR: 17 (03 Apr 2018 05:19) (17 - 17)  SpO2: 100% (03 Apr 2018 05:19) (94% - 100%)      CAPILLARY BLOOD GLUCOSE      POCT Blood Glucose.: 216 mg/dL (03 Apr 2018 07:53)  POCT Blood Glucose.: 295 mg/dL (02 Apr 2018 22:22)  POCT Blood Glucose.: 368 mg/dL (02 Apr 2018 22:20)  POCT Blood Glucose.: 235 mg/dL (02 Apr 2018 16:48)  POCT Blood Glucose.: 204 mg/dL (02 Apr 2018 11:57)      PHYSICAL EXAM:    Constitutional: NAD, awake and alert  Lower Extremity Exam:  Left lower extremity is larger and more edematous than the right   Derm:  Left foot: erythema and edema circumscribed the 2nd toe extending around the dorsal 2nd MPJ. Diffused dry skin. No open lesion, no malodor, no fluctuance, no purulence, no active drainage. No interdigital maceration or fissure. Nails are trimmed.   Right foot: Diffused dry skin. No open lesion, no malodor, no fluctuance, no purulence, no active drainage, no erythema, no clinical signs of infection. No interdigital maceration or fissure. Nails are trimmed.   Vascular: Left 2nd toe is warmer than the rest of the toes. Pedal pulses palpable 1/4 DP and PT on the right. Non-palpable DP and PT on the L 2/2 edema. Pitting edema, L>R. Pedal hair absent b/l.   Neuro: was not able to assess  Ortho: pain upon palpation of the left 2nd toe and left 2nd MPJ ROM. No pain upon light touch of the left 2nd toe. No pain upon palpation and ROM of the remaining of the left foot and the right foot including toes, STJ, and ankle ROM. Was not able to fully assess during this visit.     LABS: All Labs Reviewed:                        9.6    6.63  )-----------( 156      ( 03 Apr 2018 06:24 )             27.8     04-03    140  |  107  |  49<H>  ----------------------------<  192<H>  4.6   |  28  |  1.36<H>    Ca    9.1      03 Apr 2018 06:24      PT/INR - ( 03 Apr 2018 06:24 )   PT: 25.1 sec;   INR: 2.29 ratio               Blood Culture:     RADIOLOGY/EKG:  .Urine None  03-28 @ 14:42   No growth  --  --    RADIOLOGY:    < from: Xray Foot AP + Lateral, Left (04.01.18 @ 15:55) >    EXAM:  XR FOOT-LEFT-2 VIEWS                            PROCEDURE DATE:  04/01/2018          INTERPRETATION:  Radiographs of the left foot         CLINICAL INFORMATION:   Foot pain.    TECHNIQUE:  Frontal, oblique and lateral views of the foot were obtained.    FINDINGS:   No prior similar studies are available for review.    There is degenerative change at the first MTP joint with a screw within   the distal first metatarsal.    The hindfoot appears intact.  No significant spur formation is   recognized.  The soft tissues appear intact.    Atherosclerotic vascular calcifications are noted.    IMPRESSION: No acute injury. Previous surgery of the first metatarsal   with placement of screw and degenerative change at the first MTP joint.

## 2018-04-03 NOTE — PROGRESS NOTE ADULT - SUBJECTIVE AND OBJECTIVE BOX
CC: syncope/fall (28 Mar 2018 17:53)    HPI from ED:  84 yo male with extensive PMH of CAD s/p CABG, chronic a. fib (on coumadin), h/o Mitral valve clipping, HTN, HLD, CKD stage III, Anemia of chronic disease, h/o colonic AVM, DM2, SILVERIO, dementia, hiatal hernia presents to ED s/p multiple falls and syncope. Pt poor historian secondary to dementia and history obtained from family at bedside. Pt presented to ED on 3/27 after episode of syncope as per family and hit the back of his head on the ground. He was found to have a head laceration which was repaired with staples. CT head at the time was negative and pt was discharged home. While patient was walking into his house he had another episode of syncope which lasted a few seconds as per son. He did not hit his head this time. He then had 2 other episodes of near syncope which he felt weak but did not lose consciousness. He went to sleep downstairs on the couch and when the family woke up this morning he was found on the ground. Currently pt resting comfortably and is pleasantly confused. A+Ox2.  Denies any fevers, chills, headaches, dizziness, palpitations, chest pain, SOB, abd pain, N/V, diarrhea. (28 Mar 2018 17:53)    4/3 - Pt. seen and examined with Dr. Forbes in the chair at nursing station. Pt. answers some questions appropriately but he is confused in some of his responses. C/o pain upon palpation to left foot.       REVIEW OF SYSTEMS:  unable to obtain as Pt has dementia     PHYSICAL EXAM:    Vital Signs Last 24 Hrs  T(C): 36.7 (03 Apr 2018 11:46), Max: 36.8 (03 Apr 2018 05:19)  T(F): 98 (03 Apr 2018 11:46), Max: 98.2 (03 Apr 2018 05:19)  HR: 76 (03 Apr 2018 11:46) (61 - 76)  BP: 112/64 (03 Apr 2018 11:46) (112/64 - 141/63)  BP(mean): --  RR: 17 (03 Apr 2018 11:46) (17 - 17)  SpO2: 92% (03 Apr 2018 11:46) (92% - 100%)      General: Well developed; malnourished; in no acute distress  Eyes: PERRLA, EOMI; conjunctiva and sclera clear  Head: Normocephalic; + laceration, repaired, no erythema, no drainage    ENMT: No nasal discharge; airway clear  Neck: Supple; non tender; no masses  Respiratory: Good air entry, No wheezes, rales or rhonchi  Cardiovascular: Regular rate and rhythm. S1 and S2 Normal; No murmurs  Gastrointestinal: Soft non-tender non-distended; Normal bowel sounds  Genitourinary: No costovertebral angle tenderness, Ghosh in place, draining yellow urine with sediment   Extremities: Normal range of motion, No edema  Neurological: Alert and oriented x2, non focal   Skin: Warm and dry. No acute rash. Left 2nd and 3rd toe erythema, edema, tenderness and warmth  Musculoskeletal: Normal muscle  tone, without deformities  Psychiatric: Cooperative and appropriate        Labs & Radiology:                                      9.6    6.63  )-----------( 156      ( 03 Apr 2018 06:24 )             27.8     04-03    140  |  107  |  49<H>  ----------------------------<  192<H>  4.6   |  28  |  1.36<H>    Ca    9.1      03 Apr 2018 06:24      PT/INR - ( 03 Apr 2018 06:24 )   PT: 25.1 sec;   INR: 2.29 ratio           CT Cervical Spine:  IMPRESSION:   No vertebral fracture is recognized.  Grade 1 anterior   spondylolisthesis at C6-7 on a degenerative basis.    Narrowing of the   LEFT C2-3, BILATERAL C3-4, LEFT C4-5, LEFT C5-6 and C6-7 neural foramina   due to uncovertebral spurring and facet osteophytic hypertrophy.     CT Abd/pelvis/chest:      IMPRESSION: No traumatic injury.  Markedly distended urinary bladder.   Renal ultrasound is recommended for further evaluation of the   indeterminate hyperdense left kidney lesion.      CT Head:  IMPRESSION:   Moderate periventricular and deep white matter ischemia is   unchanged.    LEFT parietal scalp hematoma is noted with skin staples.    SHOULDER XRAY:  IMPRESSION:   No prior examinations are available for review.    No fracture or dislocation is identified.  Acromioclavicular joint is   intact. Soft tissues are intact. Median sternotomy.                  MEDICATIONS  (STANDING):  cefTRIAXone Injectable.      cholecalciferol 2000 Unit(s) Oral daily  dextrose 5%. 1000 milliLiter(s) (50 mL/Hr) IV Continuous <Continuous>  dextrose 50% Injectable 12.5 Gram(s) IV Push once  dextrose 50% Injectable 25 Gram(s) IV Push once  dextrose 50% Injectable 25 Gram(s) IV Push once  fludroCORTISONE 0.1 milliGRAM(s) Oral daily  folic acid 1 milliGRAM(s) Oral daily  insulin lispro (HumaLOG) corrective regimen sliding scale   SubCutaneous three times a day before meals  insulin lispro (HumaLOG) corrective regimen sliding scale   SubCutaneous at bedtime  pantoprazole  Injectable 40 milliGRAM(s) IV Push daily  polyethylene glycol 3350 17 Gram(s) Oral daily  simvastatin 40 milliGRAM(s) Oral at bedtime  sodium chloride 0.9%. 1000 milliLiter(s) (50 mL/Hr) IV Continuous <Continuous>  sodium chloride 0.9%. 1000 milliLiter(s) (50 mL/Hr) IV Continuous <Continuous>  sodium ferric gluconate complex IVPB 125 milliGRAM(s) IV Intermittent daily  tamsulosin 0.4 milliGRAM(s) Oral at bedtime  vancomycin  IVPB 500 milliGRAM(s) IV Intermittent every 12 hours  vancomycin  IVPB      vancomycin  IVPB 500 milliGRAM(s) IV Intermittent once    MEDICATIONS  (PRN):  acetaminophen   Tablet. 650 milliGRAM(s) Oral every 6 hours PRN Mild Pain (1 - 3)  dextrose Gel 1 Dose(s) Oral once PRN Blood Glucose LESS THAN 70 milliGRAM(s)/deciliter  docusate sodium 100 milliGRAM(s) Oral two times a day PRN Constipation  glucagon  Injectable 1 milliGRAM(s) IntraMuscular once PRN Glucose LESS THAN 70 milligrams/deciliter  senna 2 Tablet(s) Oral at bedtime PRN Constipation              HOSPITAL COURSE BY PROBLEM:  ASSESSMENT & PLAN:    # Multiple falls with syncope and near syncope  - likely Orthostatic due to dehydration and hypovolemia   - Orthostatic VS improved after IVF and started on Florinef   - no events on tele, off tele now  - CT head neg   - CT head negative  - trop neg x 3  - echo results noted, valves stable, EF normal   - completed IVF, monitor off, encourage oral hydration  - PT       # EM on CKD stage III with Azotemia   - likely dehydration and obstruction   - CR down to normal with IVF, but start trending up on oral hydration   - hold torsemide (as per son dose was increased in past few weeks)   - watch off IV fluids  - Repeat CT: ghosh  not in place, repositioned by nursing staff,  draining well now. L mild hydro? Monitor if renal Fx worse might need follow up renal sono  - Renal Sono with L complex cyst.   - Monitor UO and Renal Fx   - attempt trial void today 4/3    # Acute on Chronic Anemia of blood loss?   - h/o AVM GI bleed,  and as per family bled significantly from laceration  - baseline hgb about 12, possibly has dilutional component  - S/p 1U PRBcs   - No signs of acute bleeding now   - IN EMR had EGD 3/7 with gastric erosions  and bleeding stigmata?   - CT Chest/abd/pelvis neg for bleeding on admission and repeat CT on 4/1   - B12, folate WNL  - stool occult blood neg   - Iron panel: Iron deficiency and inflammation   - Retic count appropriate   - Monitor H/H closely  - C/w PPI  - c/w IV Iron   - hold Coumadin    # Chronic A. fib. SupraTx INR   - rate controlled  - INR daily,  2.29 today  - hold coumadin, discuss with family about restarting due to risks vs benefits    # Cellulitis vs Acute trauma at second digit, left foot  - ID consult appreciated ---> -start vancomycin 500 mg IV q12h and ceftriaxone 1 gm IV qd  - podiatry consult appreciated ---> Applied betadine soaked gauze/carmen tariq splint to 2nd and 3rd digit, left foot. Dispensed surgical shoe and instructed pt to ambulate with surgical shoe to the left foot. Dispensed Z-flex offloading boot b/l to offload the heels.      # Dementia with possible Depression  - supportive care   - Psych eval appreciated  - added Lexapro 5mg QD    # DM2  - HgbA1c = 7.3%  - hold januvia  - ISS, finger sticks    # HLD  - continue statin    # DVT prophylaxis  - on coumadin    # Dispo  - plan for rehab vs home with 24 hour aides CC: syncope/fall (28 Mar 2018 17:53)    HPI from ED:  84 yo male with extensive PMH of CAD s/p CABG, chronic a. fib (on coumadin), h/o Mitral valve clipping, HTN, HLD, CKD stage III, Anemia of chronic disease, h/o colonic AVM, DM2, SILVERIO, dementia, hiatal hernia presents to ED s/p multiple falls and syncope. Pt poor historian secondary to dementia and history obtained from family at bedside. Pt presented to ED on 3/27 after episode of syncope as per family and hit the back of his head on the ground. He was found to have a head laceration which was repaired with staples. CT head at the time was negative and pt was discharged home. While patient was walking into his house he had another episode of syncope which lasted a few seconds as per son. He did not hit his head this time. He then had 2 other episodes of near syncope which he felt weak but did not lose consciousness. He went to sleep downstairs on the couch and when the family woke up this morning he was found on the ground. Currently pt resting comfortably and is pleasantly confused. A+Ox2.  Denies any fevers, chills, headaches, dizziness, palpitations, chest pain, SOB, abd pain, N/V, diarrhea. (28 Mar 2018 17:53)    4/3 - Pt. seen and examined with Dr. Forbes in the chair at nursing station. Pt. answers some questions appropriately but he is confused in some of his responses. C/o pain upon palpation to left foot.       REVIEW OF SYSTEMS:  unable to obtain as Pt has dementia     PHYSICAL EXAM:    Vital Signs Last 24 Hrs  T(C): 36.7 (03 Apr 2018 11:46), Max: 36.8 (03 Apr 2018 05:19)  T(F): 98 (03 Apr 2018 11:46), Max: 98.2 (03 Apr 2018 05:19)  HR: 76 (03 Apr 2018 11:46) (61 - 76)  BP: 112/64 (03 Apr 2018 11:46) (112/64 - 141/63)  BP(mean): --  RR: 17 (03 Apr 2018 11:46) (17 - 17)  SpO2: 92% (03 Apr 2018 11:46) (92% - 100%)      General: Well developed; malnourished; in no acute distress  Eyes: PERRLA, EOMI; conjunctiva and sclera clear  Head: Normocephalic; + laceration, repaired, no erythema, no drainage    ENMT: No nasal discharge; airway clear  Neck: Supple; non tender; no masses  Respiratory: Good air entry, No wheezes, rales or rhonchi  Cardiovascular: Regular rate and rhythm. S1 and S2 Normal; No murmurs  Gastrointestinal: Soft non-tender non-distended; Normal bowel sounds  Genitourinary: No costovertebral angle tenderness, Ghosh in place, draining yellow urine with sediment   Extremities: Normal range of motion, No edema  Neurological: Alert and oriented x2, non focal   Skin: Warm and dry. No acute rash. Left 2nd and 3rd toe erythema, edema, tenderness and warmth  Musculoskeletal: Normal muscle  tone, without deformities  Psychiatric: Cooperative and appropriate        Labs & Radiology:                                      9.6    6.63  )-----------( 156      ( 03 Apr 2018 06:24 )             27.8     04-03    140  |  107  |  49<H>  ----------------------------<  192<H>  4.6   |  28  |  1.36<H>    Ca    9.1      03 Apr 2018 06:24      PT/INR - ( 03 Apr 2018 06:24 )   PT: 25.1 sec;   INR: 2.29 ratio           CT Cervical Spine:  IMPRESSION:   No vertebral fracture is recognized.  Grade 1 anterior   spondylolisthesis at C6-7 on a degenerative basis.    Narrowing of the   LEFT C2-3, BILATERAL C3-4, LEFT C4-5, LEFT C5-6 and C6-7 neural foramina   due to uncovertebral spurring and facet osteophytic hypertrophy.     CT Abd/pelvis/chest:      IMPRESSION: No traumatic injury.  Markedly distended urinary bladder.   Renal ultrasound is recommended for further evaluation of the   indeterminate hyperdense left kidney lesion.      CT Head:  IMPRESSION:   Moderate periventricular and deep white matter ischemia is   unchanged.    LEFT parietal scalp hematoma is noted with skin staples.    SHOULDER XRAY:  IMPRESSION:   No prior examinations are available for review.    No fracture or dislocation is identified.  Acromioclavicular joint is   intact. Soft tissues are intact. Median sternotomy.                  MEDICATIONS  (STANDING):  cefTRIAXone Injectable.      cholecalciferol 2000 Unit(s) Oral daily  dextrose 5%. 1000 milliLiter(s) (50 mL/Hr) IV Continuous <Continuous>  dextrose 50% Injectable 12.5 Gram(s) IV Push once  dextrose 50% Injectable 25 Gram(s) IV Push once  dextrose 50% Injectable 25 Gram(s) IV Push once  fludroCORTISONE 0.1 milliGRAM(s) Oral daily  folic acid 1 milliGRAM(s) Oral daily  insulin lispro (HumaLOG) corrective regimen sliding scale   SubCutaneous three times a day before meals  insulin lispro (HumaLOG) corrective regimen sliding scale   SubCutaneous at bedtime  pantoprazole  Injectable 40 milliGRAM(s) IV Push daily  polyethylene glycol 3350 17 Gram(s) Oral daily  simvastatin 40 milliGRAM(s) Oral at bedtime  sodium chloride 0.9%. 1000 milliLiter(s) (50 mL/Hr) IV Continuous <Continuous>  sodium chloride 0.9%. 1000 milliLiter(s) (50 mL/Hr) IV Continuous <Continuous>  sodium ferric gluconate complex IVPB 125 milliGRAM(s) IV Intermittent daily  tamsulosin 0.4 milliGRAM(s) Oral at bedtime  vancomycin  IVPB 500 milliGRAM(s) IV Intermittent every 12 hours  vancomycin  IVPB      vancomycin  IVPB 500 milliGRAM(s) IV Intermittent once    MEDICATIONS  (PRN):  acetaminophen   Tablet. 650 milliGRAM(s) Oral every 6 hours PRN Mild Pain (1 - 3)  dextrose Gel 1 Dose(s) Oral once PRN Blood Glucose LESS THAN 70 milliGRAM(s)/deciliter  docusate sodium 100 milliGRAM(s) Oral two times a day PRN Constipation  glucagon  Injectable 1 milliGRAM(s) IntraMuscular once PRN Glucose LESS THAN 70 milligrams/deciliter  senna 2 Tablet(s) Oral at bedtime PRN Constipation              HOSPITAL COURSE BY PROBLEM:  ASSESSMENT & PLAN:    # Multiple falls with syncope and near syncope  - likely Orthostatic due to dehydration and hypovolemia   - Orthostatic VS improved after IVF and started on Florinef   - no events on tele, off tele now  - CT head neg   - CT head negative  - trop neg x 3  - echo results noted, valves stable, EF normal   - completed IVF, monitor off, encourage oral hydration  - PT       # EM on CKD stage III with Azotemia   - likely dehydration and obstruction   - CR down to normal with IVF, but start trending up on oral hydration   - hold torsemide (as per son dose was increased in past few weeks)   - watch off IV fluids  - Repeat CT: ghosh  not in place, repositioned by nursing staff,  draining well now. L mild hydro? Monitor if renal Fx worse might need follow up renal sono  - Renal Sono with L complex cyst.   - Monitor UO and Renal Fx   - attempt trial void today 4/3    # Acute on Chronic Anemia of blood loss?   - h/o AVM GI bleed,  and as per family bled significantly from laceration  - baseline hgb about 12, possibly has dilutional component  - S/p 1U PRBcs   - No signs of acute bleeding now   - IN EMR had EGD 3/7 with gastric erosions  and bleeding stigmata?   - CT Chest/abd/pelvis neg for bleeding on admission and repeat CT on 4/1   - B12, folate WNL  - stool occult blood neg   - Iron panel: Iron deficiency and inflammation   - Retic count appropriate   - Monitor H/H closely  - C/w PPI  - c/w IV Iron   - hold Coumadin    # Chronic A. fib. SupraTx INR   - rate controlled  - INR daily,  2.29 today  - hold coumadin, discuss with family about restarting due to risks vs benefits    # Cellulitis vs Acute trauma at second digit, left foot  - ID consult appreciated ---> -start vancomycin 500 mg IV q12h and ceftriaxone 1 gm IV qd  - podiatry consult appreciated ---> Applied betadine soaked gauze/carmen tariq splint to 2nd and 3rd digit, left foot. Dispensed surgical shoe and instructed pt to ambulate with surgical shoe to the left foot. Dispensed Z-flex offloading boot b/l to offload the heels.  - xray left foot IMPRESSION: No acute injury. Previous surgery of the first metatarsal with placement of screw and degenerative change at the first MTP joint.      # Dementia with possible Depression  - supportive care   - Psych eval appreciated  - added Lexapro 5mg QD    # DM2  - HgbA1c = 7.3%  - hold januvia  - ISS, finger sticks    # HLD  - continue statin    # DVT prophylaxis  - on coumadin    # Dispo  - plan for rehab vs home with 24 hour aides CC: syncope/fall (28 Mar 2018 17:53)    HPI from ED:  86 yo male with extensive PMH of CAD s/p CABG, chronic a. fib (on coumadin), h/o Mitral valve clipping, HTN, HLD, CKD stage III, Anemia of chronic disease, h/o colonic AVM, DM2, SILVERIO, dementia, hiatal hernia presents to ED s/p multiple falls and syncope. Pt poor historian secondary to dementia and history obtained from family at bedside. Pt presented to ED on 3/27 after episode of syncope as per family and hit the back of his head on the ground. He was found to have a head laceration which was repaired with staples. CT head at the time was negative and pt was discharged home. While patient was walking into his house he had another episode of syncope which lasted a few seconds as per son. He did not hit his head this time. He then had 2 other episodes of near syncope which he felt weak but did not lose consciousness. He went to sleep downstairs on the couch and when the family woke up this morning he was found on the ground. Currently pt resting comfortably and is pleasantly confused. A+Ox2.  Denies any fevers, chills, headaches, dizziness, palpitations, chest pain, SOB, abd pain, N/V, diarrhea. (28 Mar 2018 17:53)    4/3 - Pt. seen and examined with Dr. Forbes in the chair at nursing station. Pt. answers some questions appropriately but he is confused in some of his responses. C/o pain upon palpation to left foot.       REVIEW OF SYSTEMS:  unable to obtain as Pt has dementia     PHYSICAL EXAM:    Vital Signs Last 24 Hrs  T(C): 36.7 (03 Apr 2018 11:46), Max: 36.8 (03 Apr 2018 05:19)  T(F): 98 (03 Apr 2018 11:46), Max: 98.2 (03 Apr 2018 05:19)  HR: 76 (03 Apr 2018 11:46) (61 - 76)  BP: 112/64 (03 Apr 2018 11:46) (112/64 - 141/63)  BP(mean): --  RR: 17 (03 Apr 2018 11:46) (17 - 17)  SpO2: 92% (03 Apr 2018 11:46) (92% - 100%)      General: Well developed; malnourished; in no acute distress  Eyes: PERRLA, EOMI; conjunctiva and sclera clear  Head: Normocephalic; + laceration, repaired, no erythema, no drainage    ENMT: No nasal discharge; airway clear  Neck: Supple; non tender; no masses  Respiratory: Good air entry, No wheezes, rales or rhonchi  Cardiovascular: Regular rate and rhythm. S1 and S2 Normal; No murmurs  Gastrointestinal: Soft non-tender non-distended; Normal bowel sounds  Genitourinary: No costovertebral angle tenderness, Ghosh in place, draining yellow urine with sediment   Extremities: Normal range of motion, No edema  Neurological: Alert and oriented x2, non focal   Skin: Warm and dry. No acute rash. Left 2nd and 3rd toe erythema, edema, tenderness and warmth  Musculoskeletal: Normal muscle  tone, without deformities  Psychiatric: Cooperative and appropriate        Labs & Radiology:                                      9.6    6.63  )-----------( 156      ( 03 Apr 2018 06:24 )             27.8     04-03    140  |  107  |  49<H>  ----------------------------<  192<H>  4.6   |  28  |  1.36<H>    Ca    9.1      03 Apr 2018 06:24      PT/INR - ( 03 Apr 2018 06:24 )   PT: 25.1 sec;   INR: 2.29 ratio           CT Cervical Spine:  IMPRESSION:   No vertebral fracture is recognized.  Grade 1 anterior   spondylolisthesis at C6-7 on a degenerative basis.    Narrowing of the   LEFT C2-3, BILATERAL C3-4, LEFT C4-5, LEFT C5-6 and C6-7 neural foramina   due to uncovertebral spurring and facet osteophytic hypertrophy.     CT Abd/pelvis/chest:      IMPRESSION: No traumatic injury.  Markedly distended urinary bladder.   Renal ultrasound is recommended for further evaluation of the   indeterminate hyperdense left kidney lesion.      CT Head:  IMPRESSION:   Moderate periventricular and deep white matter ischemia is   unchanged.    LEFT parietal scalp hematoma is noted with skin staples.    SHOULDER XRAY:  IMPRESSION:   No prior examinations are available for review.    No fracture or dislocation is identified.  Acromioclavicular joint is   intact. Soft tissues are intact. Median sternotomy.                  MEDICATIONS  (STANDING):  cefTRIAXone Injectable.      cholecalciferol 2000 Unit(s) Oral daily  dextrose 5%. 1000 milliLiter(s) (50 mL/Hr) IV Continuous <Continuous>  dextrose 50% Injectable 12.5 Gram(s) IV Push once  dextrose 50% Injectable 25 Gram(s) IV Push once  dextrose 50% Injectable 25 Gram(s) IV Push once  fludroCORTISONE 0.1 milliGRAM(s) Oral daily  folic acid 1 milliGRAM(s) Oral daily  insulin lispro (HumaLOG) corrective regimen sliding scale   SubCutaneous three times a day before meals  insulin lispro (HumaLOG) corrective regimen sliding scale   SubCutaneous at bedtime  pantoprazole  Injectable 40 milliGRAM(s) IV Push daily  polyethylene glycol 3350 17 Gram(s) Oral daily  simvastatin 40 milliGRAM(s) Oral at bedtime  sodium chloride 0.9%. 1000 milliLiter(s) (50 mL/Hr) IV Continuous <Continuous>  sodium chloride 0.9%. 1000 milliLiter(s) (50 mL/Hr) IV Continuous <Continuous>  sodium ferric gluconate complex IVPB 125 milliGRAM(s) IV Intermittent daily  tamsulosin 0.4 milliGRAM(s) Oral at bedtime  vancomycin  IVPB 500 milliGRAM(s) IV Intermittent every 12 hours  vancomycin  IVPB      vancomycin  IVPB 500 milliGRAM(s) IV Intermittent once       CT Head No Cont (04.02.18 @ 15:05) >  IMPRESSION:    moderate periventricular and deep white matter ischemia     Xray Foot AP + Lateral, Left (04.01.18 @ 15:55)   IMPRESSION: No acute injury. Previous surgery of the first metatarsal   with placement of screw and degenerative change at the first MTP joint.      HOSPITAL COURSE BY PROBLEM:  ASSESSMENT & PLAN:    # Multiple falls with syncope and near syncope  - likely Orthostatic due to dehydration and hypovolemia   - Orthostatic VS improved after IVF and started on Florinef   - no events on tele, off tele now  - CT head neg   - CT head negative  - trop neg x 3  - echo results noted, valves stable, EF normal   - completed IVF, monitor off, encourage oral hydration  - PT       # EM on CKD stage III with Azotemia   - likely dehydration and obstruction   - CR down to normal with IVF, but start trending up on oral hydration   - hold torsemide (as per son dose was increased in past few weeks)   - watch off IV fluids  - Repeat CT: ghosh  not in place, repositioned by nursing staff,  draining well now. L mild hydro? Monitor if renal Fx worse might need follow up renal sono  - Renal Sono with L complex cyst.   - Monitor UO and Renal Fx   - attempt trial void today 4/3    # Acute on Chronic Anemia of blood loss?   - h/o AVM GI bleed,  and as per family bled significantly from laceration  - baseline hgb about 12, possibly has dilutional component  - S/p 1U PRBcs   - No signs of acute bleeding now   - IN EMR had EGD 3/7 with gastric erosions  and bleeding stigmata?   - CT Chest/abd/pelvis neg for bleeding on admission and repeat CT on 4/1   - B12, folate WNL  - stool occult blood neg   - Iron panel: Iron deficiency and inflammation   - Retic count appropriate   - Monitor H/H closely  - C/w PPI  - c/w IV Iron   - hold Coumadin    # Chronic A. fib. SupraTx INR   - rate controlled  - INR daily,  2.29 today  - hold coumadin, discuss with family about restarting due to risks vs benefits    # Cellulitis vs Acute trauma at second digit, left foot  - ID consult appreciated ---> -start vancomycin 500 mg IV q12h and ceftriaxone 1 gm IV qd  - podiatry consult appreciated ---> Applied betadine soaked gauze/carmen tariq splint to 2nd and 3rd digit, left foot. Dispensed surgical shoe and instructed pt to ambulate with surgical shoe to the left foot. Dispensed Z-flex offloading boot b/l to offload the heels.  - xray left foot IMPRESSION: No acute injury. Previous surgery of the first metatarsal with placement of screw and degenerative change at the first MTP joint.    # Dementia with possible Depression  - supportive care   - Psych eval appreciated  - added Lexapro 5mg QD    # DM2  - HgbA1c = 7.3%  - hold januvia  - ISS, finger sticks    # HLD- continue statin    # DVT prophylaxis: off Coumadin, add Venodynes    # Dispo- plan for rehab vs home with 24 hour aides in next 2 days.

## 2018-04-03 NOTE — CONSULT NOTE ADULT - SUBJECTIVE AND OBJECTIVE BOX
Patient is a 85y old  Male who presents with a chief complaint of syncope/fall (28 Mar 2018 17:53)    HPI:  86 y/o male with h/o CAD s/p CABG, chronic A. fib (on coumadin), prior mitral valve clipping, HTN, HLD, CKD stage III, anemia of chronic disease, colonic AVM, DM2, SILVERIO, dementia, hiatal hernia was admitted on 3/28 for increased weakness s/p multiple falls and syncope. Pt had a recent syncopal episode on 3/27 for which she was evaluated in ER. Son reported another episode of syncope which lasted a few seconds. He then had 2 other episodes of near syncope which he felt weak but did not lose consciousness. On the evening PTA, the patient went to sleep downstairs on the couch and when the family woke up this morning he was found on the ground. No fever or chills reported at home.    He was noted with left 2nd toe pain, redness and swelling.    PMH: as above  Pt presented to ED on 3/27 after episode of syncope as per family and hit the back of his head on the ground. He was found to have a head laceration which was repaired with staples. CT head at the time was negative and pt was discharged home.  PSH: as above  Meds: per reconciliation sheet, noted below  MEDICATIONS  (STANDING):  cholecalciferol 2000 Unit(s) Oral daily  dextrose 5%. 1000 milliLiter(s) (50 mL/Hr) IV Continuous <Continuous>  dextrose 50% Injectable 12.5 Gram(s) IV Push once  dextrose 50% Injectable 25 Gram(s) IV Push once  dextrose 50% Injectable 25 Gram(s) IV Push once  fludroCORTISONE 0.1 milliGRAM(s) Oral daily  folic acid 1 milliGRAM(s) Oral daily  insulin lispro (HumaLOG) corrective regimen sliding scale   SubCutaneous three times a day before meals  insulin lispro (HumaLOG) corrective regimen sliding scale   SubCutaneous at bedtime  pantoprazole  Injectable 40 milliGRAM(s) IV Push daily  polyethylene glycol 3350 17 Gram(s) Oral daily  simvastatin 40 milliGRAM(s) Oral at bedtime  sodium chloride 0.9%. 1000 milliLiter(s) (50 mL/Hr) IV Continuous <Continuous>  sodium chloride 0.9%. 1000 milliLiter(s) (50 mL/Hr) IV Continuous <Continuous>  sodium ferric gluconate complex IVPB 125 milliGRAM(s) IV Intermittent daily    MEDICATIONS  (PRN):  acetaminophen   Tablet. 650 milliGRAM(s) Oral every 6 hours PRN Mild Pain (1 - 3)  dextrose Gel 1 Dose(s) Oral once PRN Blood Glucose LESS THAN 70 milliGRAM(s)/deciliter  docusate sodium 100 milliGRAM(s) Oral two times a day PRN Constipation  glucagon  Injectable 1 milliGRAM(s) IntraMuscular once PRN Glucose LESS THAN 70 milligrams/deciliter  senna 2 Tablet(s) Oral at bedtime PRN Constipation    Allergies    No Known Allergies    Intolerances      Social: no smoking, no alcohol, no illegal drugs; no recent travel, no exposure to TB  FAMILY HISTORY:  Family history of myocardial infarction (Father)    ROS: the patient denies fever, no chills, no HA, no dizziness, no sore throat, no blurry vision, no CP, no palpitations, no SOB, no cough, no abdominal pain, no diarrhea, no N/V, no dysuria, no leg pain, no claudication, no rash, no joint aches, no rectal pain or bleeding, no night sweats    Vital Signs Last 24 Hrs  T(C): 36.8 (03 Apr 2018 05:19), Max: 36.8 (03 Apr 2018 05:19)  T(F): 98.2 (03 Apr 2018 05:19), Max: 98.2 (03 Apr 2018 05:19)  HR: 61 (03 Apr 2018 05:19) (61 - 69)  BP: 138/70 (03 Apr 2018 05:19) (120/52 - 141/63)  BP(mean): --  RR: 17 (03 Apr 2018 05:19) (17 - 17)  SpO2: 100% (03 Apr 2018 05:19) (94% - 100%)  Daily     Daily     PE:    Constitutional: frail looking  HEENT: NC/AT, EOMI, PERRLA  Neck: supple  Back: no tenderness  Respiratory: clear  Cardiovascular: S1S2 regular, no murmurs  Abdomen: soft, not tender, not distended, positive BS  Genitourinary: no LN palpable  Rectal: deferred  Musculoskeletal: no muscle tenderness, no joint swelling or tenderness  Extremities: no pedal edema  Neurological: AxOx3, moving all extremities  Skin: no rashes    Labs:                        9.6    6.63  )-----------( 156      ( 03 Apr 2018 06:24 )             27.8     04-03    140  |  107  |  49<H>  ----------------------------<  192<H>  4.6   |  28  |  1.36<H>    Ca    9.1      03 Apr 2018 06:24               Radiology:    Advanced directives addressed: full resuscitation Patient is a 85y old  Male who presents with a chief complaint of syncope/fall (28 Mar 2018 17:53)    HPI:  86 y/o male with h/o CAD s/p CABG, chronic A. fib (on coumadin), prior mitral valve clipping, HTN, HLD, CKD stage III, anemia of chronic disease, colonic AVM, DM2, SILVERIO, dementia, hiatal hernia was admitted on 3/28 for increased weakness s/p multiple falls and syncope. Pt had a recent syncopal episode on 3/27 for which she was evaluated in ER. Son reported another episode of syncope which lasted a few seconds. He then had 2 other episodes of near syncope which he felt weak but did not lose consciousness. On the evening PTA, the patient went to sleep downstairs on the couch and when the family woke up this morning he was found on the ground. No fever or chills reported at home.    He was noted with left 2nd toe pain, redness and swelling.    PMH: as above  Pt presented to ED on 3/27 after episode of syncope as per family and hit the back of his head on the ground. He was found to have a head laceration which was repaired with staples. CT head at the time was negative and pt was discharged home.  PSH: as above  Meds: per reconciliation sheet, noted below  MEDICATIONS  (STANDING):  cholecalciferol 2000 Unit(s) Oral daily  dextrose 5%. 1000 milliLiter(s) (50 mL/Hr) IV Continuous <Continuous>  dextrose 50% Injectable 12.5 Gram(s) IV Push once  dextrose 50% Injectable 25 Gram(s) IV Push once  dextrose 50% Injectable 25 Gram(s) IV Push once  fludroCORTISONE 0.1 milliGRAM(s) Oral daily  folic acid 1 milliGRAM(s) Oral daily  insulin lispro (HumaLOG) corrective regimen sliding scale   SubCutaneous three times a day before meals  insulin lispro (HumaLOG) corrective regimen sliding scale   SubCutaneous at bedtime  pantoprazole  Injectable 40 milliGRAM(s) IV Push daily  polyethylene glycol 3350 17 Gram(s) Oral daily  simvastatin 40 milliGRAM(s) Oral at bedtime  sodium chloride 0.9%. 1000 milliLiter(s) (50 mL/Hr) IV Continuous <Continuous>  sodium chloride 0.9%. 1000 milliLiter(s) (50 mL/Hr) IV Continuous <Continuous>  sodium ferric gluconate complex IVPB 125 milliGRAM(s) IV Intermittent daily    MEDICATIONS  (PRN):  acetaminophen   Tablet. 650 milliGRAM(s) Oral every 6 hours PRN Mild Pain (1 - 3)  dextrose Gel 1 Dose(s) Oral once PRN Blood Glucose LESS THAN 70 milliGRAM(s)/deciliter  docusate sodium 100 milliGRAM(s) Oral two times a day PRN Constipation  glucagon  Injectable 1 milliGRAM(s) IntraMuscular once PRN Glucose LESS THAN 70 milligrams/deciliter  senna 2 Tablet(s) Oral at bedtime PRN Constipation    Allergies    No Known Allergies    Intolerances      Social: no smoking, no alcohol, no illegal drugs; no recent travel, no exposure to TB  FAMILY HISTORY:  Family history of myocardial infarction (Father)    ROS: the patient is confused; unobtainable    Vital Signs Last 24 Hrs  T(C): 36.8 (03 Apr 2018 05:19), Max: 36.8 (03 Apr 2018 05:19)  T(F): 98.2 (03 Apr 2018 05:19), Max: 98.2 (03 Apr 2018 05:19)  HR: 61 (03 Apr 2018 05:19) (61 - 69)  BP: 138/70 (03 Apr 2018 05:19) (120/52 - 141/63)  BP(mean): --  RR: 17 (03 Apr 2018 05:19) (17 - 17)  SpO2: 100% (03 Apr 2018 05:19) (94% - 100%)  Daily     Daily     PE:    Constitutional: frail looking  HEENT: NC/AT, EOMI, PERRLA  Neck: supple  Back: no tenderness  Respiratory: clear  Cardiovascular: S1S2 regular, no murmurs  Abdomen: soft, not tender, not distended, positive BS  Genitourinary: no LN palpable  Musculoskeletal: no muscle tenderness, no joint swelling or tenderness  Extremities: no pedal edema; left 2nd and 3rd toe erythema, edema, tenderness and warmths; left 3nd toe dorsal aspect open wound; erythema extending to left forefoot  Neurological: confused, moving all extremities  Skin: no rashes    Labs:                        9.6    6.63  )-----------( 156      ( 03 Apr 2018 06:24 )             27.8     04-03    140  |  107  |  49<H>  ----------------------------<  192<H>  4.6   |  28  |  1.36<H>    Ca    9.1      03 Apr 2018 06:24      Culture - Urine (collected 28 Mar 2018 14:42)  Source: .Urine None  Final Report (29 Mar 2018 23:11):    No growth        Radiology:    < from: Xray Foot AP + Lateral, Left (04.01.18 @ 15:55) >  No acute injury. Previous surgery of the first metatarsal   with placement of screw and degenerative change at the first MTP joint.    < end of copied text >    < from: CT Abdomen and Pelvis No Cont (04.01.18 @ 14:41) >  No retroperitoneal hemorrhage.  Osborne catheter inflated within the apex of the prostate gland.  Thickened urinary bladder with surrounding inflammatory changes,   correlate for cystitis, new from 3/28.  Otherwise no acute findings provided the limitation of a noncontrast exam.    < end of copied text >      Advanced directives addressed: full resuscitation

## 2018-04-03 NOTE — PROGRESS NOTE ADULT - SUBJECTIVE AND OBJECTIVE BOX
Cardiology Progress Note    HPI: 86 yo male with extensive PMH of CAD s/p CABG, chronic a. fib (on coumadin), h/o Mitral valve clipping, HTN, HLD, CKD stage III, Anemia of chronic disease, h/o colonic AVM, DM2, SILVERIO, dementia, hiatal hernia presents to ED s/p multiple falls and syncope. Pt poor historian secondary to dementia and history obtained from family at bedside. Pt presented to ED on 3/27 after episode of syncope as per family and hit the back of his head on the ground. He was found to have a head laceration which was repaired with staples. CT head at the time was negative and pt was discharged home. While patient was walking into his house he had another episode of syncope which lasted a few seconds as per son. He did not hit his head this time. He then had 2 other episodes of near syncope which he felt weak but did not lose consciousness. He went to sleep downstairs on the couch and when the family woke up this morning he was found on the ground. Currently pt resting comfortably and is pleasantly confused. A+Ox2.  Denies any fevers, chills, headaches, dizziness, palpitations, chest pain, SOB, abd pain, N/V, diarrhea. (28 Mar 2018 17:53)    4/3- Confused. No CP/SOB. No new complaints.     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  Transfusion history  Anemia with chronic illness  SILVERIO (obstructive sleep apnea): on CPAP  TIA (transient ischemic attack)  CKD (chronic kidney disease)  MR (mitral regurgitation)  Memory loss, short term  Hiatal hernia  DM (diabetes mellitus screen)  HTN (hypertension)  HLD (hyperlipidemia)  BPH (benign prostatic hyperplasia)  Chronic atrial fibrillation  History of tonsillectomy  H/O heart surgery: Clip  H/O cystoscopy: TURP  History of hernia surgery  S/P CABG x 4    Allergies  No Known Allergies    Intolerances    SOCIAL HISTORY: Denies tobacco, etoh abuse or illicit drug use    FAMILY HISTORY: Family history of myocardial infarction (Father)    MEDICATIONS  (STANDING):  cholecalciferol 2000 Unit(s) Oral daily  dextrose 5%. 1000 milliLiter(s) (50 mL/Hr) IV Continuous <Continuous>  dextrose 50% Injectable 12.5 Gram(s) IV Push once  dextrose 50% Injectable 25 Gram(s) IV Push once  dextrose 50% Injectable 25 Gram(s) IV Push once  fludroCORTISONE 0.1 milliGRAM(s) Oral daily  folic acid 1 milliGRAM(s) Oral daily  insulin lispro (HumaLOG) corrective regimen sliding scale   SubCutaneous three times a day before meals  insulin lispro (HumaLOG) corrective regimen sliding scale   SubCutaneous at bedtime  pantoprazole  Injectable 40 milliGRAM(s) IV Push daily  polyethylene glycol 3350 17 Gram(s) Oral daily  simvastatin 40 milliGRAM(s) Oral at bedtime  sodium chloride 0.9%. 1000 milliLiter(s) (50 mL/Hr) IV Continuous <Continuous>  sodium chloride 0.9%. 1000 milliLiter(s) (50 mL/Hr) IV Continuous <Continuous>  sodium ferric gluconate complex IVPB 125 milliGRAM(s) IV Intermittent daily    MEDICATIONS  (PRN):  acetaminophen   Tablet. 650 milliGRAM(s) Oral every 6 hours PRN Mild Pain (1 - 3)  dextrose Gel 1 Dose(s) Oral once PRN Blood Glucose LESS THAN 70 milliGRAM(s)/deciliter  docusate sodium 100 milliGRAM(s) Oral two times a day PRN Constipation  glucagon  Injectable 1 milliGRAM(s) IntraMuscular once PRN Glucose LESS THAN 70 milligrams/deciliter  senna 2 Tablet(s) Oral at bedtime PRN Constipation      Vital Signs Last 24 Hrs  T(C): 36.8 (03 Apr 2018 05:19), Max: 36.8 (03 Apr 2018 05:19)  T(F): 98.2 (03 Apr 2018 05:19), Max: 98.2 (03 Apr 2018 05:19)  HR: 61 (03 Apr 2018 05:19) (61 - 69)  BP: 138/70 (03 Apr 2018 05:19) (120/52 - 141/63)  BP(mean): --  RR: 17 (03 Apr 2018 05:19) (17 - 17)  SpO2: 100% (03 Apr 2018 05:19) (94% - 100%)    REVIEW OF SYSTEMS:    CONSTITUTIONAL:  As per HPI.  HEENT:  Eyes:  No diplopia or blurred vision. ENT:  No earache, sore throat or runny nose.  CARDIOVASCULAR:  No pressure, squeezing, strangling, tightness, heaviness or aching about the chest, neck, axilla or epigastrium.  RESPIRATORY:  No cough, shortness of breath, PND or orthopnea.  GASTROINTESTINAL:  No nausea, vomiting or diarrhea.  GENITOURINARY:  No dysuria, frequency or urgency.  MUSCULOSKELETAL:  As per HPI.  NEUROLOGIC:  No paresthesias, fasciculations, seizures or weakness.    PHYSICAL EXAMINATION:    GENERAL APPEARANCE:  Pt. is not currently dyspneic, in no distress. Pt. is alert, oriented, and pleasant.  HEENT:  Pupils are normal and react normally. No icterus. Mucous membranes well colored.  NECK:  Supple. No lymphadenopathy. Jugular venous pressure not elevated. Carotids equal.   HEART:   The cardiac impulse has a normal quality. There are no murmurs, rubs or gallops noted  CHEST:  Chest is clear to auscultation. Normal respiratory effort.  ABDOMEN:  Soft and nontender.   EXTREMITIES:  There is no edema.     I&O's Summary    02 Apr 2018 07:01  -  03 Apr 2018 07:00  --------------------------------------------------------  IN: 210 mL / OUT: 850 mL / NET: -640 mL    LABS:                        9.6    6.63  )-----------( 156      ( 03 Apr 2018 06:24 )             27.8     04-03    140  |  107  |  49<H>  ----------------------------<  192<H>  4.6   |  28  |  1.36<H>    Ca    9.1      03 Apr 2018 06:24    PT/INR - ( 03 Apr 2018 06:24 )   PT: 25.1 sec;   INR: 2.29 ratio      EKG: < from: 12 Lead ECG (03.29.18 @ 07:42) >  Atrial fibrillation with slow ventricular response with premature ventricular or aberrantly conducted complexes  Left axis deviation  Right bundle branch block  Abnormal ECG    < end of copied text >    TELEMETRY: Not on tele.    CARDIAC TESTS: < from: Transthoracic Echocardiogram (03.29.18 @ 09:13) >  Fibrocalcificchanges noted to the Aortic valve leaflets with preserved   leaflet excursion.   Trace aortic regurgitation is present.     The left atrium is moderately dilated.     Mild concentric left ventricular hypertrophy is present.   Estimated left ventricular ejection fraction is 65-70 %.     Fibrocalcific changes noted to the mitral valve leaflets with preserved   leaflet excursion.   Cannot rule out mitral valve clip.   At least moderate (2+) eccentric mitral regurgitation is present.     Normal appearing pulmonic valve structure and function.   The right atrium appears mildly dilated.   A device wire is seen in the RV and RA.   Mild to Moderate Tricuspid regurgitation is present.      < end of copied text >      RADIOLOGY & ADDITIONAL STUDIES: < from: CT Head No Cont (04.02.18 @ 15:05) >    IMPRESSION:    moderate periventricular and deep white matter ischemia      < end of copied text >      ASSESSMENT & PLAN:

## 2018-04-03 NOTE — CONSULT NOTE ADULT - ASSESSMENT
86 y/o male with h/o CAD s/p CABG, chronic A. fib (on coumadin), prior mitral valve clipping, HTN, HLD, CKD stage III, anemia of chronic disease, colonic AVM, DM2, SILVERIO, dementia, hiatal hernia was admitted on 3/28 for increased weakness s/p multiple falls and syncope. Pt had a recent syncopal episode on 3/27 for which she was evaluated in ER. Son reported another episode of syncope which lasted a few seconds. He then had 2 other episodes of near syncope which he felt weak but did not lose consciousness. On the evening PTA, the patient went to sleep downstairs on the couch and when the family woke up this morning he was found on the ground. No fever or chills reported at home. He was noted with left 2nd toe pain, redness and swelling.    1. Left 2nd toe cellulitis. 84 y/o male with h/o CAD s/p CABG, chronic A. fib (on coumadin), prior mitral valve clipping, HTN, HLD, CKD stage III, anemia of chronic disease, colonic AVM, DM2, SILVERIO, dementia, hiatal hernia was admitted on 3/28 for increased weakness s/p multiple falls and syncope. Pt had a recent syncopal episode on 3/27 for which she was evaluated in ER. Son reported another episode of syncope which lasted a few seconds. He then had 2 other episodes of near syncope which he felt weak but did not lose consciousness. On the evening PTA, the patient went to sleep downstairs on the couch and when the family woke up this morning he was found on the ground. No fever or chills reported at home. He was noted with left 2nd toe pain, redness and swelling.    1. Left 2nd toe cellulitis. Left foot cellulitis. Urinary retention with ghosh in place. Cystitis.  -obtain BC x 2  -start vancomycin 500 mg IV q12h and ceftriaxone 1 gm IV qd  -reason for abx use and side effects reviewed with patient; monitor BMP and vancomycin trough levels   -consider voiding trial  -podiatry evaluation appreciated  -local toe wound care  -monitor temps  -f/u CBC  -supportive care  2. Other issues:   -care per medicine

## 2018-04-03 NOTE — CONSULT NOTE ADULT - SUBJECTIVE AND OBJECTIVE BOX
HPI:  84 yo male with extensive PMH of CAD s/p CABG, chronic a. fib (on coumadin), h/o Mitral valve clipping, HTN, HLD, CKD stage III, Anemia of chronic disease, h/o colonic AVM, DM2, SILVERIO, dementia, hiatal hernia presents to ED s/p multiple falls and syncope. Pt poor historian secondary to dementia and history obtained from family at bedside. Pt presented to ED on 3/27 after episode of syncope as per family and hit the back of his head on the ground. He was found to have a head laceration which was repaired with staples. CT head at the time was negative and pt was discharged home. While patient was walking into his house he had another episode of syncope which lasted a few seconds as per son. He did not hit his head this time. He then had 2 other episodes of near syncope which he felt weak but did not lose consciousness. He went to sleep downstairs on the couch and when the family woke up this morning he was found on the ground. Currently pt resting comfortably and is pleasantly confused. A+Ox2.  Denies any fevers, chills, headaches, dizziness, palpitations, chest pain, SOB, abd pain, N/V, diarrhea. (28 Mar 2018 17:53) Psychiatry consulted for depression.    Upon interview pt is calm, tearful throughout interview, confused, oriented to self , partially to place and time. Pt is aware of cognitive decline and memory impairment and is saddened by advancing age and loss of potential. Pt appears to be demoralized and has a sense of futility. Pt reports he was an  with marriage that was "ok" He admits to feeling depressed about not knowing how he got to the hospital after falling and hitting his head. Denies any SI,  No AH/VH/paranoia. Pt cannot discuss his medical condition in a meaningful way, unable to list his medical issues and does not know any medications he is taking.       Current Psychiatric Tx  Provider: none  Meds: none    Social Hx  Employment: retired   Substance abuse: denies  Living situation: private residence    Past Psychiatric Hx  Past hospitalizations: denies  Past rehab: denies  Past suicidality/aggression: denies    Family History of Psychiatric/substance abuse: denies    Review of Sx: memory impairment, confusion, All other systems negative      Risk assessment :  moderate due to dementia    Mental Status Exam  Oriented to self, partially to place and time  General Appearance:  well nourished, average build, no deformities present  Behavior: cooperative, good eye contact, well related  Muscle tone/ Strength: No abnormal movements  Gait/Station: not observed  Speech: normal volume and rate, increased latency,  spontaneous, normal articulation  Mood:  depressed  Affect: congruent, constricted,  Thought Process:  mostly linear and goal oriented, thought frequently forgets questions asked.   Thought Associations: some loosening   Thought Content: ruminations  Perceptions: wnl  Attention/Concentration:  distracted  Recent Memory: impaired  Remote Memory: mostly intact  Fund of knowledge: some impairment   Language: wnl, English speaking  Judgement : fair  Insight: fair      Labs, EKG and imaging reviewed    04-03    140  |  107  |  49<H>  ----------------------------<  192<H>  4.6   |  28  |  1.36<H>    Ca    9.1      03 Apr 2018 06:24                          9.6    6.63  )-----------( 156      ( 03 Apr 2018 06:24 )             27.8   < from: 12 Lead ECG (03.29.18 @ 07:42) >  Ventricular Rate 55 BPM    Atrial Rate 45 BPM    QRS Duration 172 ms    Q-T Interval 482 ms    QTC Calculation(Bezet) 461 ms    R Axis -79 degrees    T Axis 13 degrees    Diagnosis Line Atrial fibrillation with slow ventricular response with premature ventricular or aberrantly conducted complexes  Left axis deviation  Right bundle branch block  Abnormal ECG  When compared with ECG of 28-MAR-2018 11:56,  No significant change was found  Confirmed by CAMILLE LANCASTER ([id number]) on 4/1/2018 7:33:56 PM    < end of copied text >  < from: CT Head No Cont (04.02.18 @ 15:05) >  IMPRESSION:    moderate periventricular and deep white matter ischemia    < end of copied text >

## 2018-04-03 NOTE — PROGRESS NOTE ADULT - ATTENDING COMMENTS
Pt. seen and examined with LUCY Carvalho. Agree with assessment and plan as above. Changes made where appropriate. Pt. seen and examined with LUCY Carvalho. Agree with assessment and plan as above. Changes made where appropriate.    Patient seen in [facility] in [facility], appears depressed. Knows he's in [facility].   Lungs: CTA B/L  Abd: Soft / NT / ND  Ext: marked dorsal erythema from 2nd left toe and TTP.     Xray Left foot: no fracture    Start on IV abx for cellulitis today, discussed w/ Dr. Garcia.   - voiding trial, add Flomax.     Rest per above.

## 2018-04-03 NOTE — CONSULT NOTE ADULT - ASSESSMENT
84 yo male with extensive PMH of CAD s/p CABG, chronic a. fib (on coumadin), h/o Mitral valve clipping, HTN, HLD, CKD stage III, Anemia of chronic disease, h/o colonic AVM, DM2, SILVERIO, dementia, hiatal hernia presents to ED s/p multiple falls and syncope. Psych consulted for depression    Impression  Demoralization with depressed mood, No SI.     Recommendation  Lexapro 5 mg QD  Follow up at [facility] for any needed dose adjustment.

## 2018-04-03 NOTE — PROGRESS NOTE ADULT - ASSESSMENT
86 yo male with extensive h/o CAD s/p CABG, chronic a. fib (on coumadin), h/o Mitral valve clipping, HTN, HLD, CKD stage III, Anemia of chronic disease, h/o colonic AVM, DM2, SILVERIO, dementia, hiatal hernia, TIA is seen and evaluated for:    1. Cellulitis vs Acute trauma at second digit, left foot  2. Pain 2nd digit, left foot  3. DM  4. PVD b/l     Plan:  Pt is seen and evaluated; d/w attending Dr. Jones   Applied betadine soaked gauze/carmen tariq splint to 2nd and 3rd digit, left foot  Dispensed surgical shoe and instructed pt to ambulate with surgical shoe to the left foot  Dispensed Z-flex offloading boot b/l to offload the heels   Recommend ID consult to evaluate the left 2nd toe   Podiatry will continue to follow while in house

## 2018-04-04 LAB
% ALBUMIN: 63.9 % — SIGNIFICANT CHANGE UP
% ALPHA 1: 5.4 % — SIGNIFICANT CHANGE UP
% ALPHA 2: 8.3 % — SIGNIFICANT CHANGE UP
% BETA: 11 % — SIGNIFICANT CHANGE UP
% GAMMA: 11.4 % — SIGNIFICANT CHANGE UP
ALBUMIN SERPL ELPH-MCNC: 3.2 G/DL — LOW (ref 3.6–5.5)
ALBUMIN/GLOB SERPL ELPH: 1.8 RATIO — SIGNIFICANT CHANGE UP
ALPHA1 GLOB SERPL ELPH-MCNC: 0.3 G/DL — SIGNIFICANT CHANGE UP (ref 0.1–0.4)
ALPHA2 GLOB SERPL ELPH-MCNC: 0.4 G/DL — LOW (ref 0.5–1)
ANION GAP SERPL CALC-SCNC: 4 MMOL/L — LOW (ref 5–17)
B-GLOBULIN SERPL ELPH-MCNC: 0.6 G/DL — SIGNIFICANT CHANGE UP (ref 0.5–1)
BUN SERPL-MCNC: 48 MG/DL — HIGH (ref 7–23)
CALCIUM SERPL-MCNC: 8.6 MG/DL — SIGNIFICANT CHANGE UP (ref 8.5–10.1)
CHLORIDE SERPL-SCNC: 106 MMOL/L — SIGNIFICANT CHANGE UP (ref 96–108)
CO2 SERPL-SCNC: 27 MMOL/L — SIGNIFICANT CHANGE UP (ref 22–31)
CREAT SERPL-MCNC: 1.34 MG/DL — HIGH (ref 0.5–1.3)
GAMMA GLOBULIN: 0.6 G/DL — SIGNIFICANT CHANGE UP (ref 0.6–1.6)
GLUCOSE BLDC GLUCOMTR-MCNC: 142 MG/DL — HIGH (ref 70–99)
GLUCOSE BLDC GLUCOMTR-MCNC: 210 MG/DL — HIGH (ref 70–99)
GLUCOSE BLDC GLUCOMTR-MCNC: 212 MG/DL — HIGH (ref 70–99)
GLUCOSE BLDC GLUCOMTR-MCNC: 394 MG/DL — HIGH (ref 70–99)
GLUCOSE SERPL-MCNC: 227 MG/DL — HIGH (ref 70–99)
HCT VFR BLD CALC: 23.9 % — LOW (ref 39–50)
HGB BLD-MCNC: 8.1 G/DL — LOW (ref 13–17)
INR BLD: 1.87 RATIO — HIGH (ref 0.88–1.16)
INTERPRETATION SERPL IFE-IMP: SIGNIFICANT CHANGE UP
MCHC RBC-ENTMCNC: 33.1 PG — SIGNIFICANT CHANGE UP (ref 27–34)
MCHC RBC-ENTMCNC: 33.9 GM/DL — SIGNIFICANT CHANGE UP (ref 32–36)
MCV RBC AUTO: 97.6 FL — SIGNIFICANT CHANGE UP (ref 80–100)
NRBC # BLD: 0 /100 WBCS — SIGNIFICANT CHANGE UP (ref 0–0)
PLATELET # BLD AUTO: 145 K/UL — LOW (ref 150–400)
POTASSIUM SERPL-MCNC: 4.6 MMOL/L — SIGNIFICANT CHANGE UP (ref 3.5–5.3)
POTASSIUM SERPL-SCNC: 4.6 MMOL/L — SIGNIFICANT CHANGE UP (ref 3.5–5.3)
PROT PATTERN SERPL ELPH-IMP: SIGNIFICANT CHANGE UP
PROTHROM AB SERPL-ACNC: 20.4 SEC — HIGH (ref 9.8–12.7)
RBC # BLD: 2.45 M/UL — LOW (ref 4.2–5.8)
RBC # FLD: 15.2 % — HIGH (ref 10.3–14.5)
SODIUM SERPL-SCNC: 137 MMOL/L — SIGNIFICANT CHANGE UP (ref 135–145)
WBC # BLD: 5 K/UL — SIGNIFICANT CHANGE UP (ref 3.8–10.5)
WBC # FLD AUTO: 5 K/UL — SIGNIFICANT CHANGE UP (ref 3.8–10.5)

## 2018-04-04 PROCEDURE — 73718 MRI LOWER EXTREMITY W/O DYE: CPT | Mod: 26,LT

## 2018-04-04 RX ORDER — FINASTERIDE 5 MG/1
5 TABLET, FILM COATED ORAL DAILY
Qty: 0 | Refills: 0 | Status: DISCONTINUED | OUTPATIENT
Start: 2018-04-04 | End: 2018-04-07

## 2018-04-04 RX ORDER — HALOPERIDOL DECANOATE 100 MG/ML
0.5 INJECTION INTRAMUSCULAR
Qty: 0 | Refills: 0 | Status: DISCONTINUED | OUTPATIENT
Start: 2018-04-04 | End: 2018-04-05

## 2018-04-04 RX ORDER — HALOPERIDOL DECANOATE 100 MG/ML
0.5 INJECTION INTRAMUSCULAR EVERY 6 HOURS
Qty: 0 | Refills: 0 | Status: DISCONTINUED | OUTPATIENT
Start: 2018-04-04 | End: 2018-04-04

## 2018-04-04 RX ADMIN — FINASTERIDE 5 MILLIGRAM(S): 5 TABLET, FILM COATED ORAL at 13:53

## 2018-04-04 RX ADMIN — Medication 100 MILLIGRAM(S): at 17:59

## 2018-04-04 RX ADMIN — CEFTRIAXONE 1000 MILLIGRAM(S): 500 INJECTION, POWDER, FOR SOLUTION INTRAMUSCULAR; INTRAVENOUS at 11:39

## 2018-04-04 RX ADMIN — PANTOPRAZOLE SODIUM 40 MILLIGRAM(S): 20 TABLET, DELAYED RELEASE ORAL at 05:20

## 2018-04-04 RX ADMIN — Medication 1 MILLIGRAM(S): at 13:53

## 2018-04-04 RX ADMIN — Medication 2: at 17:59

## 2018-04-04 RX ADMIN — SIMVASTATIN 40 MILLIGRAM(S): 20 TABLET, FILM COATED ORAL at 21:55

## 2018-04-04 RX ADMIN — Medication 100 MILLIGRAM(S): at 13:53

## 2018-04-04 RX ADMIN — Medication 110 MILLIGRAM(S): at 13:56

## 2018-04-04 RX ADMIN — Medication 3: at 21:55

## 2018-04-04 RX ADMIN — Medication 2: at 08:32

## 2018-04-04 RX ADMIN — ESCITALOPRAM OXALATE 5 MILLIGRAM(S): 10 TABLET, FILM COATED ORAL at 13:54

## 2018-04-04 RX ADMIN — Medication 100 MILLIGRAM(S): at 05:20

## 2018-04-04 RX ADMIN — FLUDROCORTISONE ACETATE 0.1 MILLIGRAM(S): 0.1 TABLET ORAL at 05:20

## 2018-04-04 RX ADMIN — HALOPERIDOL DECANOATE 0.5 MILLIGRAM(S): 100 INJECTION INTRAMUSCULAR at 19:45

## 2018-04-04 RX ADMIN — TAMSULOSIN HYDROCHLORIDE 0.4 MILLIGRAM(S): 0.4 CAPSULE ORAL at 21:55

## 2018-04-04 RX ADMIN — Medication 2000 UNIT(S): at 13:53

## 2018-04-04 NOTE — PROGRESS NOTE ADULT - SUBJECTIVE AND OBJECTIVE BOX
Date of Service 04-04-18     84 yo male with extensive h/o CAD s/p CABG, chronic a. fib (on coumadin), h/o Mitral valve clipping, HTN, HLD, CKD stage III, Anemia of chronic disease, h/o colonic AVM, DM2, SILVERIO, dementia, hiatal hernia, TIA is seen at bedside for followup care of left 2nd toe pain, redness and swelling. Pt denies any lower extremity pain or discomfort including all toes. Pt denies fever, chills, nausea, vomiting, SOB, chest pain.     PMH: as above  Allergies: No Known Allergies    MEDICATIONS  (STANDING):  cefTRIAXone Injectable.      cefTRIAXone Injectable. 1000 milliGRAM(s) IV Push every 24 hours  cholecalciferol 2000 Unit(s) Oral daily  dextrose 5%. 1000 milliLiter(s) (50 mL/Hr) IV Continuous <Continuous>  dextrose 50% Injectable 12.5 Gram(s) IV Push once  dextrose 50% Injectable 25 Gram(s) IV Push once  dextrose 50% Injectable 25 Gram(s) IV Push once  escitalopram 5 milliGRAM(s) Oral daily  fludroCORTISONE 0.1 milliGRAM(s) Oral daily  folic acid 1 milliGRAM(s) Oral daily  insulin lispro (HumaLOG) corrective regimen sliding scale   SubCutaneous three times a day before meals  insulin lispro (HumaLOG) corrective regimen sliding scale   SubCutaneous at bedtime  pantoprazole    Tablet 40 milliGRAM(s) Oral before breakfast  polyethylene glycol 3350 17 Gram(s) Oral daily  simvastatin 40 milliGRAM(s) Oral at bedtime  sodium ferric gluconate complex IVPB 125 milliGRAM(s) IV Intermittent daily  tamsulosin 0.4 milliGRAM(s) Oral at bedtime  vancomycin  IVPB 500 milliGRAM(s) IV Intermittent every 12 hours  vancomycin  IVPB        MEDICATIONS  (PRN):  acetaminophen   Tablet. 650 milliGRAM(s) Oral every 6 hours PRN Mild Pain (1 - 3)  dextrose Gel 1 Dose(s) Oral once PRN Blood Glucose LESS THAN 70 milliGRAM(s)/deciliter  docusate sodium 100 milliGRAM(s) Oral two times a day PRN Constipation  glucagon  Injectable 1 milliGRAM(s) IntraMuscular once PRN Glucose LESS THAN 70 milligrams/deciliter  senna 2 Tablet(s) Oral at bedtime PRN Constipation      Vital Signs Last 24 Hrs  T(C): 36.9 (04 Apr 2018 04:50), Max: 36.9 (04 Apr 2018 04:50)  T(F): 98.4 (04 Apr 2018 04:50), Max: 98.4 (04 Apr 2018 04:50)  HR: 67 (04 Apr 2018 04:50) (62 - 76)  BP: 129/64 (04 Apr 2018 04:50) (112/64 - 130/60)  BP(mean): --  RR: 17 (04 Apr 2018 04:50) (17 - 17)  SpO2: 100% (04 Apr 2018 04:50) (92% - 100%)      PHYSICAL EXAM:    Constitutional: NAD, awake and alert  Lower Extremity Exam:  Left lower extremity is larger and more edematous than the right   Derm:  Left foot: erythema and edema circumscribed the 2nd toe extending around the dorsal 2nd MPJ, improving. Diffused dry skin. No open lesion, no malodor, no fluctuance, no purulence, no active drainage. No interdigital maceration or fissure. Nails are trimmed.   Right foot: Diffused dry skin. No open lesion, no malodor, no fluctuance, no purulence, no active drainage, no erythema, no clinical signs of infection. No interdigital maceration or fissure. Nails are trimmed.   Vascular: Left 2nd toe is warmer than the rest of the toes. Pedal pulses palpable 1/4 DP and PT on the right. Non-palpable DP and PT on the L 2/2 edema. Pitting edema, L>R. Pedal hair absent b/l.   Neuro: was not able to assess  Ortho: pain upon palpation of the left 2nd toe and left 2nd MPJ ROM. No pain upon palpation and ROM of the remaining of the left foot and the right foot including toes, STJ, and ankle ROM. Not able to fully assess during this visit.     LABS: All Labs Reviewed:                                    8.1    5.00  )-----------( 145      ( 04 Apr 2018 05:33 )             23.9     04-04    137  |  106  |  48<H>  ----------------------------<  227<H>  4.6   |  27  |  1.34<H>    Ca    8.6      04 Apr 2018 05:33        Blood Culture:     RADIOLOGY/EKG:  .Urine None  03-28 @ 14:42   No growth  --  --    RADIOLOGY:    < from: Xray Foot AP + Lateral, Left (04.01.18 @ 15:55) >    EXAM:  XR FOOT-LEFT-2 VIEWS                            PROCEDURE DATE:  04/01/2018          INTERPRETATION:  Radiographs of the left foot         CLINICAL INFORMATION:   Foot pain.    TECHNIQUE:  Frontal, oblique and lateral views of the foot were obtained.    FINDINGS:   No prior similar studies are available for review.    There is degenerative change at the first MTP joint with a screw within   the distal first metatarsal.    The hindfoot appears intact.  No significant spur formation is   recognized.  The soft tissues appear intact.    Atherosclerotic vascular calcifications are noted.    IMPRESSION: No acute injury. Previous surgery of the first metatarsal   with placement of screw and degenerative change at the first MTP joint.

## 2018-04-04 NOTE — PROGRESS NOTE ADULT - SUBJECTIVE AND OBJECTIVE BOX
Patient is a 85y old  Male who presents with a chief complaint of syncope/fall (28 Mar 2018 17:53)    Date of service: 04-04-18 @ 13:21    Weak looking  Alert and confused    ROS: unable to obtain    MEDICATIONS  (STANDING):  cefTRIAXone Injectable.      cefTRIAXone Injectable. 1000 milliGRAM(s) IV Push every 24 hours  cholecalciferol 2000 Unit(s) Oral daily  dextrose 5%. 1000 milliLiter(s) (50 mL/Hr) IV Continuous <Continuous>  dextrose 50% Injectable 12.5 Gram(s) IV Push once  dextrose 50% Injectable 25 Gram(s) IV Push once  dextrose 50% Injectable 25 Gram(s) IV Push once  escitalopram 5 milliGRAM(s) Oral daily  finasteride 5 milliGRAM(s) Oral daily  fludroCORTISONE 0.1 milliGRAM(s) Oral daily  folic acid 1 milliGRAM(s) Oral daily  insulin lispro (HumaLOG) corrective regimen sliding scale   SubCutaneous three times a day before meals  insulin lispro (HumaLOG) corrective regimen sliding scale   SubCutaneous at bedtime  pantoprazole    Tablet 40 milliGRAM(s) Oral before breakfast  polyethylene glycol 3350 17 Gram(s) Oral daily  simvastatin 40 milliGRAM(s) Oral at bedtime  sodium ferric gluconate complex IVPB 125 milliGRAM(s) IV Intermittent daily  tamsulosin 0.4 milliGRAM(s) Oral at bedtime  vancomycin  IVPB 500 milliGRAM(s) IV Intermittent every 12 hours  vancomycin  IVPB        MEDICATIONS  (PRN):  acetaminophen   Tablet. 650 milliGRAM(s) Oral every 6 hours PRN Mild Pain (1 - 3)  dextrose Gel 1 Dose(s) Oral once PRN Blood Glucose LESS THAN 70 milliGRAM(s)/deciliter  docusate sodium 100 milliGRAM(s) Oral two times a day PRN Constipation  glucagon  Injectable 1 milliGRAM(s) IntraMuscular once PRN Glucose LESS THAN 70 milligrams/deciliter  senna 2 Tablet(s) Oral at bedtime PRN Constipation      Vital Signs Last 24 Hrs  T(C): 36.9 (04 Apr 2018 11:45), Max: 36.9 (04 Apr 2018 04:50)  T(F): 98.4 (04 Apr 2018 11:45), Max: 98.4 (04 Apr 2018 04:50)  HR: 58 (04 Apr 2018 11:45) (58 - 68)  BP: 112/56 (04 Apr 2018 11:45) (112/56 - 130/60)  BP(mean): --  RR: 17 (04 Apr 2018 11:45) (17 - 17)  SpO2: 100% (04 Apr 2018 11:45) (96% - 100%)    Physical Exam:      Constitutional: frail looking  HEENT: NC/AT, EOMI, PERRLA  Neck: supple  Back: no tenderness  Respiratory: clear  Cardiovascular: S1S2 regular, no murmurs  Abdomen: soft, not tender, not distended, positive BS  Genitourinary: no LN palpable  Musculoskeletal: no muscle tenderness, no joint swelling or tenderness  Extremities: no pedal edema; left 2nd and 3rd toe erythema, edema, tenderness and warmths; left 3nd toe dorsal aspect open wound; erythema improving  Neurological: confused, moving all extremities  Skin: no rashes    Labs:                        8.1    5.00  )-----------( 145      ( 04 Apr 2018 05:33 )             23.9     04-04    137  |  106  |  48<H>  ----------------------------<  227<H>  4.6   |  27  |  1.34<H>    Ca    8.6      04 Apr 2018 05:33                        9.6    6.63  )-----------( 156      ( 03 Apr 2018 06:24 )             27.8     04-03    140  |  107  |  49<H>  ----------------------------<  192<H>  4.6   |  28  |  1.36<H>    Ca    9.1      03 Apr 2018 06:24      Culture - Urine (collected 28 Mar 2018 14:42)  Source: .Urine None  Final Report (29 Mar 2018 23:11):    No growth        Radiology:    < from: Xray Foot AP + Lateral, Left (04.01.18 @ 15:55) >  No acute injury. Previous surgery of the first metatarsal   with placement of screw and degenerative change at the first MTP joint.    < end of copied text >    < from: CT Abdomen and Pelvis No Cont (04.01.18 @ 14:41) >  No retroperitoneal hemorrhage.  Osborne catheter inflated within the apex of the prostate gland.  Thickened urinary bladder with surrounding inflammatory changes,   correlate for cystitis, new from 3/28.  Otherwise no acute findings provided the limitation of a noncontrast exam.    < end of copied text >      Advanced directives addressed: full resuscitation Patient is a 85y old  Male who presents with a chief complaint of syncope/fall (28 Mar 2018 17:53)    Date of service: 04-04-18 @ 13:21    Weak looking  Alert and confused    ROS: unable to obtain    MEDICATIONS  (STANDING):  cefTRIAXone Injectable.      cefTRIAXone Injectable. 1000 milliGRAM(s) IV Push every 24 hours  cholecalciferol 2000 Unit(s) Oral daily  dextrose 5%. 1000 milliLiter(s) (50 mL/Hr) IV Continuous <Continuous>  dextrose 50% Injectable 12.5 Gram(s) IV Push once  dextrose 50% Injectable 25 Gram(s) IV Push once  dextrose 50% Injectable 25 Gram(s) IV Push once  escitalopram 5 milliGRAM(s) Oral daily  finasteride 5 milliGRAM(s) Oral daily  fludroCORTISONE 0.1 milliGRAM(s) Oral daily  folic acid 1 milliGRAM(s) Oral daily  insulin lispro (HumaLOG) corrective regimen sliding scale   SubCutaneous three times a day before meals  insulin lispro (HumaLOG) corrective regimen sliding scale   SubCutaneous at bedtime  pantoprazole    Tablet 40 milliGRAM(s) Oral before breakfast  polyethylene glycol 3350 17 Gram(s) Oral daily  simvastatin 40 milliGRAM(s) Oral at bedtime  sodium ferric gluconate complex IVPB 125 milliGRAM(s) IV Intermittent daily  tamsulosin 0.4 milliGRAM(s) Oral at bedtime  vancomycin  IVPB 500 milliGRAM(s) IV Intermittent every 12 hours  vancomycin  IVPB        MEDICATIONS  (PRN):  acetaminophen   Tablet. 650 milliGRAM(s) Oral every 6 hours PRN Mild Pain (1 - 3)  dextrose Gel 1 Dose(s) Oral once PRN Blood Glucose LESS THAN 70 milliGRAM(s)/deciliter  docusate sodium 100 milliGRAM(s) Oral two times a day PRN Constipation  glucagon  Injectable 1 milliGRAM(s) IntraMuscular once PRN Glucose LESS THAN 70 milligrams/deciliter  senna 2 Tablet(s) Oral at bedtime PRN Constipation      Vital Signs Last 24 Hrs  T(C): 36.9 (04 Apr 2018 11:45), Max: 36.9 (04 Apr 2018 04:50)  T(F): 98.4 (04 Apr 2018 11:45), Max: 98.4 (04 Apr 2018 04:50)  HR: 58 (04 Apr 2018 11:45) (58 - 68)  BP: 112/56 (04 Apr 2018 11:45) (112/56 - 130/60)  BP(mean): --  RR: 17 (04 Apr 2018 11:45) (17 - 17)  SpO2: 100% (04 Apr 2018 11:45) (96% - 100%)    Physical Exam:      Constitutional: frail looking  HEENT: NC/AT, EOMI, PERRLA  Neck: supple  Back: no tenderness  Respiratory: clear  Cardiovascular: S1S2 regular, no murmurs  Abdomen: soft, not tender, not distended, positive BS  Genitourinary: no LN palpable  Musculoskeletal: no muscle tenderness, no joint swelling or tenderness  Extremities: no pedal edema; left 2nd and 3rd toe erythema, edema, tenderness and warmths -extending to left forefoot; erythema slightly better  Neurological: confused, moving all extremities  Skin: no rashes    Labs:                        8.1    5.00  )-----------( 145      ( 04 Apr 2018 05:33 )             23.9     04-04    137  |  106  |  48<H>  ----------------------------<  227<H>  4.6   |  27  |  1.34<H>    Ca    8.6      04 Apr 2018 05:33                        9.6    6.63  )-----------( 156      ( 03 Apr 2018 06:24 )             27.8     04-03    140  |  107  |  49<H>  ----------------------------<  192<H>  4.6   |  28  |  1.36<H>    Ca    9.1      03 Apr 2018 06:24      Culture - Urine (collected 28 Mar 2018 14:42)  Source: .Urine None  Final Report (29 Mar 2018 23:11):    No growth        Radiology:    < from: Xray Foot AP + Lateral, Left (04.01.18 @ 15:55) >  No acute injury. Previous surgery of the first metatarsal   with placement of screw and degenerative change at the first MTP joint.    < end of copied text >    < from: CT Abdomen and Pelvis No Cont (04.01.18 @ 14:41) >  No retroperitoneal hemorrhage.  Osborne catheter inflated within the apex of the prostate gland.  Thickened urinary bladder with surrounding inflammatory changes,   correlate for cystitis, new from 3/28.  Otherwise no acute findings provided the limitation of a noncontrast exam.    < end of copied text >      Advanced directives addressed: full resuscitation

## 2018-04-04 NOTE — PROGRESS NOTE ADULT - ASSESSMENT
84 y/o male with h/o CAD s/p CABG, chronic A. fib (on coumadin), prior mitral valve clipping, HTN, HLD, CKD stage III, anemia of chronic disease, colonic AVM, DM2, SILVERIO, dementia, hiatal hernia was admitted on 3/28 for increased weakness s/p multiple falls and syncope. Pt had a recent syncopal episode on 3/27 for which she was evaluated in ER. Son reported another episode of syncope which lasted a few seconds. He then had 2 other episodes of near syncope which he felt weak but did not lose consciousness. On the evening PTA, the patient went to sleep downstairs on the couch and when the family woke up this morning he was found on the ground. No fever or chills reported at home. He was noted with left 2nd toe pain, redness and swelling.    1. Left 2nd toe cellulitis. Left foot cellulitis. Urinary retention.  -BC x 2 are negative to date  -on vancomycin 500 mg IV q12h and ceftriaxone 1 gm IV qd # 2  -tolerating abx well so far; no side effects noted  -obtain vancomycin trough level  -continue abx coverage   -podiatry evaluation appreciated  -local toe wound care  -monitor temps  -f/u CBC  -supportive care  2. Other issues:   -care per medicine 86 y/o male with h/o CAD s/p CABG, chronic A. fib (on coumadin), prior mitral valve clipping, HTN, HLD, CKD stage III, anemia of chronic disease, colonic AVM, DM2, SILVERIO, dementia, hiatal hernia was admitted on 3/28 for increased weakness s/p multiple falls and syncope. Pt had a recent syncopal episode on 3/27 for which she was evaluated in ER. Son reported another episode of syncope which lasted a few seconds. He then had 2 other episodes of near syncope which he felt weak but did not lose consciousness. On the evening PTA, the patient went to sleep downstairs on the couch and when the family woke up this morning he was found on the ground. No fever or chills reported at home. He was noted with left 2nd toe pain, redness and swelling.    1. Left 2nd toe cellulitis. Left foot cellulitis. Possible underlying OM. ?PVD. CRF stage 3. s/p urinary retention.  -BC x 2 are negative to date  -on vancomycin 500 mg IV q12h and ceftriaxone 1 gm IV qd # 2  -tolerating abx well so far; no side effects noted  -obtain vancomycin trough level  -obtain MRI left foot  -continue abx coverage   -podiatry evaluation appreciated  -local toe wound care  -monitor temps  -f/u CBC  -supportive care  2. Other issues:   -care per medicine

## 2018-04-04 NOTE — PROGRESS NOTE ADULT - SUBJECTIVE AND OBJECTIVE BOX
CC: syncope/fall (28 Mar 2018 17:53)    HPI from ED:  86 yo male with extensive PMH of CAD s/p CABG, chronic a. fib (on coumadin), h/o Mitral valve clipping, HTN, HLD, CKD stage III, Anemia of chronic disease, h/o colonic AVM, DM2, SILVERIO, dementia, hiatal hernia presents to ED s/p multiple falls and syncope. Pt poor historian secondary to dementia and history obtained from family at bedside. Pt presented to ED on 3/27 after episode of syncope as per family and hit the back of his head on the ground. He was found to have a head laceration which was repaired with staples. CT head at the time was negative and pt was discharged home. While patient was walking into his house he had another episode of syncope which lasted a few seconds as per son. He did not hit his head this time. He then had 2 other episodes of near syncope which he felt weak but did not lose consciousness. He went to sleep downstairs on the couch and when the family woke up this morning he was found on the ground. Currently pt resting comfortably and is pleasantly confused. A+Ox2.  Denies any fevers, chills, headaches, dizziness, palpitations, chest pain, SOB, abd pain, N/V, diarrhea. (28 Mar 2018 17:53)    4/3 - Pt. seen and examined with Dr. Forbes in the chair at nursing station. Pt. answers some questions appropriately but he is confused in some of his responses. C/o pain upon palpation to left foot.     4/4 - Pt. seen and examined with Dr. Forbes in the chair at nursing station. Pt. states "I'm confused." Re-oriented. Pt. answers some questions but unsure of others, unchanged. Denies pain.       REVIEW OF SYSTEMS:  unable to obtain as Pt has dementia     PHYSICAL EXAM:    Vital Signs Last 24 Hrs  T(C): 36.9 (04 Apr 2018 11:45), Max: 36.9 (04 Apr 2018 04:50)  T(F): 98.4 (04 Apr 2018 11:45), Max: 98.4 (04 Apr 2018 04:50)  HR: 58 (04 Apr 2018 11:45) (58 - 68)  BP: 112/56 (04 Apr 2018 11:45) (112/56 - 130/60)  BP(mean): --  RR: 17 (04 Apr 2018 11:45) (17 - 17)  SpO2: 100% (04 Apr 2018 11:45) (96% - 100%)      General: Well developed; malnourished; in no acute distress  Eyes: PERRLA, EOMI; conjunctiva and sclera clear  Head: Normocephalic; + laceration, repaired, no erythema, no drainage    ENMT: No nasal discharge; airway clear  Neck: Supple; non tender; no masses  Respiratory: Good air entry, No wheezes, rales or rhonchi  Cardiovascular: Regular rate and rhythm. S1 and S2 Normal; No murmurs  Gastrointestinal: Soft non-tender non-distended; Normal bowel sounds  Genitourinary: No costovertebral angle tenderness, pt retaining urine, will replace ghosh today  Extremities: Normal range of motion, No edema  Neurological: Alert and oriented x2, non focal   Skin: Warm and dry. No acute rash. Left 2nd and 3rd toe erythema, edema, tenderness and warmth  Musculoskeletal: Normal muscle  tone, without deformities  Psychiatric: Cooperative and appropriate        Labs & Radiology:                                      8.1    5.00  )-----------( 145      ( 04 Apr 2018 05:33 )             23.9     04-04    137  |  106  |  48<H>  ----------------------------<  227<H>  4.6   |  27  |  1.34<H>    Ca    8.6      04 Apr 2018 05:33    PT/INR - ( 04 Apr 2018 05:33 )   PT: 20.4 sec;   INR: 1.87 ratio               CT Cervical Spine:  IMPRESSION:   No vertebral fracture is recognized.  Grade 1 anterior   spondylolisthesis at C6-7 on a degenerative basis.    Narrowing of the   LEFT C2-3, BILATERAL C3-4, LEFT C4-5, LEFT C5-6 and C6-7 neural foramina   due to uncovertebral spurring and facet osteophytic hypertrophy.     CT Abd/pelvis/chest:      IMPRESSION: No traumatic injury.  Markedly distended urinary bladder.   Renal ultrasound is recommended for further evaluation of the   indeterminate hyperdense left kidney lesion.      CT Head:  IMPRESSION:   Moderate periventricular and deep white matter ischemia is   unchanged.    LEFT parietal scalp hematoma is noted with skin staples.    SHOULDER XRAY:  IMPRESSION:   No prior examinations are available for review.    No fracture or dislocation is identified.  Acromioclavicular joint is   intact. Soft tissues are intact. Median sternotomy.        CT Head No Cont (04.02.18 @ 15:05) >  IMPRESSION:    moderate periventricular and deep white matter ischemia    Xray Foot AP + Lateral, Left (04.01.18 @ 15:55)   IMPRESSION: No acute injury. Previous surgery of the first metatarsal   with placement of screw and degenerative change at the first MTP joint.              MEDICATIONS  (STANDING):  cefTRIAXone Injectable.      cefTRIAXone Injectable. 1000 milliGRAM(s) IV Push every 24 hours  cholecalciferol 2000 Unit(s) Oral daily  dextrose 5%. 1000 milliLiter(s) (50 mL/Hr) IV Continuous <Continuous>  dextrose 50% Injectable 12.5 Gram(s) IV Push once  dextrose 50% Injectable 25 Gram(s) IV Push once  dextrose 50% Injectable 25 Gram(s) IV Push once  escitalopram 5 milliGRAM(s) Oral daily  finasteride 5 milliGRAM(s) Oral daily  fludroCORTISONE 0.1 milliGRAM(s) Oral daily  folic acid 1 milliGRAM(s) Oral daily  insulin lispro (HumaLOG) corrective regimen sliding scale   SubCutaneous three times a day before meals  insulin lispro (HumaLOG) corrective regimen sliding scale   SubCutaneous at bedtime  pantoprazole    Tablet 40 milliGRAM(s) Oral before breakfast  polyethylene glycol 3350 17 Gram(s) Oral daily  simvastatin 40 milliGRAM(s) Oral at bedtime  tamsulosin 0.4 milliGRAM(s) Oral at bedtime  vancomycin  IVPB 500 milliGRAM(s) IV Intermittent every 12 hours  vancomycin  IVPB        MEDICATIONS  (PRN):  acetaminophen   Tablet. 650 milliGRAM(s) Oral every 6 hours PRN Mild Pain (1 - 3)  dextrose Gel 1 Dose(s) Oral once PRN Blood Glucose LESS THAN 70 milliGRAM(s)/deciliter  docusate sodium 100 milliGRAM(s) Oral two times a day PRN Constipation  glucagon  Injectable 1 milliGRAM(s) IntraMuscular once PRN Glucose LESS THAN 70 milligrams/deciliter  haloperidol    Injectable 0.5 milliGRAM(s) IntraMuscular every 6 hours PRN Severe Agitation  senna 2 Tablet(s) Oral at bedtime PRN Constipation          HOSPITAL COURSE BY PROBLEM:  ASSESSMENT & PLAN:    # Multiple falls with syncope and near syncope  - likely Orthostatic due to dehydration and hypovolemia   - Orthostatic VS improved after IVF and started on Florinef   - no events on tele, off tele now  - CT head neg   - CT head negative  - trop neg x 3  - echo results noted, valves stable, EF normal   - completed IVF, monitor off, encourage oral hydration  - PT       # EM on CKD stage III with Azotemia   - likely dehydration and obstruction   - CR down to normal with IVF, but start trending up on oral hydration   - hold torsemide (as per son dose was increased in past few weeks)   - watch off IV fluids  - Repeat CT: ghosh  not in place, repositioned by nursing staff,  draining well now. L mild hydro? Monitor if renal Fx worse might need follow up renal sono  - Renal Sono with L complex cyst.   - Monitor UO and Renal Fx   - failed trial void, replace ghosh catheter ---> as per family pt has had prostate issues in the past  - started on Flomax and Proscar    # Acute on Chronic Anemia of blood loss?   - h/o AVM GI bleed, and as per family bled significantly from laceration  - baseline hgb about 12, possibly has dilutional component  - S/p 1U PRBcs   - No signs of acute bleeding now   - IN EMR had EGD 3/7 with gastric erosions  and bleeding stigmata?   - CT Chest/abd/pelvis neg for bleeding on admission and repeat CT on 4/1   - B12, folate WNL  - stool occult blood neg   - Iron panel: Iron deficiency and inflammation   - Retic count appropriate   - Monitor H/H closely  - C/w PPI  - c/w IV Iron   - hold Coumadin    # Chronic A. fib. SupraTx INR   - rate controlled  - INR daily,  2.29 today  - hold coumadin, discuss with family about restarting due to risks vs benefits    # Cellulitis vs Acute trauma at second digit, left foot  - ID consult appreciated ---> -start vancomycin 500 mg IV q12h and ceftriaxone 1 gm IV qd  - MRI to rule out OM as discussed with Dr. Garcia  - podiatry consult appreciated ---> Applied betadine soaked gauze/carmen tariq splint to 2nd and 3rd digit, left foot. Dispensed surgical shoe and instructed pt to ambulate with surgical shoe to the left foot. Dispensed Z-flex offloading boot b/l to offload the heels.  - xray left foot IMPRESSION: No acute injury. Previous surgery of the first metatarsal with placement of screw and degenerative change at the first MTP joint.    # Dementia with possible Depression  - supportive care   - Psych eval appreciated  - added Lexapro 5mg QD    # DM2  - HgbA1c = 7.3%  - hold januvia  - ISS, finger sticks    # HLD  - continue statin    # DVT prophylaxis  - off Coumadin, add Venodynes    # Dispo  - f/u MRI resulsts ---> c/w IV ABX  - pt will need rehab when medically stable for discharge ---> discussed with pt's wife, daughter and son at bedside CC: syncope/fall (28 Mar 2018 17:53)    HPI from ED:  86 yo male with extensive PMH of CAD s/p CABG, chronic a. fib (on coumadin), h/o Mitral valve clipping, HTN, HLD, CKD stage III, Anemia of chronic disease, h/o colonic AVM, DM2, SILVERIO, dementia, hiatal hernia presents to ED s/p multiple falls and syncope. Pt poor historian secondary to dementia and history obtained from family at bedside. Pt presented to ED on 3/27 after episode of syncope as per family and hit the back of his head on the ground. He was found to have a head laceration which was repaired with staples. CT head at the time was negative and pt was discharged home. While patient was walking into his house he had another episode of syncope which lasted a few seconds as per son. He did not hit his head this time. He then had 2 other episodes of near syncope which he felt weak but did not lose consciousness. He went to sleep downstairs on the couch and when the family woke up this morning he was found on the ground. Currently pt resting comfortably and is pleasantly confused. A+Ox2.  Denies any fevers, chills, headaches, dizziness, palpitations, chest pain, SOB, abd pain, N/V, diarrhea. (28 Mar 2018 17:53)    4/3 - Pt. seen and examined with Dr. Forbes in the chair at nursing station. Pt. answers some questions appropriately but he is confused in some of his responses. C/o pain upon palpation to left foot.     4/4 - Pt. seen and examined with Dr. Forbes in the chair at nursing station. Pt. states "I'm confused." Re-oriented. Pt. answers some questions but unsure of others, unchanged. Denies pain.       REVIEW OF SYSTEMS:  unable to obtain as Pt has dementia     PHYSICAL EXAM:    Vital Signs Last 24 Hrs  T(C): 36.9 (04 Apr 2018 11:45), Max: 36.9 (04 Apr 2018 04:50)  T(F): 98.4 (04 Apr 2018 11:45), Max: 98.4 (04 Apr 2018 04:50)  HR: 58 (04 Apr 2018 11:45) (58 - 68)  BP: 112/56 (04 Apr 2018 11:45) (112/56 - 130/60)  BP(mean): --  RR: 17 (04 Apr 2018 11:45) (17 - 17)  SpO2: 100% (04 Apr 2018 11:45) (96% - 100%)      General: Well developed; malnourished; in no acute distress  Eyes: PERRLA, EOMI; conjunctiva and sclera clear  Head: Normocephalic; + laceration, repaired, no erythema, no drainage    ENMT: No nasal discharge; airway clear  Neck: Supple; non tender; no masses  Respiratory: Good air entry, No wheezes, rales or rhonchi  Cardiovascular: Regular rate and rhythm. S1 and S2 Normal; No murmurs  Gastrointestinal: Soft non-tender non-distended; Normal bowel sounds  Genitourinary: No costovertebral angle tenderness, pt retaining urine, will replace ghosh today  Extremities: Normal range of motion, No edema  Neurological: Alert and oriented x2, non focal   Skin: Warm and dry. No acute rash. Left 2nd and 3rd toe erythema, edema, tenderness and warmth  Musculoskeletal: Normal muscle  tone, without deformities  Psychiatric: Cooperative and appropriate        Labs & Radiology:                                      8.1    5.00  )-----------( 145      ( 04 Apr 2018 05:33 )             23.9     04-04    137  |  106  |  48<H>  ----------------------------<  227<H>  4.6   |  27  |  1.34<H>    Ca    8.6      04 Apr 2018 05:33    PT/INR - ( 04 Apr 2018 05:33 )   PT: 20.4 sec;   INR: 1.87 ratio               CT Cervical Spine:  IMPRESSION:   No vertebral fracture is recognized.  Grade 1 anterior   spondylolisthesis at C6-7 on a degenerative basis.    Narrowing of the   LEFT C2-3, BILATERAL C3-4, LEFT C4-5, LEFT C5-6 and C6-7 neural foramina   due to uncovertebral spurring and facet osteophytic hypertrophy.     CT Abd/pelvis/chest:      IMPRESSION: No traumatic injury.  Markedly distended urinary bladder.   Renal ultrasound is recommended for further evaluation of the   indeterminate hyperdense left kidney lesion.      CT Head:  IMPRESSION:   Moderate periventricular and deep white matter ischemia is   unchanged.    LEFT parietal scalp hematoma is noted with skin staples.    SHOULDER XRAY:  IMPRESSION:   No prior examinations are available for review.    No fracture or dislocation is identified.  Acromioclavicular joint is   intact. Soft tissues are intact. Median sternotomy.        CT Head No Cont (04.02.18 @ 15:05) >  IMPRESSION:    moderate periventricular and deep white matter ischemia    Xray Foot AP + Lateral, Left (04.01.18 @ 15:55)   IMPRESSION: No acute injury. Previous surgery of the first metatarsal   with placement of screw and degenerative change at the first MTP joint.              MEDICATIONS  (STANDING):  cefTRIAXone Injectable.      cefTRIAXone Injectable. 1000 milliGRAM(s) IV Push every 24 hours  cholecalciferol 2000 Unit(s) Oral daily  dextrose 5%. 1000 milliLiter(s) (50 mL/Hr) IV Continuous <Continuous>  dextrose 50% Injectable 12.5 Gram(s) IV Push once  dextrose 50% Injectable 25 Gram(s) IV Push once  dextrose 50% Injectable 25 Gram(s) IV Push once  escitalopram 5 milliGRAM(s) Oral daily  finasteride 5 milliGRAM(s) Oral daily  fludroCORTISONE 0.1 milliGRAM(s) Oral daily  folic acid 1 milliGRAM(s) Oral daily  insulin lispro (HumaLOG) corrective regimen sliding scale   SubCutaneous three times a day before meals  insulin lispro (HumaLOG) corrective regimen sliding scale   SubCutaneous at bedtime  pantoprazole    Tablet 40 milliGRAM(s) Oral before breakfast  polyethylene glycol 3350 17 Gram(s) Oral daily  simvastatin 40 milliGRAM(s) Oral at bedtime  tamsulosin 0.4 milliGRAM(s) Oral at bedtime  vancomycin  IVPB 500 milliGRAM(s) IV Intermittent every 12 hours  vancomycin  IVPB          HOSPITAL COURSE BY PROBLEM:  ASSESSMENT & PLAN:    # Multiple falls with syncope and near syncope  - likely Orthostatic due to dehydration and hypovolemia   - Orthostatic VS improved after IVF and started on Florinef   - no events on tele, off tele now  - CT head neg on admission and again on repeat CT head negative  - trop neg x 3  - echo results noted, valves stable, EF normal   - completed IVF, monitor off, encourage oral hydration  - PT  --> FOR [facility]ELY eventually    # EM on CKD stage III with Azotemia   - likely dehydration and obstruction   - CR down to normal with IVF, but start trending up on oral hydration   - hold torsemide (as per son dose was increased in past few weeks)   - watch off IV fluids  - Repeat CT: ghosh  not in place, repositioned by nursing staff,  draining well now. L mild hydro? Monitor if renal Fx worse might need follow up renal sono  - Renal Sono with L complex cyst.   - Monitor UO and Renal Fx   - failed trial void, replace ghosh catheter ---> as per family pt has had prostate issues in the past  - started on Flomax and Proscar (was on this in the past)    # Acute on Chronic Anemia of blood loss?   - h/o AVM GI bleed, and as per family bled significantly from laceration  - baseline hgb about 12, possibly has dilutional component  - S/p 1U PRBcs   - No signs of acute bleeding now   - IN EMR had EGD 3/7 with gastric erosions  and bleeding stigmata?   - CT Chest/abd/pelvis neg for bleeding on admission and repeat CT on 4/1   - B12, folate WNL  - stool occult blood neg   - Iron panel: Iron deficiency and inflammation   - Retic count appropriate   - Monitor H/H closely  - C/w PPI  - c/w IV Iron   - hold Coumadin    # Chronic A. fib. SupraTx INR   - rate controlled  - INR daily,  2.29 today  - hold coumadin, discuss with family about restarting due to risks vs benefits    # Cellulitis vs Acute trauma at second digit, left foot  - ID consult appreciated ---> -start vancomycin 500 mg IV q12h and ceftriaxone 1 gm IV qd  - MRI to rule out OM as discussed with Dr. Garcia  - podiatry consult appreciated ---> Applied betadine soaked gauze/carmen tariq splint to 2nd and 3rd digit, left foot. Dispensed surgical shoe and instructed pt to ambulate with surgical shoe to the left foot. Dispensed Z-flex offloading boot b/l to offload the heels.  - xray left foot IMPRESSION: No acute injury. Previous surgery of the first metatarsal with placement of screw and degenerative change at the first MTP joint.    # Dementia with possible Depression  - supportive care   - Psych eval appreciated  - added Lexapro 5mg QD    # DM2  - HgbA1c = 7.3%  - hold januvia  - ISS, finger sticks    # HLD- continue statin    # DVT prophylaxis- off Coumadin, add Venodynes    # Dispo  - f/u MRI resulsts ---> c/w IV ABX  - pt will need rehab when medically stable for discharge ---> discussed with pt's wife, daughter and son at bedside   Total time > 45 mins

## 2018-04-04 NOTE — PROGRESS NOTE ADULT - ASSESSMENT
84 yo male with extensive PMH of CAD s/p CABG, chronic a. fib (on coumadin), h/o Mitral valve clipping, HTN, HLD, CKD stage III, Anemia of chronic disease, h/o colonic AVM, DM2, SILVERIO, dementia, hiatal hernia presents to ED s/p multiple falls and syncope.     1. +Orthostatics- Continue Florinef 0.1 mg. Encourage PO fluids. Repeat orthostatics improved. Avoid any further diuretics for now (especially with CRI).    2. 2Decho- Nl LV fxn, mod MR. Report above.     3. Anemia- Hgb now 8.1 (from 9.6 yesterday). If drops below 8, would transfuse another unit as pt was previously symptomatic. W/u as per primary team. May need GI eval with h/o GI bleed in past.    4. Afib- rate controlled. Cont coumadin keep INR 2-3.     5. Dyslipidemia- cont statin.     6. DVT proph, replete lytes daily.

## 2018-04-04 NOTE — PROGRESS NOTE ADULT - ASSESSMENT
86 yo male with extensive h/o CAD s/p CABG, chronic a. fib (on coumadin), h/o Mitral valve clipping, HTN, HLD, CKD stage III, Anemia of chronic disease, h/o colonic AVM, DM2, SILVERIO, dementia, hiatal hernia, TIA is seen and evaluated for:    1. Cellulitis vs Acute trauma at second digit, left foot, improving   2. Pain 2nd digit, left foot  3. DM  4. PVD b/l     Plan:  Pt is seen and evaluated with attending Dr. Jones   No dressing/tariq splint application to the left 2nd toe during this visit   Dispensed surgical shoe and instructed pt to ambulate with surgical shoe to the left foot  Dispensed Z-flex offloading boot b/l to offload the heels   IV abx per ID, appreciated   Consider vascular consult for possible vascular etiology of the left 2nd toe pain if symptom/pain not improving    Podiatry will continue to follow while in house

## 2018-04-04 NOTE — PROGRESS NOTE ADULT - ATTENDING COMMENTS
Pt. seen and examined with LUCY Carvalho. Agree with assessment and plan as above. Changes made where appropriate. Pt. seen and examined with LUCY Carvalho. Agree with assessment and plan as above. Changes made where appropriate.    Lengthy discussion with family and wife at bedside this afternoon. Discussed patient's condition including depression in the elderly with patient's awareness of memory loss. Lexapro initiated per Psych recs however discussed that this is not a 'quick fix" and that family support is best.     - plan: check MRI foot r/o OM, prn Haldol if needed for severe agitation, discussed medication use w/ family who are hesistant, pt did tolerate this 2 days ago.  - IV abx per ID.     Rest per above .

## 2018-04-04 NOTE — PROVIDER CONTACT NOTE (OTHER) - REASON
Anemia
Cardio consult.
Consult- Infectious Disease
Hematuria
Low H+H
Podiatry Consult
Nephrology consult.

## 2018-04-04 NOTE — PROGRESS NOTE ADULT - SUBJECTIVE AND OBJECTIVE BOX
Cardiology Progress Note    HPI: 84 yo male with extensive PMH of CAD s/p CABG, chronic a. fib (on coumadin), h/o Mitral valve clipping, HTN, HLD, CKD stage III, Anemia of chronic disease, h/o colonic AVM, DM2, SILVERIO, dementia, hiatal hernia presents to ED s/p multiple falls and syncope. Pt poor historian secondary to dementia and history obtained from family at bedside. Pt presented to ED on 3/27 after episode of syncope as per family and hit the back of his head on the ground. He was found to have a head laceration which was repaired with staples. CT head at the time was negative and pt was discharged home. While patient was walking into his house he had another episode of syncope which lasted a few seconds as per son. He did not hit his head this time. He then had 2 other episodes of near syncope which he felt weak but did not lose consciousness. He went to sleep downstairs on the couch and when the family woke up this morning he was found on the ground. Currently pt resting comfortably and is pleasantly confused. A+Ox2.  Denies any fevers, chills, headaches, dizziness, palpitations, chest pain, SOB, abd pain, N/V, diarrhea. (28 Mar 2018 17:53)    4/3- Confused. No CP/SOB. No new complaints.     4/4- No CP/SOB. No events last pm. No new complaints today.     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  Transfusion history  Anemia with chronic illness  SILVERIO (obstructive sleep apnea): on CPAP  TIA (transient ischemic attack)  CKD (chronic kidney disease)  MR (mitral regurgitation)  Memory loss, short term  Hiatal hernia  DM (diabetes mellitus screen)  HTN (hypertension)  HLD (hyperlipidemia)  BPH (benign prostatic hyperplasia)  Chronic atrial fibrillation  History of tonsillectomy  H/O heart surgery: Clip  H/O cystoscopy: TURP  History of hernia surgery  S/P CABG x 4    Allergies  No Known Allergies    Intolerances    SOCIAL HISTORY: Denies tobacco, etoh abuse or illicit drug use    FAMILY HISTORY: Family history of myocardial infarction (Father)    MEDICATIONS  (STANDING):  cholecalciferol 2000 Unit(s) Oral daily  dextrose 5%. 1000 milliLiter(s) (50 mL/Hr) IV Continuous <Continuous>  dextrose 50% Injectable 12.5 Gram(s) IV Push once  dextrose 50% Injectable 25 Gram(s) IV Push once  dextrose 50% Injectable 25 Gram(s) IV Push once  fludroCORTISONE 0.1 milliGRAM(s) Oral daily  folic acid 1 milliGRAM(s) Oral daily  insulin lispro (HumaLOG) corrective regimen sliding scale   SubCutaneous three times a day before meals  insulin lispro (HumaLOG) corrective regimen sliding scale   SubCutaneous at bedtime  pantoprazole  Injectable 40 milliGRAM(s) IV Push daily  polyethylene glycol 3350 17 Gram(s) Oral daily  simvastatin 40 milliGRAM(s) Oral at bedtime  sodium chloride 0.9%. 1000 milliLiter(s) (50 mL/Hr) IV Continuous <Continuous>  sodium chloride 0.9%. 1000 milliLiter(s) (50 mL/Hr) IV Continuous <Continuous>  sodium ferric gluconate complex IVPB 125 milliGRAM(s) IV Intermittent daily    MEDICATIONS  (PRN):  acetaminophen   Tablet. 650 milliGRAM(s) Oral every 6 hours PRN Mild Pain (1 - 3)  dextrose Gel 1 Dose(s) Oral once PRN Blood Glucose LESS THAN 70 milliGRAM(s)/deciliter  docusate sodium 100 milliGRAM(s) Oral two times a day PRN Constipation  glucagon  Injectable 1 milliGRAM(s) IntraMuscular once PRN Glucose LESS THAN 70 milligrams/deciliter  senna 2 Tablet(s) Oral at bedtime PRN Constipation      Vital Signs Last 24 Hrs  T(C): 36.8 (03 Apr 2018 05:19), Max: 36.8 (03 Apr 2018 05:19)  T(F): 98.2 (03 Apr 2018 05:19), Max: 98.2 (03 Apr 2018 05:19)  HR: 61 (03 Apr 2018 05:19) (61 - 69)  BP: 138/70 (03 Apr 2018 05:19) (120/52 - 141/63)  BP(mean): --  RR: 17 (03 Apr 2018 05:19) (17 - 17)  SpO2: 100% (03 Apr 2018 05:19) (94% - 100%)    REVIEW OF SYSTEMS:    CONSTITUTIONAL:  As per HPI.  HEENT:  Eyes:  No diplopia or blurred vision. ENT:  No earache, sore throat or runny nose.  CARDIOVASCULAR:  No pressure, squeezing, strangling, tightness, heaviness or aching about the chest, neck, axilla or epigastrium.  RESPIRATORY:  No cough, shortness of breath, PND or orthopnea.  GASTROINTESTINAL:  No nausea, vomiting or diarrhea.  GENITOURINARY:  No dysuria, frequency or urgency.  MUSCULOSKELETAL:  As per HPI.  NEUROLOGIC:  No paresthesias, fasciculations, seizures or weakness.    PHYSICAL EXAMINATION:    GENERAL APPEARANCE:  Pt. is not currently dyspneic, in no distress. Pt. is alert, oriented, and pleasant.  HEENT:  Pupils are normal and react normally. No icterus. Mucous membranes well colored.  NECK:  Supple. No lymphadenopathy. Jugular venous pressure not elevated. Carotids equal.   HEART:   The cardiac impulse has a normal quality. There are no murmurs, rubs or gallops noted  CHEST:  Chest is clear to auscultation. Normal respiratory effort.  ABDOMEN:  Soft and nontender.   EXTREMITIES:  There is no edema.     I&O's Summary    02 Apr 2018 07:01  -  03 Apr 2018 07:00  --------------------------------------------------------  IN: 210 mL / OUT: 850 mL / NET: -640 mL    LABS:                        9.6    6.63  )-----------( 156      ( 03 Apr 2018 06:24 )             27.8     04-03    140  |  107  |  49<H>  ----------------------------<  192<H>  4.6   |  28  |  1.36<H>    Ca    9.1      03 Apr 2018 06:24    PT/INR - ( 03 Apr 2018 06:24 )   PT: 25.1 sec;   INR: 2.29 ratio      EKG: < from: 12 Lead ECG (03.29.18 @ 07:42) >  Atrial fibrillation with slow ventricular response with premature ventricular or aberrantly conducted complexes  Left axis deviation  Right bundle branch block  Abnormal ECG    < end of copied text >    TELEMETRY: Not on tele.    CARDIAC TESTS: < from: Transthoracic Echocardiogram (03.29.18 @ 09:13) >  Fibrocalcificchanges noted to the Aortic valve leaflets with preserved   leaflet excursion.   Trace aortic regurgitation is present.     The left atrium is moderately dilated.     Mild concentric left ventricular hypertrophy is present.   Estimated left ventricular ejection fraction is 65-70 %.     Fibrocalcific changes noted to the mitral valve leaflets with preserved   leaflet excursion.   Cannot rule out mitral valve clip.   At least moderate (2+) eccentric mitral regurgitation is present.     Normal appearing pulmonic valve structure and function.   The right atrium appears mildly dilated.   A device wire is seen in the RV and RA.   Mild to Moderate Tricuspid regurgitation is present.      < end of copied text >      RADIOLOGY & ADDITIONAL STUDIES: < from: CT Head No Cont (04.02.18 @ 15:05) >    IMPRESSION:    moderate periventricular and deep white matter ischemia      < end of copied text >      ASSESSMENT & PLAN:

## 2018-04-05 ENCOUNTER — TRANSCRIPTION ENCOUNTER (OUTPATIENT)
Age: 83
End: 2018-04-05

## 2018-04-05 LAB
ANION GAP SERPL CALC-SCNC: 6 MMOL/L — SIGNIFICANT CHANGE UP (ref 5–17)
BUN SERPL-MCNC: 44 MG/DL — HIGH (ref 7–23)
CALCIUM SERPL-MCNC: 8.6 MG/DL — SIGNIFICANT CHANGE UP (ref 8.5–10.1)
CHLORIDE SERPL-SCNC: 108 MMOL/L — SIGNIFICANT CHANGE UP (ref 96–108)
CO2 SERPL-SCNC: 27 MMOL/L — SIGNIFICANT CHANGE UP (ref 22–31)
CREAT SERPL-MCNC: 1.19 MG/DL — SIGNIFICANT CHANGE UP (ref 0.5–1.3)
GLUCOSE BLDC GLUCOMTR-MCNC: 187 MG/DL — HIGH (ref 70–99)
GLUCOSE BLDC GLUCOMTR-MCNC: 219 MG/DL — HIGH (ref 70–99)
GLUCOSE BLDC GLUCOMTR-MCNC: 235 MG/DL — HIGH (ref 70–99)
GLUCOSE BLDC GLUCOMTR-MCNC: 254 MG/DL — HIGH (ref 70–99)
GLUCOSE SERPL-MCNC: 162 MG/DL — HIGH (ref 70–99)
HCT VFR BLD CALC: 25.5 % — LOW (ref 39–50)
HGB BLD-MCNC: 8.4 G/DL — LOW (ref 13–17)
INR BLD: 1.3 RATIO — HIGH (ref 0.88–1.16)
MCHC RBC-ENTMCNC: 32.6 PG — SIGNIFICANT CHANGE UP (ref 27–34)
MCHC RBC-ENTMCNC: 32.9 GM/DL — SIGNIFICANT CHANGE UP (ref 32–36)
MCV RBC AUTO: 98.8 FL — SIGNIFICANT CHANGE UP (ref 80–100)
NRBC # BLD: 0 /100 WBCS — SIGNIFICANT CHANGE UP (ref 0–0)
PLATELET # BLD AUTO: 155 K/UL — SIGNIFICANT CHANGE UP (ref 150–400)
POTASSIUM SERPL-MCNC: 4.2 MMOL/L — SIGNIFICANT CHANGE UP (ref 3.5–5.3)
POTASSIUM SERPL-SCNC: 4.2 MMOL/L — SIGNIFICANT CHANGE UP (ref 3.5–5.3)
PROTHROM AB SERPL-ACNC: 14.1 SEC — HIGH (ref 9.8–12.7)
RBC # BLD: 2.58 M/UL — LOW (ref 4.2–5.8)
RBC # FLD: 15.2 % — HIGH (ref 10.3–14.5)
SODIUM SERPL-SCNC: 141 MMOL/L — SIGNIFICANT CHANGE UP (ref 135–145)
VANCOMYCIN TROUGH SERPL-MCNC: 11.1 UG/ML — SIGNIFICANT CHANGE UP (ref 10–20)
WBC # BLD: 4.78 K/UL — SIGNIFICANT CHANGE UP (ref 3.8–10.5)
WBC # FLD AUTO: 4.78 K/UL — SIGNIFICANT CHANGE UP (ref 3.8–10.5)

## 2018-04-05 RX ORDER — FOLIC ACID 0.8 MG
1 TABLET ORAL
Qty: 0 | Refills: 0 | DISCHARGE
Start: 2018-04-05

## 2018-04-05 RX ORDER — DOCUSATE SODIUM 100 MG
1 CAPSULE ORAL
Qty: 0 | Refills: 0 | DISCHARGE
Start: 2018-04-05

## 2018-04-05 RX ORDER — FLUDROCORTISONE ACETATE 0.1 MG/1
1 TABLET ORAL
Qty: 0 | Refills: 0 | DISCHARGE
Start: 2018-04-05

## 2018-04-05 RX ORDER — FINASTERIDE 5 MG/1
1 TABLET, FILM COATED ORAL
Qty: 0 | Refills: 0 | DISCHARGE
Start: 2018-04-05

## 2018-04-05 RX ORDER — SENNA PLUS 8.6 MG/1
2 TABLET ORAL
Qty: 0 | Refills: 0 | DISCHARGE
Start: 2018-04-05

## 2018-04-05 RX ORDER — ESCITALOPRAM OXALATE 10 MG/1
1 TABLET, FILM COATED ORAL
Qty: 0 | Refills: 0 | DISCHARGE
Start: 2018-04-05

## 2018-04-05 RX ORDER — TAMSULOSIN HYDROCHLORIDE 0.4 MG/1
1 CAPSULE ORAL
Qty: 0 | Refills: 0 | DISCHARGE
Start: 2018-04-05

## 2018-04-05 RX ORDER — ACETAMINOPHEN 500 MG
2 TABLET ORAL
Qty: 0 | Refills: 0 | DISCHARGE
Start: 2018-04-05

## 2018-04-05 RX ORDER — SODIUM CHLORIDE 9 MG/ML
500 INJECTION INTRAMUSCULAR; INTRAVENOUS; SUBCUTANEOUS ONCE
Qty: 0 | Refills: 0 | Status: COMPLETED | OUTPATIENT
Start: 2018-04-05 | End: 2018-04-05

## 2018-04-05 RX ADMIN — CEFTRIAXONE 1000 MILLIGRAM(S): 500 INJECTION, POWDER, FOR SOLUTION INTRAMUSCULAR; INTRAVENOUS at 09:53

## 2018-04-05 RX ADMIN — Medication 100 MILLIGRAM(S): at 17:04

## 2018-04-05 RX ADMIN — SODIUM CHLORIDE 500 MILLILITER(S): 9 INJECTION INTRAMUSCULAR; INTRAVENOUS; SUBCUTANEOUS at 09:48

## 2018-04-05 RX ADMIN — Medication 1 MILLIGRAM(S): at 11:49

## 2018-04-05 RX ADMIN — FLUDROCORTISONE ACETATE 0.1 MILLIGRAM(S): 0.1 TABLET ORAL at 05:39

## 2018-04-05 RX ADMIN — SIMVASTATIN 40 MILLIGRAM(S): 20 TABLET, FILM COATED ORAL at 21:34

## 2018-04-05 RX ADMIN — Medication 2000 UNIT(S): at 11:49

## 2018-04-05 RX ADMIN — Medication 2: at 17:09

## 2018-04-05 RX ADMIN — POLYETHYLENE GLYCOL 3350 17 GRAM(S): 17 POWDER, FOR SOLUTION ORAL at 11:49

## 2018-04-05 RX ADMIN — Medication 100 MILLIGRAM(S): at 05:39

## 2018-04-05 RX ADMIN — Medication 2: at 08:39

## 2018-04-05 RX ADMIN — Medication 3: at 11:50

## 2018-04-05 RX ADMIN — FINASTERIDE 5 MILLIGRAM(S): 5 TABLET, FILM COATED ORAL at 11:49

## 2018-04-05 RX ADMIN — ESCITALOPRAM OXALATE 5 MILLIGRAM(S): 10 TABLET, FILM COATED ORAL at 11:49

## 2018-04-05 RX ADMIN — TAMSULOSIN HYDROCHLORIDE 0.4 MILLIGRAM(S): 0.4 CAPSULE ORAL at 21:34

## 2018-04-05 RX ADMIN — PANTOPRAZOLE SODIUM 40 MILLIGRAM(S): 20 TABLET, DELAYED RELEASE ORAL at 08:39

## 2018-04-05 NOTE — DISCHARGE NOTE ADULT - PATIENT PORTAL LINK FT
You can access the [facility] Patient Portal, offered by [facility], by registering with the following website: [email or web address]

## 2018-04-05 NOTE — PROGRESS NOTE ADULT - SUBJECTIVE AND OBJECTIVE BOX
Date of Service 04-05-18     86 yo male with extensive h/o CAD s/p CABG, chronic a. fib (on coumadin), h/o Mitral valve clipping, HTN, HLD, CKD stage III, Anemia of chronic disease, h/o colonic AVM, DM2, SILVERIO, dementia, hiatal hernia, TIA is seen at bedside for followup care of left 2nd toe pain, redness and swelling. Pt reports no pain or discomfort in his lower extremity including all toes upon resting. Pt states his 2nd toe is painful upon touching or any pressure to it. Pt states he thinks the left 2nd toe is broken. Pt denies fever, chills, nausea, vomiting, SOB, chest pain.     PMH: as above  Allergies: No Known Allergies    MEDICATIONS  (STANDING):  cefTRIAXone Injectable.      cefTRIAXone Injectable. 1000 milliGRAM(s) IV Push every 24 hours  cholecalciferol 2000 Unit(s) Oral daily  dextrose 5%. 1000 milliLiter(s) (50 mL/Hr) IV Continuous <Continuous>  dextrose 50% Injectable 12.5 Gram(s) IV Push once  dextrose 50% Injectable 25 Gram(s) IV Push once  dextrose 50% Injectable 25 Gram(s) IV Push once  escitalopram 5 milliGRAM(s) Oral daily  finasteride 5 milliGRAM(s) Oral daily  fludroCORTISONE 0.1 milliGRAM(s) Oral daily  folic acid 1 milliGRAM(s) Oral daily  insulin lispro (HumaLOG) corrective regimen sliding scale   SubCutaneous three times a day before meals  insulin lispro (HumaLOG) corrective regimen sliding scale   SubCutaneous at bedtime  pantoprazole    Tablet 40 milliGRAM(s) Oral before breakfast  polyethylene glycol 3350 17 Gram(s) Oral daily  simvastatin 40 milliGRAM(s) Oral at bedtime  sitaGLIPtin 25 milliGRAM(s) Oral daily  tamsulosin 0.4 milliGRAM(s) Oral at bedtime  vancomycin  IVPB 500 milliGRAM(s) IV Intermittent every 12 hours  vancomycin  IVPB        MEDICATIONS  (PRN):  acetaminophen   Tablet. 650 milliGRAM(s) Oral every 6 hours PRN Mild Pain (1 - 3)  dextrose Gel 1 Dose(s) Oral once PRN Blood Glucose LESS THAN 70 milliGRAM(s)/deciliter  docusate sodium 100 milliGRAM(s) Oral two times a day PRN Constipation  glucagon  Injectable 1 milliGRAM(s) IntraMuscular once PRN Glucose LESS THAN 70 milligrams/deciliter  senna 2 Tablet(s) Oral at bedtime PRN Constipation    Vital Signs Last 24 Hrs  T(C): 37.2 (05 Apr 2018 04:52), Max: 37.2 (05 Apr 2018 04:52)  T(F): 99 (05 Apr 2018 04:52), Max: 99 (05 Apr 2018 04:52)  HR: 58 (05 Apr 2018 04:52) (58 - 71)  BP: 125/62 (05 Apr 2018 04:52) (112/56 - 150/76)  BP(mean): --  RR: 16 (04 Apr 2018 22:00) (16 - 17)  SpO2: 99% (05 Apr 2018 04:52) (99% - 100%)    PHYSICAL EXAM:    Constitutional: NAD, awake and alert  Lower Extremity Exam:  Left lower extremity is larger and more edematous than the right   Derm:  Left foot: erythema and edema circumscribed the 2nd toe extending around the dorsal 2nd MPJ (improving, receding to toe only with less erythema and edema). Diffused dry skin. No open lesion, no malodor, no fluctuance, no purulence, no active drainage. No interdigital maceration or fissure. Nails are trimmed.   Right foot: Diffused dry skin. No open lesion, no malodor, no fluctuance, no purulence, no active drainage, no erythema, no clinical signs of infection. No interdigital maceration or fissure. Nails are trimmed.   Vascular: Left 2nd toe is warmer than the rest of the toes. Pedal pulses palpable 1/4 DP and PT on the right. Non-palpable DP and PT on the L. Dorsal pitting edema (L>R, improving). Pedal hair absent b/l.   Neuro: was not able to assess  Ortho: pain upon palpation of the left 2nd toe and left 2nd MPJ ROM. No pain upon palpation and ROM of the remaining of the left foot and the right foot including toes, STJ, and ankle ROM. Not able to fully assess during this visit.     LABS: All Labs Reviewed:                                   8.4    4.78  )-----------( 155      ( 05 Apr 2018 05:27 )             25.5     04-05    141  |  108  |  44<H>  ----------------------------<  162<H>  4.2   |  27  |  1.19    Ca    8.6      05 Apr 2018 05:27                       Blood Culture:     RADIOLOGY/EKG:  .Urine None  03-28 @ 14:42   No growth  --  --    RADIOLOGY:    < from: Xray Foot AP + Lateral, Left (04.01.18 @ 15:55) >    EXAM:  XR FOOT-LEFT-2 VIEWS                            PROCEDURE DATE:  04/01/2018          INTERPRETATION:  Radiographs of the left foot         CLINICAL INFORMATION:   Foot pain.    TECHNIQUE:  Frontal, oblique and lateral views of the foot were obtained.    FINDINGS:   No prior similar studies are available for review.    There is degenerative change at the first MTP joint with a screw within   the distal first metatarsal.    The hindfoot appears intact.  No significant spur formation is   recognized.  The soft tissues appear intact.    Atherosclerotic vascular calcifications are noted.    IMPRESSION: No acute injury. Previous surgery of the first metatarsal   with placement of screw and degenerative change at the first MTP joint.    < from: MR Foot No Cont, Left (04.04.18 @ 21:11) >  EXAM:  MR FOOT LT                            PROCEDURE DATE:  04/04/2018          INTERPRETATION:      MRI OF THE LEFT FOOT:    CLINICAL INDICATION: Left foot pain with clinical concern for   osteomyelitis.    TECHNIQUE: Multiplanar, multi- sequential MRI of the left left foot was   performed without contrast. Only sagittal T1 and inversion recovery   sequences as well as axial T1 sequence could be obtained. The study is   limited by significant post artifact. Comparisons with the prior left   foot radiographs dated/1/2018    IMPRESSION:    Motion degraded images demonstrate evidence of prior screw fixation of   the distal first metatarsal for bunion correction surgery. There is   severe first MTP joint arthrosis. There is severe diffuse subcutaneous   soft tissue edema dorsally consistent with cellulitis. There is no   organized fluid collection or abscess is identified.    No definite marrow signal abnormality identified to suggest osteomyelitis.    IMPRESSION:    Markedly motion degraded exam. Severe dorsal soft tissue cellulitis   without evidence of abscess or osteomyelitis.

## 2018-04-05 NOTE — PROGRESS NOTE ADULT - ASSESSMENT
84 yo male with extensive h/o CAD s/p CABG, chronic a. fib (on coumadin), h/o Mitral valve clipping, HTN, HLD, CKD stage III, Anemia of chronic disease, h/o colonic AVM, DM2, SILVERIO, dementia, hiatal hernia, TIA is seen and evaluated for:    1. Cellulitis vs Acute trauma at second digit, left foot, improving   2. Pain 2nd digit, left foot  3. DM  4. PVD b/l     Plan:  Pt is seen and evaluated with attending Dr. Jones   MRI left foot reviewed   Dispensed surgical shoe and instructed pt to ambulate with surgical shoe to the left foot  Dispensed Z-flex offloading boot b/l to offload the heels   IV abx per ID, appreciated   Consider vascular consult for possible vascular etiology of the left 2nd toe pain if symptom/pain not improving    Podiatry will continue to follow while in house 84 yo male with extensive h/o CAD s/p CABG, chronic a. fib (on coumadin), h/o Mitral valve clipping, HTN, HLD, CKD stage III, Anemia of chronic disease, h/o colonic AVM, DM2, SILVERIO, dementia, hiatal hernia, TIA is seen and evaluated for:    1. Cellulitis vs Acute trauma at second digit, left foot, improving   2. Pain 2nd digit, left foot  3. DM  4. PVD b/l     Plan:  Pt is seen and evaluated with attending Dr. Jones   MRI left foot reviewed   Dispensed surgical shoe and instructed pt to ambulate with surgical shoe to the left foot  Dispensed Z-flex offloading boot b/l to offload the heels   IV abx per ID, appreciated   Medical management per medicine, appreciated   Podiatry will continue to follow while in house

## 2018-04-05 NOTE — DISCHARGE NOTE ADULT - CARE PROVIDER_API CALL
Tacho Carvajal), Urology  [address]  [address]  [place] [address]  Phone: [phone]  Fax: [phone]    Guido Tee), Internal Medicine; Pulmonary Disease  [address] [place] [address]  Phone: [phone]  Fax: [phone]    Sharif Garcia), Infectious Disease; Internal Medicine  [address]  [address]  [place] [address]  Phone: [phone]  Fax: [phone]

## 2018-04-05 NOTE — DISCHARGE NOTE ADULT - HOSPITAL COURSE
CC: Fall / confusion    HPI: This is an 84 yo male with extensive PMH of CAD s/p CABG, chronic a. fib (on coumadin), h/o Mitral valve clipping, HTN, HLD, CKD stage III, Anemia of chronic disease, h/o colonic AVM, DM2, SILVERIO, dementia, hiatal hernia presents to ED s/p multiple falls and syncope. Pt poor historian secondary to dementia and history obtained from family at bedside. Pt presented to ED on 3/27 after episode of syncope as per family and hit the back of his head on the ground. He was found to have a head laceration which was repaired with staples. CT head at the time was negative and pt was discharged home. While patient was walking into his house he had another episode of syncope which lasted a few seconds as per son. He did not hit his head this time. He then had 2 other episodes of near syncope which he felt weak but did not lose consciousness. He went to sleep downstairs on the couch and when the family woke up this morning he was found on the ground.     Hospital course: Patient admitted and underwent treatment for depression (eval by Psych and started on Lexapro).   - Left foot cellulitis noted and MRI poor study thus decision made w/ ID to treat for probable OM with prolonged oral abx upon dc X 6 weeks after IV abx inpatient.   - He was found to have urinary retention and failed voiding trial X 2 and thus discharged to [facility] w/ ghosh and new meds initiated for BPH (hx of symptoms in the past needing these meds). Family instructed for voiding trail in 1 week, renal US w/o obstruction noted.     Subj: Seen this AM, confused as to why he's in the hospital. No fevers. Foot redness subsiding but still TTP. For [facility] today, family aware. Prefer [place].   ROS: neg unless stated above.   Vital Signs Last 24 Hrs  T(C): 36.8 (05 Apr 2018 11:49), Max: 37.2 (05 Apr 2018 04:52)  T(F): 98.2 (05 Apr 2018 11:49), Max: 99 (05 Apr 2018 04:52)  HR: 58 (05 Apr 2018 04:52) (58 - 71)  BP: 125/62 (05 Apr 2018 04:52) (125/62 - 150/76)  BP(mean): --  RR: 18 (05 Apr 2018 11:49) (16 - 18)  SpO2: 100% (05 Apr 2018 11:49) (99% - 100%)  PHYSICAL EXAM:  Constitutional: NAD, awake and alert, frail appearing elderly male w/ difficulty hearing at times.   HEENT: PERR, EOMI,  MMM  Neck: Soft and supple, No LAD, No JVD  Respiratory: Breath sounds are clear bilaterally, No wheezing, rales or rhonchi  Cardiovascular: S1 and S2, regular rate and rhythm.  Gastrointestinal: Bowel Sounds present, soft, nontender, nondistended, no guarding, no rebound  Extremities: No peripheral edema  Vascular: 2+ peripheral pulses  Neurological: A/O x 2, no focal deficits  Musculoskeletal: 5/5 strength b/l upper and lower extremities  Skin: No rashes except left foot with decreasing dorsal erythema. TTP over 2nd digit.     LABS: All Labs Reviewed:  All images reviewed.  A&P   # Multiple falls with syncope and near syncope  - likely Orthostatic due to dehydration and hypovolemia   - Orthostatic VS improved after IVF and started on Florinef , no events on tele, off tele now  - CT head neg on admission and again on repeat CT head negative, trop neg x 3  - echo results noted, valves stable, EF normal   - PT  --> FOR G[facility]     # EM on CKD stage III with Azotemia - likely dehydration and obstruction   - CR down to normal with IVF, but start trending up on oral hydration   - can resume home torsemide (as per son dose was increased in past few weeks) 3x / week  - Repeat CT: ghosh  not in place, repositioned by nursing staff,  draining well now. L mild hydro? Monitor if renal Fx worse might need follow up renal sono  - Renal Sono with L complex cyst.   - failed trial void, replace ghosh catheter ---> as per family pt has had prostate issues in the past  - started on Flomax and Proscar (was on this in the past), mildly traumatic insertion, clearing up.     # Acute on Chronic Anemia of blood loss?   - h/o AVM GI bleed, and as per family bled significantly from laceration  - baseline hgb about 12, possibly has dilutional component, S/p 1U PRBcs   - No signs of acute bleeding now   - IN EMR had EGD 3/7 with gastric erosions  and bleeding stigmata?   - CT Chest/abd/pelvis neg for bleeding on admission and repeat CT on 4/1   - B12, folate WNL, stool occult blood neg   - Iron panel: Iron deficiency and inflammation   - Retic count appropriate   - C/w PPI  - resume Coumadin upon dc, discussed w/ wife that if pt has bleeding will need to stop coumadin indefinitely. They wish to have pt remain on this for now.    # Cellulitis vs Acute trauma at second digit, left foot  - ID consult appreciated ---> -start vancomycin 500 mg IV q12h and ceftriaxone 1 gm IV qd  - poor MRI of the left foot, discussed w/ Dr. Garcia. plan to treat for probable left foot OM with 6 weeks Doxy. Family aware.   - podiatry consult appreciated ---> Applied betadine soaked gauze/carmen tariq splint to 2nd and 3rd digit, left foot. Dispensed surgical shoe and instructed pt to ambulate with surgical shoe to the left foot. Dispensed Z-flex offloading boot b/l to offload the heels.  - xray left foot IMPRESSION: No acute injury. Previous surgery of the first metatarsal with placement of screw and degenerative change at the first MTP joint.    # Dementia with possible Depression  - supportive care   - Psych eval appreciated  - added Lexapro 5mg QD    # DM2  - HgbA1c = 7.3%  - resume januvia  # HLD- continue statin  DC to [facility]. Wife informed, discussed w/ SW and alerted rehab doctor. Total time > 45 mins

## 2018-04-05 NOTE — PROGRESS NOTE ADULT - ASSESSMENT
84 y/o male with h/o CAD s/p CABG, chronic A. fib (on coumadin), prior mitral valve clipping, HTN, HLD, CKD stage III, anemia of chronic disease, colonic AVM, DM2, SILVERIO, dementia, hiatal hernia was admitted on 3/28 for increased weakness s/p multiple falls and syncope. Pt had a recent syncopal episode on 3/27 for which she was evaluated in ER. Son reported another episode of syncope which lasted a few seconds. He then had 2 other episodes of near syncope which he felt weak but did not lose consciousness. On the evening PTA, the patient went to sleep downstairs on the couch and when the family woke up this morning he was found on the ground. No fever or chills reported at home. He was noted with left 2nd toe pain, redness and swelling.    1. Left 2nd toe cellulitis. Left foot cellulitis. Possible underlying OM. ?PVD. CRF stage 3. s/p urinary retention.  -BC x 2 are negative to date  -left foot and toe are improving  -on vancomycin 500 mg IV q12h and ceftriaxone 1 gm IV qd # 3  -tolerating abx well so far; no side effects noted  -vancomycin trough level is therapeutic  -MRI left foot is poor study w/o contrast, but no OM noted  -would continue therapy with doxycycline 100 mg PO q12h for 6 weeks  -f/u with podiatry as outpatient to monitor left foot and toe  -monitor temps  -f/u CBC  -supportive care  2. Other issues:   -care per medicine

## 2018-04-05 NOTE — DISCHARGE NOTE ADULT - CARE PROVIDERS DIRECT ADDRESSES
,[email or web address]

## 2018-04-05 NOTE — DISCHARGE NOTE ADULT - PLAN OF CARE
Infection control Take Doxycycline antibiotic X 6 weeks for infection. MRI was poor study and suspicion is there for infection thus it is recommended to continue this treatment plan. Stable blood work You received IV transfusions for severe anemia with Hb 8.6 on 4/5/18.   - can resume Coumadin for A. fib but if you have any bleeding issues you will need to consider stopping this medication indefinitely. Stable. Recommend therapy for depression and continue Lexapro 5mg daily. Can consider increasing the dose in 1 month. c/w finesteride and flomax; c/w ghosh catheter at home and f/u outpatient with DR. Carvajal or Francis for voiding trial and attempt to remove ghosh c/w finesteride and flomax; c/w ghosh catheter at home and f/u outpatient with DR. Blanco for voiding trial and attempt to remove ghosh

## 2018-04-05 NOTE — DISCHARGE NOTE ADULT - CARE PLAN
Principal Discharge DX:	Foot osteomyelitis, left  Goal:	Infection control  Assessment and plan of treatment:	Take Doxycycline antibiotic X 6 weeks for infection. MRI was poor study and suspicion is there for infection thus it is recommended to continue this treatment plan.  Secondary Diagnosis:	Anemia with chronic illness  Goal:	Stable blood work  Assessment and plan of treatment:	You received IV transfusions for severe anemia with Hb 8.6 on 4/5/18.   - can resume Coumadin for A. fib but if you have any bleeding issues you will need to consider stopping this medication indefinitely.  Secondary Diagnosis:	Memory loss, short term  Goal:	Stable.  Assessment and plan of treatment:	Recommend therapy for depression and continue Lexapro 5mg daily. Can consider increasing the dose in 1 month. Principal Discharge DX:	Foot osteomyelitis, left  Goal:	Infection control  Assessment and plan of treatment:	Take Doxycycline antibiotic X 6 weeks for infection. MRI was poor study and suspicion is there for infection thus it is recommended to continue this treatment plan.  Secondary Diagnosis:	Anemia with chronic illness  Goal:	Stable blood work  Assessment and plan of treatment:	You received IV transfusions for severe anemia with Hb 8.6 on 4/5/18.   - can resume Coumadin for A. fib but if you have any bleeding issues you will need to consider stopping this medication indefinitely.  Secondary Diagnosis:	Memory loss, short term  Goal:	Stable.  Assessment and plan of treatment:	Recommend therapy for depression and continue Lexapro 5mg daily. Can consider increasing the dose in 1 month.  Secondary Diagnosis:	BPH (benign prostatic hyperplasia)  Goal:	c/w finesteride and flomax; c/w ghosh catheter at home and f/u outpatient with DR. Carvajal or Francis for voiding trial and attempt to remove ghosh  Assessment and plan of treatment:	c/w finesteride and flomax; c/w ghosh catheter at home and f/u outpatient with DR. Blanco for voiding trial and attempt to remove ghosh

## 2018-04-05 NOTE — CHART NOTE - NSCHARTNOTEFT_GEN_A_CORE
Hospitalist Update Note    DC on hold, urology consult for hematuria, likely traumatic as patient failed 2 prior voiding trials this admission and ghosh was re-inserted yesterday.   - started on BPH meds: Flomax and Finesteride which he was on in the past per family.

## 2018-04-05 NOTE — PROGRESS NOTE ADULT - SUBJECTIVE AND OBJECTIVE BOX
Patient is a 85y old  Male who presents with a chief complaint of syncope/fall (28 Mar 2018 17:53)    Date of service: 04-05-18 @ 09:23    Lying in bed in NAD  Left toe pain is improved  No side effects reported    ROS: limited due to confusion, no legs pain, no rashes    MEDICATIONS  (STANDING):  cefTRIAXone Injectable.      cefTRIAXone Injectable. 1000 milliGRAM(s) IV Push every 24 hours  cholecalciferol 2000 Unit(s) Oral daily  dextrose 5%. 1000 milliLiter(s) (50 mL/Hr) IV Continuous <Continuous>  dextrose 50% Injectable 12.5 Gram(s) IV Push once  dextrose 50% Injectable 25 Gram(s) IV Push once  dextrose 50% Injectable 25 Gram(s) IV Push once  escitalopram 5 milliGRAM(s) Oral daily  finasteride 5 milliGRAM(s) Oral daily  fludroCORTISONE 0.1 milliGRAM(s) Oral daily  folic acid 1 milliGRAM(s) Oral daily  insulin lispro (HumaLOG) corrective regimen sliding scale   SubCutaneous three times a day before meals  insulin lispro (HumaLOG) corrective regimen sliding scale   SubCutaneous at bedtime  pantoprazole    Tablet 40 milliGRAM(s) Oral before breakfast  polyethylene glycol 3350 17 Gram(s) Oral daily  simvastatin 40 milliGRAM(s) Oral at bedtime  sitaGLIPtin 25 milliGRAM(s) Oral daily  sodium chloride 0.9% Bolus 500 milliLiter(s) IV Bolus once  tamsulosin 0.4 milliGRAM(s) Oral at bedtime  vancomycin  IVPB 500 milliGRAM(s) IV Intermittent every 12 hours  vancomycin  IVPB        MEDICATIONS  (PRN):  acetaminophen   Tablet. 650 milliGRAM(s) Oral every 6 hours PRN Mild Pain (1 - 3)  dextrose Gel 1 Dose(s) Oral once PRN Blood Glucose LESS THAN 70 milliGRAM(s)/deciliter  docusate sodium 100 milliGRAM(s) Oral two times a day PRN Constipation  glucagon  Injectable 1 milliGRAM(s) IntraMuscular once PRN Glucose LESS THAN 70 milligrams/deciliter  senna 2 Tablet(s) Oral at bedtime PRN Constipation      Vital Signs Last 24 Hrs  T(C): 37.2 (05 Apr 2018 04:52), Max: 37.2 (05 Apr 2018 04:52)  T(F): 99 (05 Apr 2018 04:52), Max: 99 (05 Apr 2018 04:52)  HR: 58 (05 Apr 2018 04:52) (58 - 71)  BP: 125/62 (05 Apr 2018 04:52) (112/56 - 150/76)  BP(mean): --  RR: 16 (04 Apr 2018 22:00) (16 - 17)  SpO2: 99% (05 Apr 2018 04:52) (99% - 100%)    Physical Exam:      Constitutional: frail looking  HEENT: NC/AT, EOMI, PERRLA  Neck: supple  Back: no tenderness  Respiratory: clear  Cardiovascular: S1S2 regular, no murmurs  Abdomen: soft, not tender, not distended, positive BS  Genitourinary: no LN palpable  Musculoskeletal: no muscle tenderness, no joint swelling or tenderness  Extremities: no pedal edema; left 2nd and 3rd toe erythema, edema, tenderness and warmths - improving  Neurological: confused, moving all extremities  Skin: no rashes    Labs:                        8.4    4.78  )-----------( 155      ( 05 Apr 2018 05:27 )             25.5     04-05    141  |  108  |  44<H>  ----------------------------<  162<H>  4.2   |  27  |  1.19    Ca    8.6      05 Apr 2018 05:27         Vancomycin Level, Trough: 11.1 ug/mL (04-05 @ 05:27)                        9.6    6.63  )-----------( 156      ( 03 Apr 2018 06:24 )             27.8     04-03    140  |  107  |  49<H>  ----------------------------<  192<H>  4.6   |  28  |  1.36<H>    Ca    9.1      03 Apr 2018 06:24      Culture - Urine (collected 28 Mar 2018 14:42)  Source: .Urine None  Final Report (29 Mar 2018 23:11):    No growth        Radiology:    < from: Xray Foot AP + Lateral, Left (04.01.18 @ 15:55) >  No acute injury. Previous surgery of the first metatarsal   with placement of screw and degenerative change at the first MTP joint.    < end of copied text >    < from: CT Abdomen and Pelvis No Cont (04.01.18 @ 14:41) >  No retroperitoneal hemorrhage.  Osborne catheter inflated within the apex of the prostate gland.  Thickened urinary bladder with surrounding inflammatory changes,   correlate for cystitis, new from 3/28.  Otherwise no acute findings provided the limitation of a noncontrast exam.    < end of copied text >    < from: MR Foot No Cont, Left (04.04.18 @ 21:11) >  Markedly motion degraded exam. Severe dorsal soft tissue cellulitis   without evidence of abscess or osteomyelitis.    < end of copied text >    Advanced directives addressed: full resuscitation

## 2018-04-06 LAB
GLUCOSE BLDC GLUCOMTR-MCNC: 237 MG/DL — HIGH (ref 70–99)
GLUCOSE BLDC GLUCOMTR-MCNC: 240 MG/DL — HIGH (ref 70–99)
GLUCOSE BLDC GLUCOMTR-MCNC: 241 MG/DL — HIGH (ref 70–99)
GLUCOSE BLDC GLUCOMTR-MCNC: 303 MG/DL — HIGH (ref 70–99)
HCT VFR BLD CALC: 25.6 % — LOW (ref 39–50)
HGB BLD-MCNC: 8.5 G/DL — LOW (ref 13–17)
MCHC RBC-ENTMCNC: 33.1 PG — SIGNIFICANT CHANGE UP (ref 27–34)
MCHC RBC-ENTMCNC: 33.2 GM/DL — SIGNIFICANT CHANGE UP (ref 32–36)
MCV RBC AUTO: 99.6 FL — SIGNIFICANT CHANGE UP (ref 80–100)
NRBC # BLD: 0 /100 WBCS — SIGNIFICANT CHANGE UP (ref 0–0)
PLATELET # BLD AUTO: 157 K/UL — SIGNIFICANT CHANGE UP (ref 150–400)
RBC # BLD: 2.57 M/UL — LOW (ref 4.2–5.8)
RBC # FLD: 15.5 % — HIGH (ref 10.3–14.5)
WBC # BLD: 5.15 K/UL — SIGNIFICANT CHANGE UP (ref 3.8–10.5)
WBC # FLD AUTO: 5.15 K/UL — SIGNIFICANT CHANGE UP (ref 3.8–10.5)

## 2018-04-06 PROCEDURE — 99222 1ST HOSP IP/OBS MODERATE 55: CPT

## 2018-04-06 RX ORDER — LACTOBACILLUS ACIDOPHILUS 100MM CELL
1 CAPSULE ORAL
Qty: 0 | Refills: 0 | Status: DISCONTINUED | OUTPATIENT
Start: 2018-04-06 | End: 2018-04-07

## 2018-04-06 RX ORDER — SODIUM CHLORIDE 9 MG/ML
1000 INJECTION INTRAMUSCULAR; INTRAVENOUS; SUBCUTANEOUS
Qty: 0 | Refills: 0 | Status: DISCONTINUED | OUTPATIENT
Start: 2018-04-06 | End: 2018-04-07

## 2018-04-06 RX ADMIN — CEFTRIAXONE 1000 MILLIGRAM(S): 500 INJECTION, POWDER, FOR SOLUTION INTRAMUSCULAR; INTRAVENOUS at 10:46

## 2018-04-06 RX ADMIN — FINASTERIDE 5 MILLIGRAM(S): 5 TABLET, FILM COATED ORAL at 10:46

## 2018-04-06 RX ADMIN — Medication 100 MILLIGRAM(S): at 05:27

## 2018-04-06 RX ADMIN — Medication 100 MILLIGRAM(S): at 17:21

## 2018-04-06 RX ADMIN — Medication 1 MILLIGRAM(S): at 10:47

## 2018-04-06 RX ADMIN — Medication 2: at 21:46

## 2018-04-06 RX ADMIN — FLUDROCORTISONE ACETATE 0.1 MILLIGRAM(S): 0.1 TABLET ORAL at 05:27

## 2018-04-06 RX ADMIN — ESCITALOPRAM OXALATE 5 MILLIGRAM(S): 10 TABLET, FILM COATED ORAL at 10:47

## 2018-04-06 RX ADMIN — POLYETHYLENE GLYCOL 3350 17 GRAM(S): 17 POWDER, FOR SOLUTION ORAL at 10:46

## 2018-04-06 RX ADMIN — Medication 2000 UNIT(S): at 10:47

## 2018-04-06 RX ADMIN — Medication 2: at 12:36

## 2018-04-06 RX ADMIN — Medication 2: at 08:28

## 2018-04-06 RX ADMIN — Medication 1 TABLET(S): at 15:58

## 2018-04-06 RX ADMIN — TAMSULOSIN HYDROCHLORIDE 0.4 MILLIGRAM(S): 0.4 CAPSULE ORAL at 21:46

## 2018-04-06 RX ADMIN — Medication 2: at 17:21

## 2018-04-06 RX ADMIN — SIMVASTATIN 40 MILLIGRAM(S): 20 TABLET, FILM COATED ORAL at 21:46

## 2018-04-06 RX ADMIN — SODIUM CHLORIDE 80 MILLILITER(S): 9 INJECTION INTRAMUSCULAR; INTRAVENOUS; SUBCUTANEOUS at 15:56

## 2018-04-06 RX ADMIN — PANTOPRAZOLE SODIUM 40 MILLIGRAM(S): 20 TABLET, DELAYED RELEASE ORAL at 05:27

## 2018-04-06 NOTE — PROGRESS NOTE ADULT - ASSESSMENT
84 y/o male with h/o CAD s/p CABG, chronic A. fib (on coumadin), prior mitral valve clipping, HTN, HLD, CKD stage III, anemia of chronic disease, colonic AVM, DM2, SILVERIO, dementia, hiatal hernia was admitted on 3/28 for increased weakness s/p multiple falls and syncope. Pt had a recent syncopal episode on 3/27 for which she was evaluated in ER. Son reported another episode of syncope which lasted a few seconds. He then had 2 other episodes of near syncope which he felt weak but did not lose consciousness. On the evening PTA, the patient went to sleep downstairs on the couch and when the family woke up this morning he was found on the ground. No fever or chills reported at home. He was noted with left 2nd toe pain, redness and swelling.    1. Left 2nd toe cellulitis. Left foot cellulitis. Possible underlying OM. ?PVD. CRF stage 3. s/p urinary retention.  -BC x 2 are negative to date  -left foot and toe are improving slowly  -on vancomycin 500 mg IV q12h and ceftriaxone 1 gm IV qd # 4  -tolerating abx well so far; no side effects noted  -vancomycin trough level is therapeutic  -MRI left foot is poor study w/o contrast, but no OM noted  -would continue therapy with doxycycline 100 mg PO q12h for 6 weeks  -f/u with podiatry as outpatient to monitor left foot and toe  -monitor temps  -f/u CBC  -supportive care  2. Other issues:   -care per medicine

## 2018-04-06 NOTE — CONSULT NOTE ADULT - CONSULT REQUESTED BY NAME
Dr. Castrejon
Dr. Castrejon
Dr. Momin
Hospitalist/ Dr ALIVIA Momin
Kaylah Momin MD
matthew dominguez
VAnte

## 2018-04-06 NOTE — CONSULT NOTE ADULT - ASSESSMENT
84 yo M with BPH, urinary retention off BPH meds, hematuria following ghohs catheter placement with initial inflation of balloon in prostate. Hematuria resolved.   - resume anticoagulation, observe for return of hematuria  - flomax, finasteride  - dc home with ghosh catheter to leg bag, can attempt trial of void after 1-2 weeks of flomax with myself or Dr Carvajal, pts urologist

## 2018-04-06 NOTE — PROVIDER CONTACT NOTE (OTHER) - SITUATION
Dr. Donnelly's office notified about pt.
Dr. Rogers' office notified about pt.
Service called for am consult
Consult called to hematology service.
H+H 7.9/23.5
Pt has gross hematuria in Osborne.
Spoke to Podiatry Resident Dr. Lee.

## 2018-04-06 NOTE — PROGRESS NOTE ADULT - SUBJECTIVE AND OBJECTIVE BOX
Patient is a 85y old  Male who presents with a chief complaint of syncope/fall (28 Mar 2018 17:53)    Date of service: 04-06-18 @ 12:51    OOB to chair  Denies left foot pain  No complaints    ROS: no fever or chills; denies dizziness, no HA, no SOB or cough, no abdominal pain, no diarrhea or constipation; no legs pain    MEDICATIONS  (STANDING):  cefTRIAXone Injectable.      cefTRIAXone Injectable. 1000 milliGRAM(s) IV Push every 24 hours  cholecalciferol 2000 Unit(s) Oral daily  dextrose 5%. 1000 milliLiter(s) (50 mL/Hr) IV Continuous <Continuous>  dextrose 50% Injectable 12.5 Gram(s) IV Push once  dextrose 50% Injectable 25 Gram(s) IV Push once  dextrose 50% Injectable 25 Gram(s) IV Push once  escitalopram 5 milliGRAM(s) Oral daily  finasteride 5 milliGRAM(s) Oral daily  fludroCORTISONE 0.1 milliGRAM(s) Oral daily  folic acid 1 milliGRAM(s) Oral daily  insulin lispro (HumaLOG) corrective regimen sliding scale   SubCutaneous three times a day before meals  insulin lispro (HumaLOG) corrective regimen sliding scale   SubCutaneous at bedtime  pantoprazole    Tablet 40 milliGRAM(s) Oral before breakfast  polyethylene glycol 3350 17 Gram(s) Oral daily  simvastatin 40 milliGRAM(s) Oral at bedtime  sitaGLIPtin 25 milliGRAM(s) Oral daily  tamsulosin 0.4 milliGRAM(s) Oral at bedtime  vancomycin  IVPB 500 milliGRAM(s) IV Intermittent every 12 hours  vancomycin  IVPB        MEDICATIONS  (PRN):  acetaminophen   Tablet. 650 milliGRAM(s) Oral every 6 hours PRN Mild Pain (1 - 3)  dextrose Gel 1 Dose(s) Oral once PRN Blood Glucose LESS THAN 70 milliGRAM(s)/deciliter  docusate sodium 100 milliGRAM(s) Oral two times a day PRN Constipation  glucagon  Injectable 1 milliGRAM(s) IntraMuscular once PRN Glucose LESS THAN 70 milligrams/deciliter  senna 2 Tablet(s) Oral at bedtime PRN Constipation      Vital Signs Last 24 Hrs  T(C): 36.3 (06 Apr 2018 11:19), Max: 37.2 (06 Apr 2018 04:12)  T(F): 97.4 (06 Apr 2018 11:19), Max: 99 (06 Apr 2018 04:12)  HR: 73 (06 Apr 2018 11:19) (62 - 78)  BP: 91/55 (06 Apr 2018 11:19) (91/55 - 151/79)  BP(mean): --  RR: 16 (06 Apr 2018 11:19) (16 - 18)  SpO2: 100% (06 Apr 2018 11:19) (98% - 100%)    Physical Exam:      Constitutional: frail looking  HEENT: NC/AT, EOMI, PERRLA  Neck: supple  Back: no tenderness  Respiratory: clear  Cardiovascular: S1S2 regular, no murmurs  Abdomen: soft, not tender, not distended, positive BS  Genitourinary: no LN palpable  Musculoskeletal: no muscle tenderness, no joint swelling or tenderness  Extremities: no pedal edema; left 2nd and 3rd toe erythema, edema, tenderness and warmths - improving slowly  Neurological: confused, moving all extremities  Skin: no rashes    Labs:                        8.5    5.15  )-----------( 157      ( 06 Apr 2018 07:13 )             25.6     04-05    141  |  108  |  44<H>  ----------------------------<  162<H>  4.2   |  27  |  1.19    Ca    8.6      05 Apr 2018 05:27    Vancomycin Level, Trough: 11.1 ug/mL (04-05 @ 05:27)                          9.6    6.63  )-----------( 156      ( 03 Apr 2018 06:24 )             27.8     04-03    140  |  107  |  49<H>  ----------------------------<  192<H>  4.6   |  28  |  1.36<H>    Ca    9.1      03 Apr 2018 06:24      Culture - Urine (collected 28 Mar 2018 14:42)  Source: .Urine None  Final Report (29 Mar 2018 23:11):    No growth        Radiology:    < from: Xray Foot AP + Lateral, Left (04.01.18 @ 15:55) >  No acute injury. Previous surgery of the first metatarsal   with placement of screw and degenerative change at the first MTP joint.    < end of copied text >    < from: CT Abdomen and Pelvis No Cont (04.01.18 @ 14:41) >  No retroperitoneal hemorrhage.  Osborne catheter inflated within the apex of the prostate gland.  Thickened urinary bladder with surrounding inflammatory changes,   correlate for cystitis, new from 3/28.  Otherwise no acute findings provided the limitation of a noncontrast exam.    < end of copied text >    < from: MR Foot No Cont, Left (04.04.18 @ 21:11) >  Markedly motion degraded exam. Severe dorsal soft tissue cellulitis   without evidence of abscess or osteomyelitis.    < end of copied text >    Advanced directives addressed: full resuscitation

## 2018-04-06 NOTE — CONSULT NOTE ADULT - CONSULT REASON
Anemia
Dementia, depression
Evaluation of renal dysfunction.
L 2nd toe pain, redness and swelling
abx management
urinary retention, gross hematuria after catheter placement
syncope

## 2018-04-06 NOTE — PROGRESS NOTE ADULT - ASSESSMENT
86 yo male with extensive h/o CAD s/p CABG, chronic a. fib (on coumadin), h/o Mitral valve clipping, HTN, HLD, CKD stage III, Anemia of chronic disease, h/o colonic AVM, DM2, SILVERIO, dementia, hiatal hernia, TIA is seen and evaluated for:    1. Cellulitis vs Acute trauma at second digit, left foot, improving   2. Pain 2nd digit, left foot  3. DM  4. PVD b/l     Plan:  Pt is seen and evaluated with attending Dr. Jones   MRI left foot reviewed   Dispensed surgical shoe and instructed pt to ambulate with surgical shoe to the left foot  Dispensed Z-flex offloading boot b/l to offload the heels   IV abx per ID, appreciated   Medical management per medicine, appreciated   Podiatry will continue to follow while in house 86 yo male with extensive h/o CAD s/p CABG, chronic a. fib (on coumadin), h/o Mitral valve clipping, HTN, HLD, CKD stage III, Anemia of chronic disease, h/o colonic AVM, DM2, SILVERIO, dementia, hiatal hernia, TIA is seen and evaluated for:    1. Cellulitis vs Acute trauma at second digit, left foot, improving   2. Pain 2nd digit, left foot  3. DM  4. PVD b/l     Plan:  Pt is seen and evaluated with attending Dr. Lerma    MRI left foot reviewed   Dispensed surgical shoe and instructed pt to ambulate with surgical shoe to the left foot  Dispensed Z-flex offloading boot b/l to offload the heels   IV abx per ID, appreciated   Medical management per medicine, appreciated   Podiatry will continue to follow while in house

## 2018-04-06 NOTE — PROGRESS NOTE ADULT - SUBJECTIVE AND OBJECTIVE BOX
Date of Service 04-06-18     86 yo male with extensive h/o CAD s/p CABG, chronic a. fib (on coumadin), h/o Mitral valve clipping, HTN, HLD, CKD stage III, Anemia of chronic disease, h/o colonic AVM, DM2, SILVERIO, dementia, hiatal hernia, TIA is seen at bedside for followup care of left 2nd toe pain, redness and swelling. Pt reports no pain or discomfort in his lower extremity including all toes upon resting. Pt states he still has pain at his left 2nd toe upon touching or pressure to it. Pt denies fever, chills, nausea, vomiting, SOB, chest pain.     PMH: as above  Allergies: No Known Allergies    MEDICATIONS  (STANDING):  cefTRIAXone Injectable.      cefTRIAXone Injectable. 1000 milliGRAM(s) IV Push every 24 hours  cholecalciferol 2000 Unit(s) Oral daily  dextrose 5%. 1000 milliLiter(s) (50 mL/Hr) IV Continuous <Continuous>  dextrose 50% Injectable 12.5 Gram(s) IV Push once  dextrose 50% Injectable 25 Gram(s) IV Push once  dextrose 50% Injectable 25 Gram(s) IV Push once  escitalopram 5 milliGRAM(s) Oral daily  finasteride 5 milliGRAM(s) Oral daily  fludroCORTISONE 0.1 milliGRAM(s) Oral daily  folic acid 1 milliGRAM(s) Oral daily  insulin lispro (HumaLOG) corrective regimen sliding scale   SubCutaneous three times a day before meals  insulin lispro (HumaLOG) corrective regimen sliding scale   SubCutaneous at bedtime  pantoprazole    Tablet 40 milliGRAM(s) Oral before breakfast  polyethylene glycol 3350 17 Gram(s) Oral daily  simvastatin 40 milliGRAM(s) Oral at bedtime  sitaGLIPtin 25 milliGRAM(s) Oral daily  tamsulosin 0.4 milliGRAM(s) Oral at bedtime  vancomycin  IVPB 500 milliGRAM(s) IV Intermittent every 12 hours  vancomycin  IVPB        MEDICATIONS  (PRN):  acetaminophen   Tablet. 650 milliGRAM(s) Oral every 6 hours PRN Mild Pain (1 - 3)  dextrose Gel 1 Dose(s) Oral once PRN Blood Glucose LESS THAN 70 milliGRAM(s)/deciliter  docusate sodium 100 milliGRAM(s) Oral two times a day PRN Constipation  glucagon  Injectable 1 milliGRAM(s) IntraMuscular once PRN Glucose LESS THAN 70 milligrams/deciliter  senna 2 Tablet(s) Oral at bedtime PRN Constipation    Vital Signs Last 24 Hrs  T(C): 37.2 (06 Apr 2018 04:12), Max: 37.2 (06 Apr 2018 04:12)  T(F): 99 (06 Apr 2018 04:12), Max: 99 (06 Apr 2018 04:12)  HR: 78 (06 Apr 2018 04:12) (62 - 78)  BP: 145/68 (06 Apr 2018 04:12) (103/53 - 151/79)  BP(mean): --  RR: 18 (06 Apr 2018 04:12) (18 - 18)  SpO2: 99% (06 Apr 2018 04:12) (98% - 100%)    PHYSICAL EXAM:    Constitutional: NAD, awake and alert  Lower Extremity Exam:  Left lower extremity is larger and more edematous than the right   Derm:  Left foot: erythema and edema circumscribed the 2nd toe extending around the dorsal 2nd MPJ (improving, receding to toe only with less erythema and edema). Diffused dry skin. No open lesion, no malodor, no fluctuance, no purulence, no active drainage. No interdigital maceration or fissure. Nails are trimmed.   Right foot: Diffused dry skin. No open lesion, no malodor, no fluctuance, no purulence, no active drainage, no erythema, no clinical signs of infection. No interdigital maceration or fissure. Nails are trimmed.   Vascular: Left 2nd toe is warmer than the rest of the toes. Pedal pulses palpable 1/4 DP and PT on the right. Non-palpable DP and PT on the L. Dorsal pitting edema (L>R, improving). Pedal hair absent b/l.   Neuro: was not able to assess  Ortho: pain upon palpation of the left 2nd toe and left 2nd MPJ ROM. No pain upon palpation and ROM of the remaining of the left foot and the right foot including toes, STJ, and ankle ROM. Not able to fully assess during this visit.     LABS: All Labs Reviewed:                        8.5    5.15  )-----------( 157      ( 06 Apr 2018 07:13 )             25.6     04-05    141  |  108  |  44<H>  ----------------------------<  162<H>  4.2   |  27  |  1.19    Ca    8.6      05 Apr 2018 05:27                    .Urine None  03-28 @ 14:42   No growth  --  --    RADIOLOGY:    < from: Xray Foot AP + Lateral, Left (04.01.18 @ 15:55) >    EXAM:  XR FOOT-LEFT-2 VIEWS                            PROCEDURE DATE:  04/01/2018          INTERPRETATION:  Radiographs of the left foot         CLINICAL INFORMATION:   Foot pain.    TECHNIQUE:  Frontal, oblique and lateral views of the foot were obtained.    FINDINGS:   No prior similar studies are available for review.    There is degenerative change at the first MTP joint with a screw within   the distal first metatarsal.    The hindfoot appears intact.  No significant spur formation is   recognized.  The soft tissues appear intact.    Atherosclerotic vascular calcifications are noted.    IMPRESSION: No acute injury. Previous surgery of the first metatarsal   with placement of screw and degenerative change at the first MTP joint.    < from: MR Foot No Cont, Left (04.04.18 @ 21:11) >  EXAM:  MR FOOT LT                            PROCEDURE DATE:  04/04/2018          INTERPRETATION:      MRI OF THE LEFT FOOT:    CLINICAL INDICATION: Left foot pain with clinical concern for   osteomyelitis.    TECHNIQUE: Multiplanar, multi- sequential MRI of the left left foot was   performed without contrast. Only sagittal T1 and inversion recovery   sequences as well as axial T1 sequence could be obtained. The study is   limited by significant post artifact. Comparisons with the prior left   foot radiographs dated/1/2018    IMPRESSION:    Motion degraded images demonstrate evidence of prior screw fixation of   the distal first metatarsal for bunion correction surgery. There is   severe first MTP joint arthrosis. There is severe diffuse subcutaneous   soft tissue edema dorsally consistent with cellulitis. There is no   organized fluid collection or abscess is identified.    No definite marrow signal abnormality identified to suggest osteomyelitis.    IMPRESSION:    Markedly motion degraded exam. Severe dorsal soft tissue cellulitis   without evidence of abscess or osteomyelitis.

## 2018-04-06 NOTE — PROVIDER CONTACT NOTE (OTHER) - DATE AND TIME:
06-Apr-2018 06:23
28-Mar-2018 22:48
02-Apr-2018 10:38
02-Apr-2018 22:32
04-Apr-2018 18:30
28-Mar-2018 22:52
31-Mar-2018 15:07
31-Mar-2018 15:10

## 2018-04-06 NOTE — PROVIDER CONTACT NOTE (OTHER) - NAME OF MD/NP/PA/DO NOTIFIED:
Dr Blanco
Consult called in for Dr. Garcia. EVERARDO Blankenship at service
Dr. Angulo
Dr. Forbes
Dr. Jones
Dr. Lee

## 2018-04-06 NOTE — PROGRESS NOTE ADULT - SUBJECTIVE AND OBJECTIVE BOX
CHIEF COMPLAINT/Diagnosis: left foot cellulitis/ traumatic ghosh inserted after patient could not void/ hematuria    SUBJECTIVE: no complaints    REVIEW OF SYSTEMS:    CONSTITUTIONAL: No weakness, fevers or chills  EYES/ENT: No visual changes;  No vertigo or throat pain   NECK: No pain or stiffness  RESPIRATORY: No cough, wheezing, hemoptysis; No shortness of breath  CARDIOVASCULAR: No chest pain or palpitations  GASTROINTESTINAL: No abdominal or epigastric pain. No nausea, vomiting, or hematemesis; No diarrhea or constipation. No melena or hematochezia.  GENITOURINARY: No dysuria, frequency or hematuria  NEUROLOGICAL: No numbness or weakness  SKIN: No itching, burning, rashes, or lesions   All other review of systems is negative unless indicated above    Vital Signs Last 24 Hrs  T(C): 36.3 (06 Apr 2018 11:19), Max: 37.2 (06 Apr 2018 04:12)  T(F): 97.4 (06 Apr 2018 11:19), Max: 99 (06 Apr 2018 04:12)  HR: 73 (06 Apr 2018 11:19) (62 - 78)  BP: 91/55 (06 Apr 2018 11:19) (91/55 - 151/79)  BP(mean): --  RR: 16 (06 Apr 2018 11:19) (16 - 18)  SpO2: 100% (06 Apr 2018 11:19) (98% - 100%)    I&O's Summary    05 Apr 2018 07:01  -  06 Apr 2018 07:00  --------------------------------------------------------  IN: 0 mL / OUT: 1000 mL / NET: -1000 mL        CAPILLARY BLOOD GLUCOSE      POCT Blood Glucose.: 237 mg/dL (06 Apr 2018 12:34)  POCT Blood Glucose.: 240 mg/dL (06 Apr 2018 08:04)  POCT Blood Glucose.: 187 mg/dL (05 Apr 2018 21:31)  POCT Blood Glucose.: 235 mg/dL (05 Apr 2018 17:09)      PHYSICAL EXAM:    Constitutional: NAD, awake and alert, well-developed  HEENT: PERR, EOMI, Normal Hearing, MMM  Neck: Soft and supple, No LAD, No JVD  Respiratory: Breath sounds are clear bilaterally, No wheezing, rales or rhonchi  Cardiovascular: S1 and S2, regular rate and rhythm, no Murmurs, gallops or rubs  Gastrointestinal: Bowel Sounds present, soft, nontender, nondistended, no guarding, no rebound  Extremities: No peripheral edema  Vascular: 2+ peripheral pulses  Neurological: A/O x 3, no focal deficits  Musculoskeletal: 5/5 strength b/l upper and lower extremities  Skin: No rashes    MEDICATIONS:  MEDICATIONS  (STANDING):  cefTRIAXone Injectable.      cefTRIAXone Injectable. 1000 milliGRAM(s) IV Push every 24 hours  cholecalciferol 2000 Unit(s) Oral daily  dextrose 5%. 1000 milliLiter(s) (50 mL/Hr) IV Continuous <Continuous>  dextrose 50% Injectable 12.5 Gram(s) IV Push once  dextrose 50% Injectable 25 Gram(s) IV Push once  dextrose 50% Injectable 25 Gram(s) IV Push once  escitalopram 5 milliGRAM(s) Oral daily  finasteride 5 milliGRAM(s) Oral daily  fludroCORTISONE 0.1 milliGRAM(s) Oral daily  folic acid 1 milliGRAM(s) Oral daily  insulin lispro (HumaLOG) corrective regimen sliding scale   SubCutaneous three times a day before meals  insulin lispro (HumaLOG) corrective regimen sliding scale   SubCutaneous at bedtime  lactobacillus acidophilus 1 Tablet(s) Oral three times a day with meals  pantoprazole    Tablet 40 milliGRAM(s) Oral before breakfast  polyethylene glycol 3350 17 Gram(s) Oral daily  simvastatin 40 milliGRAM(s) Oral at bedtime  sitaGLIPtin 25 milliGRAM(s) Oral daily  sodium chloride 0.9%. 1000 milliLiter(s) (80 mL/Hr) IV Continuous <Continuous>  tamsulosin 0.4 milliGRAM(s) Oral at bedtime  vancomycin  IVPB 500 milliGRAM(s) IV Intermittent every 12 hours  vancomycin  IVPB          LABS: All Labs Reviewed:                        8.5    5.15  )-----------( 157      ( 06 Apr 2018 07:13 )             25.6     04-05    141  |  108  |  44<H>  ----------------------------<  162<H>  4.2   |  27  |  1.19    Ca    8.6      05 Apr 2018 05:27      PT/INR - ( 05 Apr 2018 05:27 )   PT: 14.1 sec;   INR: 1.30 ratio         Assessment and Plan      This is an 84 yo male with extensive PMH of CAD s/p CABG, chronic a. fib (on coumadin), h/o Mitral valve clipping, HTN, HLD, CKD stage III, Anemia of chronic disease, h/o colonic AVM, DM2, SILVERIO, dementia, hiatal hernia presents to ED s/p multiple falls and syncope. Pt poor historian secondary to dementia and history obtained from family at bedside. Pt presented to ED on 3/27 after episode of syncope as per family and hit the back of his head on the ground. He was found to have a head laceration which was repaired with staples. CT head at the time was negative and pt was discharged home. While patient was walking into his house he had another episode of syncope which lasted a few seconds as per son. He did not hit his head this time. He then had 2 other episodes of near syncope which he felt weak but did not lose consciousness. He went to sleep downstairs on the couch and when the family woke up this morning he was found on the ground.     Hospital course:     1-Patient admitted and underwent treatment for depression (eval by Psych and started on Lexapro).     2-Left foot cellulitis noted and MRI poor study thus decision made w/ ID to treat for probable OM .   -ID onboard, recco oral doxy for 6 weeks.    3-Urinary retention with insertion of traumatic ghosh  -spoke with daughter today; says that he was placed on BPH meds, but they were discontinued because family felt that maybe it was contributing to his depression?  -patient has been off BPH meds till now  -urology consulted for hematuria  -c/w finesteride and flomax    4-Falls: frequent falls  [facility]    5-dvt proph- coumadin likely tonight    6-afib  -hematuria has resolved  -resetart coumadin tonioght

## 2018-04-06 NOTE — CONSULT NOTE ADULT - CONSULT REQUESTED DATE/TIME
01-Apr-2018 11:59
02-Apr-2018 11:38
03-Apr-2018 09:30
03-Apr-2018 12:28
06-Apr-2018 18:59
29-Mar-2018 12:30
29-Mar-2018 08:30

## 2018-04-06 NOTE — CONSULT NOTE ADULT - SUBJECTIVE AND OBJECTIVE BOX
History of Present Illness:  Reason for Admission: syncope/fall	  History of Present Illness: 	  84 yo male with extensive PMH of CAD s/p CABG, chronic a. fib (on coumadin), h/o Mitral valve clipping, HTN, HLD, CKD stage III, Anemia of chronic disease, h/o colonic AVM, DM2, SILVERIO, dementia, hiatal hernia presents to ED s/p multiple falls and syncope. Pt poor historian secondary to dementia and history obtained from family at bedside. Pt presented to ED on 3/27 after episode of syncope as per family and hit the back of his head on the ground. He was found to have a head laceration which was repaired with staples. CT head at the time was negative and pt was discharged home. While patient was walking into his house he had another episode of syncope which lasted a few seconds as per son. He did not hit his head this time. He then had 2 other episodes of near syncope which he felt weak but did not lose consciousness. He went to sleep downstairs on the couch and when the family woke up this morning he was found on the ground. Currently pt resting comfortably and is pleasantly confused. A+Ox2.  Denies any fevers, chills, headaches, dizziness, palpitations, chest pain, SOB, abd pain, N/V, diarrhea.     Pt had urinary retention, ghosh catheter was placed with subsequent hematuria. CT showed ghosh inflated in prostate. Failed trial of void, and ghosh replaced. Of note, pt had been on dual therapy with flomax and finasteride but family dc'd due to concerns of depression. Pt has undergone two procedures for his prostate in past, description fits TURP or HOLEP, prostate resection procedures for BPH.     Review of Systems:  Other Review of Systems: All other review of systems negative, except as noted in HPI	      Allergies and Intolerances:        Allergies:  	No Known Allergies:     Home Medications:   * Patient Currently Takes Medications as of 28-Mar-2018 15:35 documented in Structured Notes  · 	Vitamin D3 2000 intl units oral tablet: 1 tab(s) orally once a day (in the morning), Last Dose Taken:    · 	Co Q-10 100 mg oral capsule: 1 cap(s) orally once a day (in the morning), Last Dose Taken:    · 	warfarin 4 mg oral tablet: 1 tab(s) orally once a day (at bedtime)  	  	**Patient's wife states drug was stopped since yesterday**, Last Dose Taken: 27-Mar-2018 PM  · 	torsemide 20 mg oral tablet: 1 tab(s) orally Monday, Wednesday, and Friday, Last Dose Taken:    · 	Januvia 25 mg oral tablet: 1 tab(s) orally once a day (in the morning), Last Dose Taken:    · 	simvastatin 40 mg oral tablet: 1 tab(s) orally once a day (at bedtime), Last Dose Taken:    · 	pantoprazole 40 mg oral delayed release tablet: 1 tab(s) orally once a day (in the morning), Last Dose Taken:      .    Patient History:   Past Medical History:  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    BPH (benign prostatic hyperplasia)    CAD (coronary artery disease)  S/P CABG  Chronic atrial fibrillation    CKD (chronic kidney disease)    DM (diabetes mellitus screen)    Hearing loss    Hiatal hernia    HLD (hyperlipidemia)    HTN (hypertension)    Memory loss, short term    MR (mitral regurgitation)    SILVERIO (obstructive sleep apnea)  on CPAP  TIA (transient ischemic attack)    Transfusion history    Weight loss.    Past Surgical History:  H/O cystoscopy  TURP  H/O heart surgery  Clip  History of hernia surgery    History of tonsillectomy    S/P CABG x 4.    Family History:  Father  Still living? No  Family history of myocardial infarction, Age at diagnosis: Age Unknown.    Social History:  Social History (marital status, living situation, occupation, tobacco use, alcohol and drug use, and sexual history): denies tobacco, etoh or drug use	      Physical Exam:  Physical Exam:  Vital Signs Last 24 Hrs T(C): 36.7 (06 Apr 2018 17:11), Max: 37.2 (06 Apr 2018 04:12) T(F): 98 (06 Apr 2018 17:11), Max: 99 (06 Apr 2018 04:12) HR: 70 (06 Apr 2018 17:11) (66 - 78) BP: 101/54 (06 Apr 2018 17:11) (91/55 - 151/79) BP(mean): -- RR: 18 (06 Apr 2018 17:11) (16 - 18) SpO2: 98% (06 Apr 2018 17:11) (98% - 100%)	    Physical Exam:  · Constitutional	detailed exam	  · Constitutional Details	well-groomed; no distress	  · Eyes	EOMI; PERRL; no drainage or redness	  · Neck	No bruits; no thyromegaly or nodules	  · Respiratory	Breath Sounds equal & clear to percussion & auscultation, no accessory muscle use	  · Cardiovascular	detailed exam	  · Cardiovascular Details	regular rate and rhythm	  · Gastrointestinal	Soft, non-tender, no hepatosplenomegaly, normal bowel sounds	  · Extremities	No cyanosis, clubbing or edema	  · Genitourinary	Ghosh catether draining yousif urine, scant clots	  · Mental Status	A+Ox2	  · Musculoskeletal	No joint pain, swelling or deformity; no limitation of movement	  · Psychiatric	Affect and characteristics of appearance, verbalizations, behaviors are appropriate	      Laboratory:                         8.5    5.15  )-----------( 157      ( 06 Apr 2018 07:13 )             25.6     04-05    141  |  108  |  44<H>  ----------------------------<  162<H>  4.2   |  27  |  1.19    Ca    8.6      05 Apr 2018 05:27    < from: CT Abdomen and Pelvis No Cont (04.01.18 @ 14:41) >    EXAM:  CT ABDOMEN AND PELVIS                            PROCEDURE DATE:  04/01/2018          INTERPRETATION:  Clinical information: Anemia and syncope, abdominal pain    Comparison: 3/20    PROCEDURE:     CT of the Abdomen and Pelvis was performed without intravenous contrast.    Evaluation of abdominal and pelvic viscera, lymph nodes and vasculature   is limited without intravenous contrast.    FINDINGS:    LOWER CHEST: Large left hiatal hernia with intrathoracic stomach, grossly   stable. Cardiomegaly. Small left pleural effusion.    ABDOMEN:  LIVER: within normal limits  BILE DUCTS: normal caliber  GALLBLADDER: Cholelithiasis  PANCREAS: within normal limits  SPLEEN: within normal limits  ADRENALS: within normal limits  KIDNEYS: Mild lefthydronephrosis. 1.5 cm left renal lesion,   indeterminate as previously noted and corresponding to a complex cyst.    PELVIS:  REPRODUCTIVE ORGANS: Enlarged prostate. Ghosh catheter inflated within   the apex of the prostate.  BLADDER: Thickened withextensive perivesical inflammatory changes. The   wall is trabeculated.    BOWEL: Scattered diverticulosis. Large amount of stool in the colon. No   bowel obstruction.  PERITONEUM: no ascites or free air, no fluid collection  RETROPERITONEUM: no enlarged retroperitoneal or pelvic nodes. No   hemorrhage.    VESSELS: Extensive arterial calcifications.  ABDOMINAL WALL: Right inguinal hernia repair.  MUSCULOSKELETAL: within normal limits.    IMPRESSION:     No retroperitoneal hemorrhage.    Ghosh catheter inflated within the apex of the prostate gland.    Thickened urinary bladder with surrounding inflammatory changes,   correlate for cystitis, new from 3/28.    Otherwise no acute findings provided the limitation of a noncontrast exam.    Findings were discussed with Physician: COLTON PATIÑO [id number] with   a read back at 3:18 PM.                        TEENA TYSON   This document has been electronically signed. Apr 1 2018  3:18PM                < end of copied text >

## 2018-04-07 VITALS
HEART RATE: 72 BPM | DIASTOLIC BLOOD PRESSURE: 66 MMHG | RESPIRATION RATE: 20 BRPM | OXYGEN SATURATION: 99 % | SYSTOLIC BLOOD PRESSURE: 126 MMHG | TEMPERATURE: 99 F

## 2018-04-07 LAB
ANION GAP SERPL CALC-SCNC: 6 MMOL/L — SIGNIFICANT CHANGE UP (ref 5–17)
BUN SERPL-MCNC: 56 MG/DL — HIGH (ref 7–23)
CALCIUM SERPL-MCNC: 8.4 MG/DL — LOW (ref 8.5–10.1)
CHLORIDE SERPL-SCNC: 107 MMOL/L — SIGNIFICANT CHANGE UP (ref 96–108)
CO2 SERPL-SCNC: 26 MMOL/L — SIGNIFICANT CHANGE UP (ref 22–31)
CREAT SERPL-MCNC: 1.51 MG/DL — HIGH (ref 0.5–1.3)
GLUCOSE BLDC GLUCOMTR-MCNC: 225 MG/DL — HIGH (ref 70–99)
GLUCOSE BLDC GLUCOMTR-MCNC: 267 MG/DL — HIGH (ref 70–99)
GLUCOSE SERPL-MCNC: 258 MG/DL — HIGH (ref 70–99)
HCT VFR BLD CALC: 24.8 % — LOW (ref 39–50)
HGB BLD-MCNC: 8.1 G/DL — LOW (ref 13–17)
MCHC RBC-ENTMCNC: 32.7 GM/DL — SIGNIFICANT CHANGE UP (ref 32–36)
MCHC RBC-ENTMCNC: 33.2 PG — SIGNIFICANT CHANGE UP (ref 27–34)
MCV RBC AUTO: 101.6 FL — HIGH (ref 80–100)
NRBC # BLD: 0 /100 WBCS — SIGNIFICANT CHANGE UP (ref 0–0)
PLATELET # BLD AUTO: 155 K/UL — SIGNIFICANT CHANGE UP (ref 150–400)
POTASSIUM SERPL-MCNC: 4.6 MMOL/L — SIGNIFICANT CHANGE UP (ref 3.5–5.3)
POTASSIUM SERPL-SCNC: 4.6 MMOL/L — SIGNIFICANT CHANGE UP (ref 3.5–5.3)
RBC # BLD: 2.44 M/UL — LOW (ref 4.2–5.8)
RBC # FLD: 15.4 % — HIGH (ref 10.3–14.5)
SODIUM SERPL-SCNC: 139 MMOL/L — SIGNIFICANT CHANGE UP (ref 135–145)
WBC # BLD: 7.03 K/UL — SIGNIFICANT CHANGE UP (ref 3.8–10.5)
WBC # FLD AUTO: 7.03 K/UL — SIGNIFICANT CHANGE UP (ref 3.8–10.5)

## 2018-04-07 RX ADMIN — Medication 1 TABLET(S): at 11:57

## 2018-04-07 RX ADMIN — SODIUM CHLORIDE 80 MILLILITER(S): 9 INJECTION INTRAMUSCULAR; INTRAVENOUS; SUBCUTANEOUS at 06:02

## 2018-04-07 RX ADMIN — Medication 100 MILLIGRAM(S): at 06:00

## 2018-04-07 RX ADMIN — POLYETHYLENE GLYCOL 3350 17 GRAM(S): 17 POWDER, FOR SOLUTION ORAL at 11:54

## 2018-04-07 RX ADMIN — Medication 2000 UNIT(S): at 11:53

## 2018-04-07 RX ADMIN — FINASTERIDE 5 MILLIGRAM(S): 5 TABLET, FILM COATED ORAL at 11:54

## 2018-04-07 RX ADMIN — ESCITALOPRAM OXALATE 5 MILLIGRAM(S): 10 TABLET, FILM COATED ORAL at 11:54

## 2018-04-07 RX ADMIN — PANTOPRAZOLE SODIUM 40 MILLIGRAM(S): 20 TABLET, DELAYED RELEASE ORAL at 06:01

## 2018-04-07 RX ADMIN — Medication 1 TABLET(S): at 08:09

## 2018-04-07 RX ADMIN — CEFTRIAXONE 1000 MILLIGRAM(S): 500 INJECTION, POWDER, FOR SOLUTION INTRAMUSCULAR; INTRAVENOUS at 11:55

## 2018-04-07 RX ADMIN — Medication 2: at 11:57

## 2018-04-07 RX ADMIN — FLUDROCORTISONE ACETATE 0.1 MILLIGRAM(S): 0.1 TABLET ORAL at 06:01

## 2018-04-07 RX ADMIN — Medication 3: at 08:09

## 2018-04-07 RX ADMIN — Medication 1 MILLIGRAM(S): at 11:54

## 2018-04-07 NOTE — PROGRESS NOTE ADULT - ASSESSMENT
84 yo male with extensive h/o CAD s/p CABG, chronic a. fib (on coumadin), h/o Mitral valve clipping, HTN, HLD, CKD stage III, Anemia of chronic disease, h/o colonic AVM, DM2, SILVERIO, dementia, hiatal hernia, TIA is seen and evaluated for:    1. Cellulitis vs Acute trauma at second digit, left foot, improving   2. Pain 2nd digit, left foot  3. DMII  4. PVD b/l     Plan:  Pt is seen and evaluated; d/w attending Dr. Jones  Xray and MRI imagin left foot reviewed   Cont. surgical shoe and instructed pt to ambulate with surgical shoe to the left foot  Cont. Z-flex offloading boot b/l to offload the heels   Abx per ID, appreciated   Medical management per medicine, appreciated   Pt stable for d/c from Podiatry standpoint; will f/u in the office within 1 week with Dr. Jones  Podiatry will continue to follow while in house

## 2018-04-07 NOTE — PROGRESS NOTE ADULT - ASSESSMENT
86 y/o male with h/o CAD s/p CABG, chronic A. fib (on coumadin), prior mitral valve clipping, HTN, HLD, CKD stage III, anemia of chronic disease, colonic AVM, DM2, SILVERIO, dementia, hiatal hernia was admitted on 3/28 for increased weakness s/p multiple falls and syncope. Pt had a recent syncopal episode on 3/27 for which she was evaluated in ER. Son reported another episode of syncope which lasted a few seconds. He then had 2 other episodes of near syncope which he felt weak but did not lose consciousness. On the evening PTA, the patient went to sleep downstairs on the couch and when the family woke up this morning he was found on the ground. No fever or chills reported at home. He was noted with left 2nd toe pain, redness and swelling.    1. Left 2nd toe cellulitis. Left foot cellulitis. Possible underlying OM. ?PVD. CRF stage 3. s/p urinary retention.  -BC x 2 are negative to date  -left foot and toe are improving slowly  -on vancomycin 500 mg IV q12h and ceftriaxone 1 gm IV qd # 5  -tolerating abx well so far; no side effects noted  -vancomycin trough level is therapeutic  -change abx to doxycycline 100 mg PO q12h for 6 weeks  -f/u with podiatry as outpatient to monitor left foot and toe  -monitor temps  -f/u CBC  -supportive care  2. Other issues:   -care per medicine

## 2018-04-07 NOTE — PROGRESS NOTE ADULT - SUBJECTIVE AND OBJECTIVE BOX
Date of Service 04-07-18     84 yo male with extensive h/o CAD s/p CABG, chronic a. fib (on coumadin), h/o Mitral valve clipping, HTN, HLD, CKD stage III, Anemia of chronic disease, h/o colonic AVM, DM2, SILVERIO, dementia, hiatal hernia, TIA is seen at bedside for followup care of left 2nd toe pain, redness and swelling. Pt reports no pain or discomfort in his lower extremity including all toes upon resting.  Pt denies N/V/F/C/SOB/CP/Abd pain/Diarrhea/Headaches. Pt states no let foot 2nd toe pain at rest.    Allergies: No Known Allergies    MEDICATIONS  (STANDING):  cefTRIAXone Injectable.      cefTRIAXone Injectable. 1000 milliGRAM(s) IV Push every 24 hours  cholecalciferol 2000 Unit(s) Oral daily  dextrose 5%. 1000 milliLiter(s) (50 mL/Hr) IV Continuous <Continuous>  dextrose 50% Injectable 12.5 Gram(s) IV Push once  dextrose 50% Injectable 25 Gram(s) IV Push once  dextrose 50% Injectable 25 Gram(s) IV Push once  escitalopram 5 milliGRAM(s) Oral daily  finasteride 5 milliGRAM(s) Oral daily  fludroCORTISONE 0.1 milliGRAM(s) Oral daily  folic acid 1 milliGRAM(s) Oral daily  insulin lispro (HumaLOG) corrective regimen sliding scale   SubCutaneous three times a day before meals  insulin lispro (HumaLOG) corrective regimen sliding scale   SubCutaneous at bedtime  lactobacillus acidophilus 1 Tablet(s) Oral three times a day with meals  pantoprazole    Tablet 40 milliGRAM(s) Oral before breakfast  polyethylene glycol 3350 17 Gram(s) Oral daily  simvastatin 40 milliGRAM(s) Oral at bedtime  sitaGLIPtin 25 milliGRAM(s) Oral daily  sodium chloride 0.9%. 1000 milliLiter(s) (80 mL/Hr) IV Continuous <Continuous>  tamsulosin 0.4 milliGRAM(s) Oral at bedtime  vancomycin  IVPB 500 milliGRAM(s) IV Intermittent every 12 hours  vancomycin  IVPB        MEDICATIONS  (PRN):  acetaminophen   Tablet. 650 milliGRAM(s) Oral every 6 hours PRN Mild Pain (1 - 3)  dextrose Gel 1 Dose(s) Oral once PRN Blood Glucose LESS THAN 70 milliGRAM(s)/deciliter  docusate sodium 100 milliGRAM(s) Oral two times a day PRN Constipation  glucagon  Injectable 1 milliGRAM(s) IntraMuscular once PRN Glucose LESS THAN 70 milligrams/deciliter  senna 2 Tablet(s) Oral at bedtime PRN Constipation      Vital Signs Last 24 Hrs  T(C): 37.3 (07 Apr 2018 12:59), Max: 37.3 (07 Apr 2018 12:59)  T(F): 99.2 (07 Apr 2018 12:59), Max: 99.2 (07 Apr 2018 12:59)  HR: 72 (07 Apr 2018 12:59) (63 - 80)  BP: 126/66 (07 Apr 2018 12:59) (101/54 - 152/70)  BP(mean): --  RR: 20 (07 Apr 2018 12:59) (18 - 20)  SpO2: 99% (07 Apr 2018 12:59) (98% - 99%)      PHYSICAL EXAM:  Constitutional: NAD, awake and alert  Lower Extremity Exam:  Left lower extremity is larger and more edematous than the right   Derm:  Left foot: erythema and edema circumscribed the 2nd toe extending around the dorsal 2nd MPJ (improving, receding to toe only with less erythema and edema). Diffused dry skin. No open lesion, no malodor, no fluctuance, no purulence, no active drainage. No interdigital maceration or fissure. Nails are discolored, dystrophic, trimmed.   Right foot: Diffused dry skin. No open lesion, no malodor, no fluctuance, no purulence, no active drainage, no erythema, no clinical signs of infection. No interdigital maceration or fissure. Nails are discolored, dystrophic, trimmed.   Vascular: Left 2nd toe is warmer compared to the rest of the toes. Pedal pulses palpable 1/4 DP and PT on the right. Non-palpable DP and PT on the L. Dorsal pitting edema (L>R, improving). Pedal hair absent b/l.   Neuro: was not able to assess  Ortho: pain upon palpation of the left 2nd toe and left 2nd MPJ ROM. No pain upon palpation and ROM of the remaining of the left foot and the right foot including toes, STJ, and ankle ROM. Not able to fully assess during this visit.     LABS:     (04-07 @ 07:49)                      8.1  7.03 )-----------( 155                 24.8    Neutrophils = -- (--%)  Lymphocytes = -- (--%)  Eosinophils = -- (--%)  Basophils = -- (--%)  Monocytes = -- (--%)  Bands = --%    04-07    139  |  107  |  56<H>  ----------------------------<  258<H>  4.6   |  26  |  1.51<H>    Ca    8.4<L>      07 Apr 2018 07:49                  .Urine None  03-28 @ 14:42   No growth  --  --    RADIOLOGY:    < from: Xray Foot AP + Lateral, Left (04.01.18 @ 15:55) >    EXAM:  XR FOOT-LEFT-2 VIEWS                            PROCEDURE DATE:  04/01/2018          INTERPRETATION:  Radiographs of the left foot         CLINICAL INFORMATION:   Foot pain.    TECHNIQUE:  Frontal, oblique and lateral views of the foot were obtained.    FINDINGS:   No prior similar studies are available for review.    There is degenerative change at the first MTP joint with a screw within   the distal first metatarsal.    The hindfoot appears intact.  No significant spur formation is   recognized.  The soft tissues appear intact.    Atherosclerotic vascular calcifications are noted.    IMPRESSION: No acute injury. Previous surgery of the first metatarsal   with placement of screw and degenerative change at the first MTP joint.    < from: MR Foot No Cont, Left (04.04.18 @ 21:11) >  EXAM:  MR FOOT LT                            PROCEDURE DATE:  04/04/2018          INTERPRETATION:      MRI OF THE LEFT FOOT:    CLINICAL INDICATION: Left foot pain with clinical concern for   osteomyelitis.    TECHNIQUE: Multiplanar, multi- sequential MRI of the left left foot was   performed without contrast. Only sagittal T1 and inversion recovery   sequences as well as axial T1 sequence could be obtained. The study is   limited by significant post artifact. Comparisons with the prior left   foot radiographs dated/1/2018    IMPRESSION:    Motion degraded images demonstrate evidence of prior screw fixation of   the distal first metatarsal for bunion correction surgery. There is   severe first MTP joint arthrosis. There is severe diffuse subcutaneous   soft tissue edema dorsally consistent with cellulitis. There is no   organized fluid collection or abscess is identified.    No definite marrow signal abnormality identified to suggest osteomyelitis.    IMPRESSION:    Markedly motion degraded exam. Severe dorsal soft tissue cellulitis   without evidence of abscess or osteomyelitis. Date of Service 04-07-18     84 yo male with extensive h/o CAD s/p CABG, chronic a. fib (on coumadin), h/o Mitral valve clipping, HTN, HLD, CKD stage III, Anemia of chronic disease, h/o colonic AVM, DM2, SILVERIO, dementia, hiatal hernia, TIA is seen at bedside for followup care of left 2nd toe pain, redness and swelling. Pt reports no pain or discomfort in his lower extremity including all toes upon resting.  Pt denies N/V/F/C/SOB/CP/Abd pain/Diarrhea/Headaches. Pt states no let foot 2nd toe pain at rest.     Allergies: No Known Allergies    MEDICATIONS  (STANDING):  cefTRIAXone Injectable.      cefTRIAXone Injectable. 1000 milliGRAM(s) IV Push every 24 hours  cholecalciferol 2000 Unit(s) Oral daily  dextrose 5%. 1000 milliLiter(s) (50 mL/Hr) IV Continuous <Continuous>  dextrose 50% Injectable 12.5 Gram(s) IV Push once  dextrose 50% Injectable 25 Gram(s) IV Push once  dextrose 50% Injectable 25 Gram(s) IV Push once  escitalopram 5 milliGRAM(s) Oral daily  finasteride 5 milliGRAM(s) Oral daily  fludroCORTISONE 0.1 milliGRAM(s) Oral daily  folic acid 1 milliGRAM(s) Oral daily  insulin lispro (HumaLOG) corrective regimen sliding scale   SubCutaneous three times a day before meals  insulin lispro (HumaLOG) corrective regimen sliding scale   SubCutaneous at bedtime  lactobacillus acidophilus 1 Tablet(s) Oral three times a day with meals  pantoprazole    Tablet 40 milliGRAM(s) Oral before breakfast  polyethylene glycol 3350 17 Gram(s) Oral daily  simvastatin 40 milliGRAM(s) Oral at bedtime  sitaGLIPtin 25 milliGRAM(s) Oral daily  sodium chloride 0.9%. 1000 milliLiter(s) (80 mL/Hr) IV Continuous <Continuous>  tamsulosin 0.4 milliGRAM(s) Oral at bedtime  vancomycin  IVPB 500 milliGRAM(s) IV Intermittent every 12 hours  vancomycin  IVPB        MEDICATIONS  (PRN):  acetaminophen   Tablet. 650 milliGRAM(s) Oral every 6 hours PRN Mild Pain (1 - 3)  dextrose Gel 1 Dose(s) Oral once PRN Blood Glucose LESS THAN 70 milliGRAM(s)/deciliter  docusate sodium 100 milliGRAM(s) Oral two times a day PRN Constipation  glucagon  Injectable 1 milliGRAM(s) IntraMuscular once PRN Glucose LESS THAN 70 milligrams/deciliter  senna 2 Tablet(s) Oral at bedtime PRN Constipation      Vital Signs Last 24 Hrs  T(C): 37.3 (07 Apr 2018 12:59), Max: 37.3 (07 Apr 2018 12:59)  T(F): 99.2 (07 Apr 2018 12:59), Max: 99.2 (07 Apr 2018 12:59)  HR: 72 (07 Apr 2018 12:59) (63 - 80)  BP: 126/66 (07 Apr 2018 12:59) (101/54 - 152/70)  BP(mean): --  RR: 20 (07 Apr 2018 12:59) (18 - 20)  SpO2: 99% (07 Apr 2018 12:59) (98% - 99%)      PHYSICAL EXAM:  Constitutional: NAD, awake and alert  Lower Extremity Exam:  Left lower extremity is larger and more edematous than the right   Derm:  Left foot: erythema and edema circumscribed the 2nd toe extending around the dorsal 2nd MPJ (improving, receding to toe only with less erythema and edema). Diffused dry skin. No open lesion, no malodor, no fluctuance, no purulence, no active drainage. No interdigital maceration or fissure. Nails are discolored, dystrophic, trimmed.   Right foot: Diffused dry skin. No open lesion, no malodor, no fluctuance, no purulence, no active drainage, no erythema, no clinical signs of infection. No interdigital maceration or fissure. Nails are discolored, dystrophic, trimmed.   Vascular: Left 2nd toe is warmer compared to the rest of the toes. Pedal pulses palpable 1/4 DP and PT on the right. Non-palpable DP and PT on the L. Dorsal pitting edema (L>R, improving). Pedal hair absent b/l.   Neuro: was not able to assess  Ortho: pain upon palpation of the left 2nd toe and left 2nd MPJ ROM. No pain upon palpation and ROM of the remaining of the left foot and the right foot including toes, STJ, and ankle ROM. Not able to fully assess during this visit.     LABS:     (04-07 @ 07:49)                      8.1  7.03 )-----------( 155                 24.8    Neutrophils = -- (--%)  Lymphocytes = -- (--%)  Eosinophils = -- (--%)  Basophils = -- (--%)  Monocytes = -- (--%)  Bands = --%    04-07    139  |  107  |  56<H>  ----------------------------<  258<H>  4.6   |  26  |  1.51<H>    Ca    8.4<L>      07 Apr 2018 07:49                  .Urine None  03-28 @ 14:42   No growth  --  --    RADIOLOGY:    < from: Xray Foot AP + Lateral, Left (04.01.18 @ 15:55) >    EXAM:  XR FOOT-LEFT-2 VIEWS                            PROCEDURE DATE:  04/01/2018          INTERPRETATION:  Radiographs of the left foot         CLINICAL INFORMATION:   Foot pain.    TECHNIQUE:  Frontal, oblique and lateral views of the foot were obtained.    FINDINGS:   No prior similar studies are available for review.    There is degenerative change at the first MTP joint with a screw within   the distal first metatarsal.    The hindfoot appears intact.  No significant spur formation is   recognized.  The soft tissues appear intact.    Atherosclerotic vascular calcifications are noted.    IMPRESSION: No acute injury. Previous surgery of the first metatarsal   with placement of screw and degenerative change at the first MTP joint.    < from: MR Foot No Cont, Left (04.04.18 @ 21:11) >  EXAM:  MR FOOT LT                            PROCEDURE DATE:  04/04/2018          INTERPRETATION:      MRI OF THE LEFT FOOT:    CLINICAL INDICATION: Left foot pain with clinical concern for   osteomyelitis.    TECHNIQUE: Multiplanar, multi- sequential MRI of the left left foot was   performed without contrast. Only sagittal T1 and inversion recovery   sequences as well as axial T1 sequence could be obtained. The study is   limited by significant post artifact. Comparisons with the prior left   foot radiographs dated/1/2018    IMPRESSION:    Motion degraded images demonstrate evidence of prior screw fixation of   the distal first metatarsal for bunion correction surgery. There is   severe first MTP joint arthrosis. There is severe diffuse subcutaneous   soft tissue edema dorsally consistent with cellulitis. There is no   organized fluid collection or abscess is identified.    No definite marrow signal abnormality identified to suggest osteomyelitis.    IMPRESSION:    Markedly motion degraded exam. Severe dorsal soft tissue cellulitis   without evidence of abscess or osteomyelitis.

## 2018-04-07 NOTE — PROGRESS NOTE ADULT - PROVIDER SPECIALTY LIST ADULT
Cardiology
Hospitalist
Infectious Disease
Nephrology
Podiatry
Nephrology
Hospitalist

## 2018-04-07 NOTE — PROGRESS NOTE ADULT - SUBJECTIVE AND OBJECTIVE BOX
Patient is a 85y old  Male who presents with a chief complaint of syncope/fall (28 Mar 2018 17:53)    Date of service: 04-07-18 @ 13:57    OOB to chair  No foot pain  Has left toe tenderness with palpation     ROS: no fever or chills; denies dizziness, no HA, no SOB or cough, no abdominal pain, no diarrhea or constipation; no dysuria, no legs pain, no rashes    MEDICATIONS  (STANDING):  cefTRIAXone Injectable.      cefTRIAXone Injectable. 1000 milliGRAM(s) IV Push every 24 hours  cholecalciferol 2000 Unit(s) Oral daily  dextrose 5%. 1000 milliLiter(s) (50 mL/Hr) IV Continuous <Continuous>  dextrose 50% Injectable 12.5 Gram(s) IV Push once  dextrose 50% Injectable 25 Gram(s) IV Push once  dextrose 50% Injectable 25 Gram(s) IV Push once  escitalopram 5 milliGRAM(s) Oral daily  finasteride 5 milliGRAM(s) Oral daily  fludroCORTISONE 0.1 milliGRAM(s) Oral daily  folic acid 1 milliGRAM(s) Oral daily  insulin lispro (HumaLOG) corrective regimen sliding scale   SubCutaneous three times a day before meals  insulin lispro (HumaLOG) corrective regimen sliding scale   SubCutaneous at bedtime  lactobacillus acidophilus 1 Tablet(s) Oral three times a day with meals  pantoprazole    Tablet 40 milliGRAM(s) Oral before breakfast  polyethylene glycol 3350 17 Gram(s) Oral daily  simvastatin 40 milliGRAM(s) Oral at bedtime  sitaGLIPtin 25 milliGRAM(s) Oral daily  sodium chloride 0.9%. 1000 milliLiter(s) (80 mL/Hr) IV Continuous <Continuous>  tamsulosin 0.4 milliGRAM(s) Oral at bedtime  vancomycin  IVPB 500 milliGRAM(s) IV Intermittent every 12 hours  vancomycin  IVPB        MEDICATIONS  (PRN):  acetaminophen   Tablet. 650 milliGRAM(s) Oral every 6 hours PRN Mild Pain (1 - 3)  dextrose Gel 1 Dose(s) Oral once PRN Blood Glucose LESS THAN 70 milliGRAM(s)/deciliter  docusate sodium 100 milliGRAM(s) Oral two times a day PRN Constipation  glucagon  Injectable 1 milliGRAM(s) IntraMuscular once PRN Glucose LESS THAN 70 milligrams/deciliter  senna 2 Tablet(s) Oral at bedtime PRN Constipation      Vital Signs Last 24 Hrs  T(C): 37.3 (07 Apr 2018 12:59), Max: 37.3 (07 Apr 2018 12:59)  T(F): 99.2 (07 Apr 2018 12:59), Max: 99.2 (07 Apr 2018 12:59)  HR: 72 (07 Apr 2018 12:59) (63 - 80)  BP: 126/66 (07 Apr 2018 12:59) (101/54 - 152/70)  BP(mean): --  RR: 20 (07 Apr 2018 12:59) (18 - 20)  SpO2: 99% (07 Apr 2018 12:59) (98% - 99%)    Physical Exam:      Constitutional: frail looking  HEENT: NC/AT, EOMI, PERRLA  Neck: supple  Back: no tenderness  Respiratory: clear  Cardiovascular: S1S2 regular, no murmurs  Abdomen: soft, not tender, not distended, positive BS  Genitourinary: no LN palpable  Musculoskeletal: no muscle tenderness, no joint swelling or tenderness  Extremities: no pedal edema; left 2nd and 3rd toe erythema, edema, tenderness and warmths - much improved  Neurological: confused, moving all extremities  Skin: no rashes    Labs:                        8.5    5.15  )-----------( 157      ( 06 Apr 2018 07:13 )             25.6     04-05    141  |  108  |  44<H>  ----------------------------<  162<H>  4.2   |  27  |  1.19    Ca    8.6      05 Apr 2018 05:27    Vancomycin Level, Trough: 11.1 ug/mL (04-05 @ 05:27)                          9.6    6.63  )-----------( 156      ( 03 Apr 2018 06:24 )             27.8     04-03    140  |  107  |  49<H>  ----------------------------<  192<H>  4.6   |  28  |  1.36<H>    Ca    9.1      03 Apr 2018 06:24      Culture - Urine (collected 28 Mar 2018 14:42)  Source: .Urine None  Final Report (29 Mar 2018 23:11):    No growth        Radiology:    < from: Xray Foot AP + Lateral, Left (04.01.18 @ 15:55) >  No acute injury. Previous surgery of the first metatarsal   with placement of screw and degenerative change at the first MTP joint.    < end of copied text >    < from: CT Abdomen and Pelvis No Cont (04.01.18 @ 14:41) >  No retroperitoneal hemorrhage.  Osborne catheter inflated within the apex of the prostate gland.  Thickened urinary bladder with surrounding inflammatory changes,   correlate for cystitis, new from 3/28.  Otherwise no acute findings provided the limitation of a noncontrast exam.    < end of copied text >    < from: MR Foot No Cont, Left (04.04.18 @ 21:11) >  Markedly motion degraded exam. Severe dorsal soft tissue cellulitis   without evidence of abscess or osteomyelitis.    < end of copied text >    Advanced directives addressed: full resuscitation

## 2018-04-12 DIAGNOSIS — G47.33 OBSTRUCTIVE SLEEP APNEA (ADULT) (PEDIATRIC): ICD-10-CM

## 2018-04-12 DIAGNOSIS — Z86.73 PERSONAL HISTORY OF TRANSIENT ISCHEMIC ATTACK (TIA), AND CEREBRAL INFARCTION WITHOUT RESIDUAL DEFICITS: ICD-10-CM

## 2018-04-12 DIAGNOSIS — E11.22 TYPE 2 DIABETES MELLITUS WITH DIABETIC CHRONIC KIDNEY DISEASE: ICD-10-CM

## 2018-04-12 DIAGNOSIS — I34.0 NONRHEUMATIC MITRAL (VALVE) INSUFFICIENCY: ICD-10-CM

## 2018-04-12 DIAGNOSIS — S01.01XA LACERATION WITHOUT FOREIGN BODY OF SCALP, INITIAL ENCOUNTER: ICD-10-CM

## 2018-04-12 DIAGNOSIS — L03.116 CELLULITIS OF LEFT LOWER LIMB: ICD-10-CM

## 2018-04-12 DIAGNOSIS — R33.9 RETENTION OF URINE, UNSPECIFIED: ICD-10-CM

## 2018-04-12 DIAGNOSIS — Z95.1 PRESENCE OF AORTOCORONARY BYPASS GRAFT: ICD-10-CM

## 2018-04-12 DIAGNOSIS — M86.9 OSTEOMYELITIS, UNSPECIFIED: ICD-10-CM

## 2018-04-12 DIAGNOSIS — N30.90 CYSTITIS, UNSPECIFIED WITHOUT HEMATURIA: ICD-10-CM

## 2018-04-12 DIAGNOSIS — D62 ACUTE POSTHEMORRHAGIC ANEMIA: ICD-10-CM

## 2018-04-12 DIAGNOSIS — E86.0 DEHYDRATION: ICD-10-CM

## 2018-04-12 DIAGNOSIS — R55 SYNCOPE AND COLLAPSE: ICD-10-CM

## 2018-04-12 DIAGNOSIS — E11.69 TYPE 2 DIABETES MELLITUS WITH OTHER SPECIFIED COMPLICATION: ICD-10-CM

## 2018-04-12 DIAGNOSIS — N40.1 BENIGN PROSTATIC HYPERPLASIA WITH LOWER URINARY TRACT SYMPTOMS: ICD-10-CM

## 2018-04-12 DIAGNOSIS — I25.10 ATHEROSCLEROTIC HEART DISEASE OF NATIVE CORONARY ARTERY WITHOUT ANGINA PECTORIS: ICD-10-CM

## 2018-04-12 DIAGNOSIS — K44.9 DIAPHRAGMATIC HERNIA WITHOUT OBSTRUCTION OR GANGRENE: ICD-10-CM

## 2018-04-12 DIAGNOSIS — L03.032 CELLULITIS OF LEFT TOE: ICD-10-CM

## 2018-04-12 DIAGNOSIS — I48.2 CHRONIC ATRIAL FIBRILLATION: ICD-10-CM

## 2018-04-12 DIAGNOSIS — D63.8 ANEMIA IN OTHER CHRONIC DISEASES CLASSIFIED ELSEWHERE: ICD-10-CM

## 2018-04-12 DIAGNOSIS — F03.90 UNSPECIFIED DEMENTIA WITHOUT BEHAVIORAL DISTURBANCE: ICD-10-CM

## 2018-04-12 DIAGNOSIS — T83.83XA HEMORRHAGE DUE TO GENITOURINARY PROSTHETIC DEVICES, IMPLANTS AND GRAFTS, INITIAL ENCOUNTER: ICD-10-CM

## 2018-04-12 DIAGNOSIS — H91.90 UNSPECIFIED HEARING LOSS, UNSPECIFIED EAR: ICD-10-CM

## 2018-04-12 DIAGNOSIS — E46 UNSPECIFIED PROTEIN-CALORIE MALNUTRITION: ICD-10-CM

## 2018-04-12 DIAGNOSIS — I12.9 HYPERTENSIVE CHRONIC KIDNEY DISEASE WITH STAGE 1 THROUGH STAGE 4 CHRONIC KIDNEY DISEASE, OR UNSPECIFIED CHRONIC KIDNEY DISEASE: ICD-10-CM

## 2018-04-12 DIAGNOSIS — N17.9 ACUTE KIDNEY FAILURE, UNSPECIFIED: ICD-10-CM

## 2018-04-12 DIAGNOSIS — Z79.01 LONG TERM (CURRENT) USE OF ANTICOAGULANTS: ICD-10-CM

## 2018-04-12 DIAGNOSIS — F32.9 MAJOR DEPRESSIVE DISORDER, SINGLE EPISODE, UNSPECIFIED: ICD-10-CM

## 2018-04-12 DIAGNOSIS — N18.3 CHRONIC KIDNEY DISEASE, STAGE 3 (MODERATE): ICD-10-CM

## 2018-04-12 DIAGNOSIS — E78.5 HYPERLIPIDEMIA, UNSPECIFIED: ICD-10-CM

## 2018-04-17 ENCOUNTER — EMERGENCY (EMERGENCY)
Facility: HOSPITAL | Age: 83
LOS: 0 days | Discharge: HOME | End: 2018-04-18
Attending: EMERGENCY MEDICINE | Admitting: EMERGENCY MEDICINE
Payer: MEDICARE

## 2018-04-17 VITALS
DIASTOLIC BLOOD PRESSURE: 89 MMHG | HEART RATE: 61 BPM | SYSTOLIC BLOOD PRESSURE: 180 MMHG | HEIGHT: 67 IN | WEIGHT: 175.05 LBS | OXYGEN SATURATION: 100 % | TEMPERATURE: 98 F | RESPIRATION RATE: 18 BRPM

## 2018-04-17 DIAGNOSIS — Z98.890 OTHER SPECIFIED POSTPROCEDURAL STATES: Chronic | ICD-10-CM

## 2018-04-17 DIAGNOSIS — Z95.1 PRESENCE OF AORTOCORONARY BYPASS GRAFT: Chronic | ICD-10-CM

## 2018-04-17 DIAGNOSIS — Z98.89 OTHER SPECIFIED POSTPROCEDURAL STATES: Chronic | ICD-10-CM

## 2018-04-17 LAB
ALBUMIN SERPL ELPH-MCNC: 2.7 G/DL — LOW (ref 3.3–5)
ALP SERPL-CCNC: 111 U/L — SIGNIFICANT CHANGE UP (ref 40–120)
ALT FLD-CCNC: 37 U/L — SIGNIFICANT CHANGE UP (ref 12–78)
ANION GAP SERPL CALC-SCNC: 5 MMOL/L — SIGNIFICANT CHANGE UP (ref 5–17)
APPEARANCE UR: CLEAR — SIGNIFICANT CHANGE UP
APTT BLD: 43.8 SEC — HIGH (ref 27.5–37.4)
AST SERPL-CCNC: 25 U/L — SIGNIFICANT CHANGE UP (ref 15–37)
BASOPHILS # BLD AUTO: 0.03 K/UL — SIGNIFICANT CHANGE UP (ref 0–0.2)
BASOPHILS NFR BLD AUTO: 0.5 % — SIGNIFICANT CHANGE UP (ref 0–2)
BILIRUB SERPL-MCNC: 0.6 MG/DL — SIGNIFICANT CHANGE UP (ref 0.2–1.2)
BILIRUB UR-MCNC: NEGATIVE — SIGNIFICANT CHANGE UP
BUN SERPL-MCNC: 48 MG/DL — HIGH (ref 7–23)
CALCIUM SERPL-MCNC: 9 MG/DL — SIGNIFICANT CHANGE UP (ref 8.5–10.1)
CHLORIDE SERPL-SCNC: 108 MMOL/L — SIGNIFICANT CHANGE UP (ref 96–108)
CK SERPL-CCNC: 51 U/L — SIGNIFICANT CHANGE UP (ref 26–308)
CO2 SERPL-SCNC: 29 MMOL/L — SIGNIFICANT CHANGE UP (ref 22–31)
COLOR SPEC: (no result)
CREAT SERPL-MCNC: 1.59 MG/DL — HIGH (ref 0.5–1.3)
DIFF PNL FLD: (no result)
EOSINOPHIL # BLD AUTO: 0.08 K/UL — SIGNIFICANT CHANGE UP (ref 0–0.5)
EOSINOPHIL NFR BLD AUTO: 1.4 % — SIGNIFICANT CHANGE UP (ref 0–6)
GLUCOSE BLDC GLUCOMTR-MCNC: 206 MG/DL — HIGH (ref 70–99)
GLUCOSE SERPL-MCNC: 230 MG/DL — HIGH (ref 70–99)
GLUCOSE UR QL: 50 MG/DL
HCT VFR BLD CALC: 33 % — LOW (ref 39–50)
HGB BLD-MCNC: 10.9 G/DL — LOW (ref 13–17)
IMM GRANULOCYTES NFR BLD AUTO: 0.5 % — SIGNIFICANT CHANGE UP (ref 0–1.5)
INR BLD: 3.97 RATIO — HIGH (ref 0.88–1.16)
KETONES UR-MCNC: NEGATIVE — SIGNIFICANT CHANGE UP
LEUKOCYTE ESTERASE UR-ACNC: (no result)
LYMPHOCYTES # BLD AUTO: 0.37 K/UL — LOW (ref 1–3.3)
LYMPHOCYTES # BLD AUTO: 6.3 % — LOW (ref 13–44)
MCHC RBC-ENTMCNC: 33 GM/DL — SIGNIFICANT CHANGE UP (ref 32–36)
MCHC RBC-ENTMCNC: 33.1 PG — SIGNIFICANT CHANGE UP (ref 27–34)
MCV RBC AUTO: 100.3 FL — HIGH (ref 80–100)
MONOCYTES # BLD AUTO: 0.41 K/UL — SIGNIFICANT CHANGE UP (ref 0–0.9)
MONOCYTES NFR BLD AUTO: 7 % — SIGNIFICANT CHANGE UP (ref 2–14)
NEUTROPHILS # BLD AUTO: 4.92 K/UL — SIGNIFICANT CHANGE UP (ref 1.8–7.4)
NEUTROPHILS NFR BLD AUTO: 84.3 % — HIGH (ref 43–77)
NITRITE UR-MCNC: POSITIVE
NRBC # BLD: 0 /100 WBCS — SIGNIFICANT CHANGE UP (ref 0–0)
NT-PROBNP SERPL-SCNC: 5879 PG/ML — HIGH (ref 0–450)
PH UR: 5 — SIGNIFICANT CHANGE UP (ref 5–8)
PLATELET # BLD AUTO: 207 K/UL — SIGNIFICANT CHANGE UP (ref 150–400)
POTASSIUM SERPL-MCNC: 4.4 MMOL/L — SIGNIFICANT CHANGE UP (ref 3.5–5.3)
POTASSIUM SERPL-SCNC: 4.4 MMOL/L — SIGNIFICANT CHANGE UP (ref 3.5–5.3)
PROT SERPL-MCNC: 5.8 GM/DL — LOW (ref 6–8.3)
PROT UR-MCNC: 100 MG/DL
PROTHROM AB SERPL-ACNC: 44.1 SEC — HIGH (ref 9.8–12.7)
RBC # BLD: 3.29 M/UL — LOW (ref 4.2–5.8)
RBC # FLD: 15.3 % — HIGH (ref 10.3–14.5)
SODIUM SERPL-SCNC: 142 MMOL/L — SIGNIFICANT CHANGE UP (ref 135–145)
SP GR SPEC: 1.01 — SIGNIFICANT CHANGE UP (ref 1.01–1.02)
TROPONIN I SERPL-MCNC: <0.015 NG/ML — SIGNIFICANT CHANGE UP (ref 0.01–0.04)
UROBILINOGEN FLD QL: NEGATIVE MG/DL — SIGNIFICANT CHANGE UP
WBC # BLD: 5.84 K/UL — SIGNIFICANT CHANGE UP (ref 3.8–10.5)
WBC # FLD AUTO: 5.84 K/UL — SIGNIFICANT CHANGE UP (ref 3.8–10.5)

## 2018-04-17 PROCEDURE — 71045 X-RAY EXAM CHEST 1 VIEW: CPT | Mod: 26

## 2018-04-17 PROCEDURE — 99285 EMERGENCY DEPT VISIT HI MDM: CPT | Mod: 25

## 2018-04-17 PROCEDURE — 93010 ELECTROCARDIOGRAM REPORT: CPT

## 2018-04-17 RX ORDER — FUROSEMIDE 40 MG
40 TABLET ORAL ONCE
Qty: 0 | Refills: 0 | Status: COMPLETED | OUTPATIENT
Start: 2018-04-17 | End: 2018-04-17

## 2018-04-17 RX ORDER — SODIUM CHLORIDE 9 MG/ML
3 INJECTION INTRAMUSCULAR; INTRAVENOUS; SUBCUTANEOUS ONCE
Qty: 0 | Refills: 0 | Status: COMPLETED | OUTPATIENT
Start: 2018-04-17 | End: 2018-04-17

## 2018-04-17 RX ADMIN — Medication 40 MILLIGRAM(S): at 23:15

## 2018-04-17 RX ADMIN — SODIUM CHLORIDE 3 MILLILITER(S): 9 INJECTION INTRAMUSCULAR; INTRAVENOUS; SUBCUTANEOUS at 22:55

## 2018-04-17 NOTE — ED PROVIDER NOTE - PROGRESS NOTE DETAILS
results told to pt and family.  family states does not want pt to go back to [facility] because they do not feel he is getting the correct care there.  explained to family pt with UTI and does not warrant admission.  family agree with plan for SW eval in the AM for possible placement at another facility

## 2018-04-17 NOTE — ED ADULT NURSE NOTE - CHPI ED SYMPTOMS NEG
no tingling/no weakness/no pain/no nausea/no fever/no decreased eating/drinking/no chills/no vomiting/no numbness/no dizziness

## 2018-04-17 NOTE — ED PROVIDER NOTE - PMH
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

## 2018-04-17 NOTE — ED ADULT NURSE NOTE - OBJECTIVE STATEMENT
Brought in for hematuria, blood right eye, bilateral lower extremity edema. Daughter also concerned about diet at rehab and patient's sugar levels. Per nursing home paperwork patient had UA showing hematuria and was fecal occult positive. on doxy for left toe infection.

## 2018-04-17 NOTE — ED PROVIDER NOTE - OBJECTIVE STATEMENT
Pt is an 86 y/o M, with PMHx of BPH, CAD, Afib (on coumadin), CKD, CHF, DM, HLD, HTN, TIA, BIBEMS from [facility] for microscopic hematuria today. Hx obtained by family at bedside. Pt was in the facility s/p fall and weakness and has been there for the past 5 days. He had tests done today showing the hematuria and pt was also fecal occult +. Also reports blood in the R eye. Per family, they do not believe the pt has been properly cared for while at rehab. He has been having worsening swelling of his BLE. Currently on week 2 of 6 of doxycycline for tx of L toe infection.

## 2018-04-18 VITALS
RESPIRATION RATE: 16 BRPM | HEART RATE: 92 BPM | OXYGEN SATURATION: 99 % | TEMPERATURE: 97 F | DIASTOLIC BLOOD PRESSURE: 88 MMHG | SYSTOLIC BLOOD PRESSURE: 161 MMHG

## 2018-04-18 DIAGNOSIS — R30.0 DYSURIA: ICD-10-CM

## 2018-04-18 DIAGNOSIS — N39.0 URINARY TRACT INFECTION, SITE NOT SPECIFIED: ICD-10-CM

## 2018-04-18 DIAGNOSIS — E11.9 TYPE 2 DIABETES MELLITUS WITHOUT COMPLICATIONS: ICD-10-CM

## 2018-04-18 DIAGNOSIS — R31.9 HEMATURIA, UNSPECIFIED: ICD-10-CM

## 2018-04-18 DIAGNOSIS — Z79.899 OTHER LONG TERM (CURRENT) DRUG THERAPY: ICD-10-CM

## 2018-04-18 DIAGNOSIS — N18.9 CHRONIC KIDNEY DISEASE, UNSPECIFIED: ICD-10-CM

## 2018-04-18 DIAGNOSIS — I12.9 HYPERTENSIVE CHRONIC KIDNEY DISEASE WITH STAGE 1 THROUGH STAGE 4 CHRONIC KIDNEY DISEASE, OR UNSPECIFIED CHRONIC KIDNEY DISEASE: ICD-10-CM

## 2018-04-18 DIAGNOSIS — I25.10 ATHEROSCLEROTIC HEART DISEASE OF NATIVE CORONARY ARTERY WITHOUT ANGINA PECTORIS: ICD-10-CM

## 2018-04-18 DIAGNOSIS — E78.5 HYPERLIPIDEMIA, UNSPECIFIED: ICD-10-CM

## 2018-04-18 DIAGNOSIS — Z95.1 PRESENCE OF AORTOCORONARY BYPASS GRAFT: ICD-10-CM

## 2018-04-18 DIAGNOSIS — G45.9 TRANSIENT CEREBRAL ISCHEMIC ATTACK, UNSPECIFIED: ICD-10-CM

## 2018-04-18 LAB
BACTERIA # UR AUTO: (no result)
EPI CELLS # UR: SIGNIFICANT CHANGE UP
HCT VFR BLD CALC: 30.1 % — LOW (ref 39–50)
HGB BLD-MCNC: 10.1 G/DL — LOW (ref 13–17)
MCHC RBC-ENTMCNC: 33.3 PG — SIGNIFICANT CHANGE UP (ref 27–34)
MCHC RBC-ENTMCNC: 33.6 GM/DL — SIGNIFICANT CHANGE UP (ref 32–36)
MCV RBC AUTO: 99.3 FL — SIGNIFICANT CHANGE UP (ref 80–100)
NRBC # BLD: 0 /100 WBCS — SIGNIFICANT CHANGE UP (ref 0–0)
PLATELET # BLD AUTO: 180 K/UL — SIGNIFICANT CHANGE UP (ref 150–400)
RBC # BLD: 3.03 M/UL — LOW (ref 4.2–5.8)
RBC # FLD: 15.2 % — HIGH (ref 10.3–14.5)
RBC CASTS # UR COMP ASSIST: (no result) /HPF (ref 0–4)
WBC # BLD: 5.46 K/UL — SIGNIFICANT CHANGE UP (ref 3.8–10.5)
WBC # FLD AUTO: 5.46 K/UL — SIGNIFICANT CHANGE UP (ref 3.8–10.5)
WBC UR QL: (no result)

## 2018-04-18 RX ORDER — DIPHENHYDRAMINE HCL 50 MG
25 CAPSULE ORAL ONCE
Qty: 0 | Refills: 0 | Status: COMPLETED | OUTPATIENT
Start: 2018-04-18 | End: 2018-04-18

## 2018-04-18 RX ORDER — CEFTRIAXONE 500 MG/1
1000 INJECTION, POWDER, FOR SOLUTION INTRAMUSCULAR; INTRAVENOUS ONCE
Qty: 0 | Refills: 0 | Status: COMPLETED | OUTPATIENT
Start: 2018-04-18 | End: 2018-04-18

## 2018-04-18 RX ADMIN — CEFTRIAXONE 1000 MILLIGRAM(S): 500 INJECTION, POWDER, FOR SOLUTION INTRAMUSCULAR; INTRAVENOUS at 00:59

## 2018-04-18 RX ADMIN — Medication 25 MILLIGRAM(S): at 03:23

## 2018-04-18 NOTE — ED ADULT NURSE REASSESSMENT NOTE - NS ED NURSE REASSESS COMMENT FT1
pt confused, trying to get out of the bed. 1:1 ordered and at bedside for safety. Awaiting for  am. Will cont to monitor for safety and comfort.

## 2018-04-18 NOTE — ED ADULT NURSE REASSESSMENT NOTE - NS ED NURSE REASSESS COMMENT FT1
pt resting comfortably in bed with no acute distress noted. vss Will cont to monitor for safety and comfort.

## 2018-04-18 NOTE — ED ADULT NURSE REASSESSMENT NOTE - NS ED NURSE REASSESS COMMENT FT1
As per Lorena Chavira RN, Ass't Manger ok to d/c pt back to Nursing Home. Case Zaira Supervisor spoke with son.

## 2018-04-18 NOTE — ED ADULT NURSE REASSESSMENT NOTE - NS ED NURSE REASSESS COMMENT FT1
Mariza from  stated she spoke with son and wife and inform pt to be d/c back to [facility]. Transportation being arranged . Will continue to monitor.

## 2018-04-18 NOTE — ED ADULT NURSE REASSESSMENT NOTE - NS ED NURSE REASSESS COMMENT FT1
RN spoke with Mariza  regarding pt's wife and daughter don't want him going back to [facility]. Mariza stated she will speak with family. Pt's wife is a pt on 3rd floor and info given to Mariza DANIEL.

## 2018-04-18 NOTE — ED ADULT NURSE REASSESSMENT NOTE - NS ED NURSE REASSESS COMMENT FT1
Pt's son Tacho called and stated out right he "wants to cancel d/c". RN spoke to Shirin  who stated to tell family that he can consult out MD for opinion and we will d/c back. RN spoke with Lorena FUENTES , Ass't Manager who stated to hold transport until the Director of case MAnagement gets involved. EAS at hospital and made aware.

## 2018-04-18 NOTE — ED ADULT NURSE REASSESSMENT NOTE - NS ED NURSE REASSESS COMMENT FT1
pt confused and trying to ge out of the bed. Dr. Shay made aware. benadryl 25mg IV given as per md order. 1:1 at bedside for safety. Pt voided approx 3L. Will cont to monitor for safety and comfort.

## 2018-04-18 NOTE — ED ADULT NURSE REASSESSMENT NOTE - NS ED NURSE REASSESS COMMENT FT1
Report taken at the change of shift at bedside. Pt  resting comfortably in bed with no acute distress noted. Vitals stable. Plan of care updated to pt. Denies cp,sob,ha,dz,n/v/d/fever/chills or urinary sx. Will cont to monitor for safety and comfort.

## 2018-06-18 ENCOUNTER — APPOINTMENT (OUTPATIENT)
Dept: INTERNAL MEDICINE | Facility: CLINIC | Age: 83
End: 2018-06-18

## 2018-07-17 PROBLEM — I25.10 ATHEROSCLEROTIC HEART DISEASE OF NATIVE CORONARY ARTERY WITHOUT ANGINA PECTORIS: Chronic | Status: ACTIVE | Noted: 2018-02-27

## 2018-08-08 ENCOUNTER — FORM ENCOUNTER (OUTPATIENT)
Age: 83
End: 2018-08-08

## 2018-08-09 ENCOUNTER — OUTPATIENT (OUTPATIENT)
Dept: OUTPATIENT SERVICES | Facility: HOSPITAL | Age: 83
LOS: 1 days | End: 2018-08-09
Payer: MEDICARE

## 2018-08-09 ENCOUNTER — APPOINTMENT (OUTPATIENT)
Dept: INTERNAL MEDICINE | Facility: CLINIC | Age: 83
End: 2018-08-09
Payer: MEDICARE

## 2018-08-09 ENCOUNTER — APPOINTMENT (OUTPATIENT)
Dept: RADIOLOGY | Facility: CLINIC | Age: 83
End: 2018-08-09
Payer: MEDICARE

## 2018-08-09 VITALS
OXYGEN SATURATION: 99 % | SYSTOLIC BLOOD PRESSURE: 130 MMHG | TEMPERATURE: 97.6 F | DIASTOLIC BLOOD PRESSURE: 70 MMHG | HEIGHT: 68 IN | BODY MASS INDEX: 20.16 KG/M2 | RESPIRATION RATE: 16 BRPM | WEIGHT: 133 LBS | HEART RATE: 60 BPM

## 2018-08-09 DIAGNOSIS — Z98.890 OTHER SPECIFIED POSTPROCEDURAL STATES: Chronic | ICD-10-CM

## 2018-08-09 DIAGNOSIS — S61.401A UNSPECIFIED OPEN WOUND OF RIGHT HAND, INITIAL ENCOUNTER: ICD-10-CM

## 2018-08-09 DIAGNOSIS — M79.641 PAIN IN RIGHT HAND: ICD-10-CM

## 2018-08-09 DIAGNOSIS — Z95.1 PRESENCE OF AORTOCORONARY BYPASS GRAFT: Chronic | ICD-10-CM

## 2018-08-09 DIAGNOSIS — Z00.8 ENCOUNTER FOR OTHER GENERAL EXAMINATION: ICD-10-CM

## 2018-08-09 DIAGNOSIS — Z98.89 OTHER SPECIFIED POSTPROCEDURAL STATES: Chronic | ICD-10-CM

## 2018-08-09 DIAGNOSIS — L03.113 CELLULITIS OF RIGHT UPPER LIMB: ICD-10-CM

## 2018-08-09 PROBLEM — I50.9 HEART FAILURE, UNSPECIFIED: Chronic | Status: ACTIVE | Noted: 2018-04-17

## 2018-08-09 PROBLEM — H91.90 UNSPECIFIED HEARING LOSS, UNSPECIFIED EAR: Chronic | Status: ACTIVE | Noted: 2018-02-27

## 2018-08-09 PROBLEM — R63.4 ABNORMAL WEIGHT LOSS: Chronic | Status: ACTIVE | Noted: 2018-02-27

## 2018-08-09 PROCEDURE — 73120 X-RAY EXAM OF HAND: CPT | Mod: 26,RT

## 2018-08-09 PROCEDURE — 99214 OFFICE O/P EST MOD 30 MIN: CPT

## 2018-08-09 PROCEDURE — 73120 X-RAY EXAM OF HAND: CPT

## 2018-08-09 NOTE — HISTORY OF PRESENT ILLNESS
[FreeTextEntry8] : He has been falling more frequently over the past few months and his son questions if he should continue with coumadin.  \par He did fall and injure his hand. VNS had been doing wound care with improvement.  Few days ago He re injured his  R hand and for past 2 days increased pain and swelling.   There was concern that he may have a UTI as he is a bit more agitated.  No fevers.   He has aides and son  assist with most of  his care.  \par Home BGM .  No hypoglycemia.   [Family Member] : family member

## 2018-08-09 NOTE — REVIEW OF SYSTEMS
[Recent Weight Loss (___ Lbs)] : recent [unfilled] ~Ulb weight loss [see HPI] : see HPI [Limb Weakness] : limb weakness [Negative] : Heme/Lymph

## 2018-08-09 NOTE — ASSESSMENT
[FreeTextEntry1] : \par Will check UA/CS due to somewhat  increased cognitive changes.  \par \par Xray R hand.\par \par Start Keflex as cannot R/O cellulitis.   \par \par Wound care reviewed and Hand wound dressed with bulky DSD.  \par \par Tylenol prn.\par \par Advised son he should discuss his concerns regarding continuation of coumadin with his cardiologist .

## 2018-08-09 NOTE — PHYSICAL EXAM
[de-identified] : swelling, erythema and tenderness to dorsum R hand and fingers.  FROM. + pulse [de-identified] : 3 "Skin tear to dorsum of hand R.  No active bleeding.   [] : no respiratory distress [Respiration, Rhythm And Depth] : normal respiratory rhythm and effort [Apical Impulse] : the apical impulse was normal [Heart Rate And Rhythm] : heart rate was normal and rhythm regular [Heart Sounds Gallop] : no gallops [Murmurs] : no murmurs [FreeTextEntry1] : There is a decreased S2. There is a 2/6 systolic ejection murmur at the left sternal border

## 2018-08-15 LAB
APPEARANCE: CLEAR
BACTERIA UR CULT: NORMAL
BACTERIA: NEGATIVE
BILIRUBIN URINE: NEGATIVE
BLOOD URINE: NEGATIVE
COLOR: YELLOW
GLUCOSE QUALITATIVE U: NEGATIVE MG/DL
HYALINE CASTS: 2 /LPF
KETONES URINE: NEGATIVE
LEUKOCYTE ESTERASE URINE: NEGATIVE
MICROSCOPIC-UA: NORMAL
NITRITE URINE: NEGATIVE
PH URINE: 5
PROTEIN URINE: 30 MG/DL
RED BLOOD CELLS URINE: 2 /HPF
SPECIFIC GRAVITY URINE: 1.02
SQUAMOUS EPITHELIAL CELLS: 1 /HPF
UROBILINOGEN URINE: NEGATIVE MG/DL
WHITE BLOOD CELLS URINE: 2 /HPF

## 2018-09-18 ENCOUNTER — APPOINTMENT (OUTPATIENT)
Dept: INTERNAL MEDICINE | Facility: CLINIC | Age: 83
End: 2018-09-18

## 2018-09-25 ENCOUNTER — APPOINTMENT (OUTPATIENT)
Dept: INTERNAL MEDICINE | Facility: CLINIC | Age: 83
End: 2018-09-25
Payer: MEDICARE

## 2018-09-25 VITALS
BODY MASS INDEX: 20 KG/M2 | OXYGEN SATURATION: 97 % | TEMPERATURE: 97.6 F | DIASTOLIC BLOOD PRESSURE: 70 MMHG | RESPIRATION RATE: 16 BRPM | HEIGHT: 68 IN | HEART RATE: 50 BPM | WEIGHT: 132 LBS | SYSTOLIC BLOOD PRESSURE: 128 MMHG

## 2018-09-25 DIAGNOSIS — F32.9 MAJOR DEPRESSIVE DISORDER, SINGLE EPISODE, UNSPECIFIED: ICD-10-CM

## 2018-09-25 DIAGNOSIS — I25.10 ATHEROSCLEROTIC HEART DISEASE OF NATIVE CORONARY ARTERY W/OUT ANGINA PECTORIS: ICD-10-CM

## 2018-09-25 DIAGNOSIS — Z23 ENCOUNTER FOR IMMUNIZATION: ICD-10-CM

## 2018-09-25 DIAGNOSIS — E11.9 TYPE 2 DIABETES MELLITUS W/OUT COMPLICATIONS: ICD-10-CM

## 2018-09-25 DIAGNOSIS — F03.90 UNSPECIFIED DEMENTIA W/OUT BEHAVIORAL DISTURBANCE: ICD-10-CM

## 2018-09-25 DIAGNOSIS — J45.998 OTHER ASTHMA: ICD-10-CM

## 2018-09-25 DIAGNOSIS — J98.01 ACUTE BRONCHOSPASM: ICD-10-CM

## 2018-09-25 DIAGNOSIS — G47.30 SLEEP APNEA, UNSPECIFIED: ICD-10-CM

## 2018-09-25 DIAGNOSIS — J98.4 OTHER DISORDERS OF LUNG: ICD-10-CM

## 2018-09-25 DIAGNOSIS — R39.9 UNSPECIFIED SYMPTOMS AND SIGNS INVOLVING THE GENITOURINARY SYSTEM: ICD-10-CM

## 2018-09-25 DIAGNOSIS — G31.84 MILD COGNITIVE IMPAIRMENT, SO STATED: ICD-10-CM

## 2018-09-25 DIAGNOSIS — N18.3 CHRONIC KIDNEY DISEASE, STAGE 3 (MODERATE): ICD-10-CM

## 2018-09-25 PROCEDURE — 99213 OFFICE O/P EST LOW 20 MIN: CPT

## 2018-09-25 RX ORDER — TORSEMIDE 20 MG/1
20 TABLET ORAL
Qty: 90 | Refills: 0 | Status: ACTIVE | COMMUNITY

## 2018-09-25 RX ORDER — NITROFURANTOIN MACROCRYSTALS 100 MG/1
100 CAPSULE ORAL
Qty: 20 | Refills: 0 | Status: COMPLETED | COMMUNITY
Start: 2018-07-12 | End: 2018-09-25

## 2018-09-25 RX ORDER — ADHESIVE TAPE 3"X 2.3 YD
50 MCG TAPE, NON-MEDICATED TOPICAL
Qty: 100 | Refills: 0 | Status: ACTIVE | COMMUNITY

## 2018-09-25 RX ORDER — PRAVASTATIN SODIUM 40 MG/1
40 TABLET ORAL
Qty: 30 | Refills: 0 | Status: COMPLETED | COMMUNITY
Start: 2018-07-02

## 2018-09-25 RX ORDER — CEFDINIR 300 MG/1
300 CAPSULE ORAL
Qty: 14 | Refills: 0 | Status: COMPLETED | COMMUNITY
Start: 2018-07-16 | End: 2018-09-25

## 2018-09-25 RX ORDER — CEPHALEXIN 500 MG/1
500 CAPSULE ORAL 3 TIMES DAILY
Qty: 21 | Refills: 0 | Status: COMPLETED | COMMUNITY
Start: 2018-08-09 | End: 2018-09-25

## 2018-09-25 RX ORDER — TAMSULOSIN HYDROCHLORIDE 0.4 MG/1
0.4 CAPSULE ORAL
Qty: 90 | Refills: 2 | Status: ACTIVE | COMMUNITY
Start: 2018-07-02

## 2018-09-25 RX ORDER — SITAGLIPTIN 25 MG/1
25 TABLET, FILM COATED ORAL DAILY
Qty: 90 | Refills: 1 | Status: DISCONTINUED | COMMUNITY
Start: 2018-02-28 | End: 2018-09-25

## 2018-09-25 RX ORDER — UBIDECARENONE 200 MG
200 CAPSULE ORAL
Qty: 100 | Refills: 2 | Status: ACTIVE | COMMUNITY

## 2018-09-25 RX ORDER — FOLIC ACID 1 MG/1
1 TABLET ORAL
Refills: 1 | Status: ACTIVE | COMMUNITY
Start: 2018-07-02

## 2018-09-25 RX ORDER — BLOOD SUGAR DIAGNOSTIC
STRIP MISCELLANEOUS
Qty: 100 | Refills: 0 | Status: ACTIVE | COMMUNITY
Start: 2018-07-26

## 2018-09-25 RX ORDER — FERROUS GLUCONATE 324(38)MG
324 (38 FE) TABLET ORAL DAILY
Refills: 0 | Status: ACTIVE | COMMUNITY

## 2018-09-25 NOTE — PLAN
[FreeTextEntry1] : Mrs Gee in health care proxy for the patient.\par She and her son request conservative management of the patient's care.\par They do not want neuro of sub-specialthy consult.\par Cardiology follow up will continue but care tl be transferred to Dr Gonzalez locally.\par Amaryl will continue at 0.5mg PO QD and the son will discuss this with endocrine.\par They do not wish GI, uro, opthal or dental consult.\par Fall precautions reviewed and they will use bed alarm at . Hospital bed is refused.\par [place] to draw CBC CMP A1c at home.\par Discuss need for renal f/u if appropriate based on lab results.\par Proper hydration and adequate calorie intake reviewed.\par They refuse vaccines today.\par F/U 6 mos or sooner PRN.\par

## 2018-09-25 NOTE — HEALTH RISK ASSESSMENT
[Any fall with injury in past year] : Patient reported fall with injury in the past year [(PHQ-2) Unable to screen] : PHQ-2: unable to screen [UnableToScreenReason] : dementia

## 2018-09-25 NOTE — DATA REVIEWED
[No studies available for review at this time.] : No studies available for review at this time. [FreeTextEntry1] : Family refuses testing

## 2018-09-25 NOTE — REVIEW OF SYSTEMS
[Fever] : no fever [Chills] : no chills [Feeling Poorly] : not feeling poorly [Feeling Tired] : feeling tired [Recent Weight Gain (___ Lbs)] : no recent weight gain [Eyesight Problems] : eyesight problems [Loss Of Hearing] : hearing loss [Nosebleeds] : no nosebleeds [Nasal Discharge] : no nasal discharge [Hoarseness] : no hoarseness [Heart Rate Is Slow] : the heart rate was not slow [Heart Rate Is Fast] : the heart rate was not fast [Chest Pain] : no chest pain [Palpitations] : no palpitations [Lower Ext Edema] : no extremity edema [Cough] : cough [Orthopnea] : no orthopnea [Wheezing] : no wheezing [SOB on Exertion] : shortness of breath during exertion [PND] : no PND [Vomiting] : no vomiting [Constipation] : constipation [Diarrhea] : no diarrhea [Melena] : no melena [Dysuria] : no dysuria [Incontinence] : no incontinence [Hesitancy] : no urinary hesitancy [Nocturia] : nocturia [Arthralgias] : no arthralgias [Joint Swelling] : no joint swelling [Joint Stiffness] : joint stiffness [Skin Lesions] : no skin lesions [Confused] : confusion [Fainting] : fainting [Limb Weakness] : limb weakness [Difficulty Walking] : difficulty walking [Sleep Disturbances] : sleep disturbances [Anxiety] : no anxiety [Depression] : no depression [Muscle Weakness] : muscle weakness [Easy Bleeding] : a tendency for easy bleeding [Easy Bruising] : a tendency for easy bruising [Negative] : Heme/Lymph

## 2018-09-25 NOTE — COUNSELING
[Weight management counseling provided] : Weight management [Healthy eating counseling provided] : healthy eating [Activity counseling provided] : activity [Patient Non-adherent to care plan] : Patient non-adherent to care plan [Transportation Issues] : Transportation issues [Patient motivation] : Patient motivation [Needs reinforcement, referred] : Patient needs reinforcement on understanding lifestyle changes and  the steps needed to achieve self management and patient was referred

## 2018-09-25 NOTE — HISTORY OF PRESENT ILLNESS
[Spouse] : spouse [Family Member] : family member [FreeTextEntry1] : COPD, dementia, DM and SILVERIO. [de-identified] : The patient's neurologic status "is much worse now" based on reports from wife and son. He has short attention span and sleeps most of the day. He does not communicate for more than a few sentences. He eats well and is continent of bladder/bowel. He has fallen at home and continues to attempt to get out of bed on his own through the night. He manages to get around or over any barricades or gaits in the bedroom. \par \par His son has been in conversation with endocrine regarding DM management. Amaryl dose has been reduced to 0.5mg QD and Januvia stopped. Blood sugar ranges from  during the day. The patient shows no signs or sxs of chest pain, chest congestion, sputum production, hemoptysis, wheeze or LE edema. He has been compliant with medications but will not wear CPAP at HS. They have not sought consult with movement disorder specialist, cardiology or GI. Exertional dyspnea is stable with ADLs.

## 2018-09-25 NOTE — PHYSICAL EXAM
[Comprehensive Foot Exam Normal] : Right and left foot were examined and both feet are normal. No ulcers in either foot. Toes are normal and with full ROM.  Normal tactile sensation with monofilament testing throughout both feet [General Appearance - Alert] : alert [General Appearance - In No Acute Distress] : in no acute distress [General Appearance - Well Developed] : well developed [Sclera] : the sclera and conjunctiva were normal [PERRL With Normal Accommodation] : pupils were equal in size, round, and reactive to light [Strabismus] : no strabismus was seen [Outer Ear] : the ears and nose were normal in appearance [Hearing Threshold Finger Rub Not [place]] : hearing was normal [Examination Of The Oral Cavity] : the lips and gums were normal [Nasal Cavity] : the nasal mucosa and septum were normal [Neck Appearance] : the appearance of the neck was normal [Neck Cervical Mass (___cm)] : no neck mass was observed [Jugular Venous Distention Increased] : there was no jugular-venous distention [Respiration, Rhythm And Depth] : normal respiratory rhythm and effort [Exaggerated Use Of Accessory Muscles For Inspiration] : no accessory muscle use [Auscultation Breath Sounds / Voice Sounds] : lungs were clear to auscultation bilaterally [Heart Sounds] : normal S1 and S2 [Heart Sounds Gallop] : no gallops [Regular-Premature Beats] : the rhythm was regular with premature beats [Systolic grade ___/6] : A grade [unfilled]/6 systolic murmur was heard. [Full Pulse] : the pedal pulses are present [Edema] : there was no peripheral edema [Veins - Varicosity Changes] : there were no varicosital changes [Abdomen Soft] : soft [Abdomen Tenderness] : non-tender [Cervical Lymph Nodes Enlarged Anterior Bilaterally] : anterior cervical [Supraclavicular Lymph Nodes Enlarged Bilaterally] : supraclavicular [Abnormal Walk] : normal gait [Nail Clubbing] : no clubbing  or cyanosis of the fingernails [Musculoskeletal - Swelling] : no joint swelling seen [Motor Tone] : muscle strength and tone were normal [Skin Color & Pigmentation] : normal skin color and pigmentation [] : no rash [Motor Exam] : the motor exam was normal [No Focal Deficits] : no focal deficits [FreeTextEntry1] : confused to place and time [Mood] : the mood was normal [Memory Remote] : remote memory was not impaired

## 2018-10-03 DIAGNOSIS — N28.9 DISORDER OF KIDNEY AND URETER, UNSPECIFIED: ICD-10-CM

## 2018-10-25 ENCOUNTER — APPOINTMENT (OUTPATIENT)
Dept: INTERNAL MEDICINE | Facility: CLINIC | Age: 83
End: 2018-10-25
Payer: MEDICARE

## 2018-10-25 VITALS
WEIGHT: 125 LBS | HEIGHT: 68 IN | RESPIRATION RATE: 16 BRPM | OXYGEN SATURATION: 99 % | SYSTOLIC BLOOD PRESSURE: 110 MMHG | BODY MASS INDEX: 18.94 KG/M2 | HEART RATE: 68 BPM | DIASTOLIC BLOOD PRESSURE: 70 MMHG

## 2018-10-25 DIAGNOSIS — D69.2 OTHER NONTHROMBOCYTOPENIC PURPURA: ICD-10-CM

## 2018-10-25 PROCEDURE — 99213 OFFICE O/P EST LOW 20 MIN: CPT

## 2018-10-25 RX ORDER — WARFARIN 2.5 MG/1
2.5 TABLET ORAL
Qty: 15 | Refills: 0 | Status: DISCONTINUED | COMMUNITY
Start: 2018-07-02 | End: 2018-10-25

## 2018-10-25 RX ORDER — WARFARIN 4 MG/1
4 TABLET ORAL
Qty: 90 | Refills: 0 | Status: DISCONTINUED | COMMUNITY
Start: 2018-03-25 | End: 2018-10-25

## 2018-10-25 NOTE — HISTORY OF PRESENT ILLNESS
[FreeTextEntry8] : Patient arrives today accompanied by his wife and 24 hour caregiver, as he has profound dementia.  \par Wife is concerned about "black spots" on his forearms bilaterally.\par Has noticed these "spots" for many months, but son visited from [place] and was concerned.\par Denies bleeding from these sites.\par Of note, patient has been on Coumadin for many years and this anticoagulant was discontinued by Dr. Porter (cardiology) 1 week ago.\par Patient is noncommunicative although he does respond to simple commands.

## 2018-10-25 NOTE — REVIEW OF SYSTEMS
[Confusion] : confusion [Unsteady Walk] : ataxia [Memory Loss] : memory loss [Easy Bruising] : easy bruising [Negative] : Musculoskeletal [Easy Bleeding] : no easy bleeding [de-identified] : purpura [de-identified] : patient nonverbal

## 2018-10-25 NOTE — PHYSICAL EXAM
[No Acute Distress] : no acute distress [Cachectic] : cachexia was observed [Normal Sclera/Conjunctiva] : normal sclera/conjunctiva [PERRL] : pupils equal round and reactive to light [Normal Oropharynx] : the oropharynx was normal [Normal TMs] : both tympanic membranes were normal [Supple] : supple [No Respiratory Distress] : no respiratory distress  [Clear to Auscultation] : lungs were clear to auscultation bilaterally [Normal Rate] : normal rate  [Regular Rhythm] : with a regular rhythm [No Edema] : there was no peripheral edema [Soft] : abdomen soft [Non Tender] : non-tender [de-identified] : Muscle wasting. Decreased strength and tone. [de-identified] : Multiple purpura, skin is dry, poor turgor, intact, no tears. [de-identified] : Unsteady gait [de-identified] : noncommunicative but responds to simple commands.

## 2018-10-25 NOTE — PLAN
[FreeTextEntry1] : Patient's wife was assured that the spots that she is seeing is aresult of bleeding under skin from blood thinning agent and will eventually reabsorb.\par Refuses Influenza vaccine.  Wife will check with Dr. Porter tomorrow and consider.\par Call office if no resolve of purpura, or changes in the areas in question.

## 2018-10-25 NOTE — ASSESSMENT
[FreeTextEntry1] : Patient has multiple purpura on forearms.\par Dr. Porter recently discontinued warfarin at the request of the patient's son.

## 2018-11-07 ENCOUNTER — MEDICATION RENEWAL (OUTPATIENT)
Age: 83
End: 2018-11-07

## 2018-11-28 ENCOUNTER — LABORATORY RESULT (OUTPATIENT)
Age: 83
End: 2018-11-28

## 2019-01-10 DIAGNOSIS — D64.9 ANEMIA, UNSPECIFIED: ICD-10-CM

## 2019-03-25 ENCOUNTER — APPOINTMENT (OUTPATIENT)
Dept: INTERNAL MEDICINE | Facility: CLINIC | Age: 84
End: 2019-03-25

## 2019-03-26 DIAGNOSIS — L03.019 CELLULITIS OF UNSPECIFIED FINGER: ICD-10-CM

## 2019-03-26 RX ORDER — CEPHALEXIN 500 MG/1
500 TABLET ORAL EVERY 8 HOURS
Qty: 21 | Refills: 0 | Status: ACTIVE | COMMUNITY
Start: 2019-03-26 | End: 1900-01-01

## 2019-04-23 ENCOUNTER — MEDICATION RENEWAL (OUTPATIENT)
Age: 84
End: 2019-04-23

## 2019-06-20 ENCOUNTER — INPATIENT (INPATIENT)
Facility: HOSPITAL | Age: 84
LOS: 11 days | Discharge: HOSPICE | End: 2019-07-02
Attending: INTERNAL MEDICINE | Admitting: INTERNAL MEDICINE
Payer: MEDICARE

## 2019-06-20 VITALS
RESPIRATION RATE: 17 BRPM | TEMPERATURE: 100 F | HEIGHT: 66 IN | DIASTOLIC BLOOD PRESSURE: 70 MMHG | OXYGEN SATURATION: 96 % | SYSTOLIC BLOOD PRESSURE: 107 MMHG | WEIGHT: 130.07 LBS | HEART RATE: 70 BPM

## 2019-06-20 DIAGNOSIS — Z98.890 OTHER SPECIFIED POSTPROCEDURAL STATES: Chronic | ICD-10-CM

## 2019-06-20 DIAGNOSIS — Z95.1 PRESENCE OF AORTOCORONARY BYPASS GRAFT: Chronic | ICD-10-CM

## 2019-06-20 DIAGNOSIS — Z98.89 OTHER SPECIFIED POSTPROCEDURAL STATES: Chronic | ICD-10-CM

## 2019-06-20 LAB
ALBUMIN SERPL ELPH-MCNC: 3 G/DL — LOW (ref 3.3–5)
ALLERGY+IMMUNOLOGY DIAG STUDY NOTE: SIGNIFICANT CHANGE UP
ALP SERPL-CCNC: 77 U/L — SIGNIFICANT CHANGE UP (ref 40–120)
ALT FLD-CCNC: 33 U/L — SIGNIFICANT CHANGE UP (ref 12–78)
ANION GAP SERPL CALC-SCNC: 7 MMOL/L — SIGNIFICANT CHANGE UP (ref 5–17)
APTT BLD: 29.9 SEC — SIGNIFICANT CHANGE UP (ref 27.5–36.3)
AST SERPL-CCNC: 30 U/L — SIGNIFICANT CHANGE UP (ref 15–37)
BASOPHILS # BLD AUTO: 0.03 K/UL — SIGNIFICANT CHANGE UP (ref 0–0.2)
BASOPHILS NFR BLD AUTO: 0.4 % — SIGNIFICANT CHANGE UP (ref 0–2)
BILIRUB SERPL-MCNC: 0.6 MG/DL — SIGNIFICANT CHANGE UP (ref 0.2–1.2)
BUN SERPL-MCNC: 57 MG/DL — HIGH (ref 7–23)
CALCIUM SERPL-MCNC: 9 MG/DL — SIGNIFICANT CHANGE UP (ref 8.5–10.1)
CHLORIDE SERPL-SCNC: 108 MMOL/L — SIGNIFICANT CHANGE UP (ref 96–108)
CO2 SERPL-SCNC: 26 MMOL/L — SIGNIFICANT CHANGE UP (ref 22–31)
CREAT SERPL-MCNC: 1.47 MG/DL — HIGH (ref 0.5–1.3)
EOSINOPHIL # BLD AUTO: 0.15 K/UL — SIGNIFICANT CHANGE UP (ref 0–0.5)
EOSINOPHIL NFR BLD AUTO: 1.8 % — SIGNIFICANT CHANGE UP (ref 0–6)
GLUCOSE SERPL-MCNC: 169 MG/DL — HIGH (ref 70–99)
HCT VFR BLD CALC: 31.8 % — LOW (ref 39–50)
HGB BLD-MCNC: 10.4 G/DL — LOW (ref 13–17)
IMM GRANULOCYTES NFR BLD AUTO: 0.5 % — SIGNIFICANT CHANGE UP (ref 0–1.5)
INR BLD: 1.09 RATIO — SIGNIFICANT CHANGE UP (ref 0.88–1.16)
LYMPHOCYTES # BLD AUTO: 0.49 K/UL — LOW (ref 1–3.3)
LYMPHOCYTES # BLD AUTO: 6 % — LOW (ref 13–44)
MCHC RBC-ENTMCNC: 31.2 PG — SIGNIFICANT CHANGE UP (ref 27–34)
MCHC RBC-ENTMCNC: 32.7 GM/DL — SIGNIFICANT CHANGE UP (ref 32–36)
MCV RBC AUTO: 95.5 FL — SIGNIFICANT CHANGE UP (ref 80–100)
MONOCYTES # BLD AUTO: 0.42 K/UL — SIGNIFICANT CHANGE UP (ref 0–0.9)
MONOCYTES NFR BLD AUTO: 5.1 % — SIGNIFICANT CHANGE UP (ref 2–14)
NEUTROPHILS # BLD AUTO: 7.03 K/UL — SIGNIFICANT CHANGE UP (ref 1.8–7.4)
NEUTROPHILS NFR BLD AUTO: 86.2 % — HIGH (ref 43–77)
PLATELET # BLD AUTO: 184 K/UL — SIGNIFICANT CHANGE UP (ref 150–400)
POTASSIUM SERPL-MCNC: 5 MMOL/L — SIGNIFICANT CHANGE UP (ref 3.5–5.3)
POTASSIUM SERPL-SCNC: 5 MMOL/L — SIGNIFICANT CHANGE UP (ref 3.5–5.3)
PROT SERPL-MCNC: 6.3 GM/DL — SIGNIFICANT CHANGE UP (ref 6–8.3)
PROTHROM AB SERPL-ACNC: 12.1 SEC — SIGNIFICANT CHANGE UP (ref 10–12.9)
RBC # BLD: 3.33 M/UL — LOW (ref 4.2–5.8)
RBC # FLD: 15.6 % — HIGH (ref 10.3–14.5)
SODIUM SERPL-SCNC: 141 MMOL/L — SIGNIFICANT CHANGE UP (ref 135–145)
WBC # BLD: 8.16 K/UL — SIGNIFICANT CHANGE UP (ref 3.8–10.5)
WBC # FLD AUTO: 8.16 K/UL — SIGNIFICANT CHANGE UP (ref 3.8–10.5)

## 2019-06-20 PROCEDURE — 99285 EMERGENCY DEPT VISIT HI MDM: CPT

## 2019-06-20 PROCEDURE — 71045 X-RAY EXAM CHEST 1 VIEW: CPT | Mod: 26

## 2019-06-20 PROCEDURE — 93010 ELECTROCARDIOGRAM REPORT: CPT | Mod: GV

## 2019-06-20 RX ORDER — SODIUM CHLORIDE 9 MG/ML
3 INJECTION INTRAMUSCULAR; INTRAVENOUS; SUBCUTANEOUS ONCE
Refills: 0 | Status: COMPLETED | OUTPATIENT
Start: 2019-06-20 | End: 2019-06-20

## 2019-06-20 RX ADMIN — SODIUM CHLORIDE 3 MILLILITER(S): 9 INJECTION INTRAMUSCULAR; INTRAVENOUS; SUBCUTANEOUS at 22:14

## 2019-06-20 NOTE — ED ADULT NURSE REASSESSMENT NOTE - NS ED NURSE REASSESS COMMENT FT1
unable to obtain orthostatic BPs due to pts inability to stand or follow commands. pt cleaned, changed, repositioned at this time. family at bedside.

## 2019-06-20 NOTE — ED ADULT TRIAGE NOTE - CHIEF COMPLAINT QUOTE
Pt presents to ED from home complaining of rectal bleeding starting approx. 45 minutes PTA of EMS. Pt has hx of dementia.  Unable to obtain much information from patient, as per EMS family is following.

## 2019-06-20 NOTE — ED ADULT NURSE NOTE - NSIMPLEMENTINTERV_GEN_ALL_ED
Implemented All Fall with Harm Risk Interventions:  [place] to call system. Call bell, personal items and telephone within reach. Instruct patient to call for assistance. Room bathroom lighting operational. Non-slip footwear when patient is off stretcher. Physically safe environment: no spills, clutter or unnecessary equipment. Stretcher in lowest position, wheels locked, appropriate side rails in place. Provide visual cue, wrist band, yellow gown, etc. Monitor gait and stability. Monitor for mental status changes and reorient to person, place, and time. Review medications for side effects contributing to fall risk. Reinforce activity limits and safety measures with patient and family. Provide visual clues: red socks.

## 2019-06-20 NOTE — ED ADULT NURSE NOTE - OBJECTIVE STATEMENT
pt presents to ED with complaints of rectal bleeding. pt arrived to ED covered in blood and feces from home. per EMS, family found pt laying in stool and pool of blood and called for ambulance. pt cleaned and blood removed upon arrival to ED to allow for assessment. no active rectal bleeding noted upon exam. pt changed, repositioned. 18 g placed to left AC. 20 g placed to right FA. labs sent. EKG performed. Tele monitoring initiated. family arrived at bedside during assessment and updated on plan of care.

## 2019-06-20 NOTE — ED PROVIDER NOTE - OBJECTIVE STATEMENT
85 yo CAD, afib (on coumadin), CKD, CHF, DM, HLD, HTN, MR, TIA, Anemia w c/o rectal bleeding.  Patient had a BM at home, with bright red blood that tracked up his back.  Had another bloody movement here in ED.  Has h/o AVM with rectal bleeding in the past, but as per family has been many years.  Last endoscopy/colonoscopy a few years ago.  Patient offers no hx.

## 2019-06-21 LAB
ANION GAP SERPL CALC-SCNC: 7 MMOL/L — SIGNIFICANT CHANGE UP (ref 5–17)
APPEARANCE UR: CLEAR — SIGNIFICANT CHANGE UP
BACTERIA # UR AUTO: ABNORMAL
BASOPHILS # BLD AUTO: 0.03 K/UL — SIGNIFICANT CHANGE UP (ref 0–0.2)
BASOPHILS NFR BLD AUTO: 0.4 % — SIGNIFICANT CHANGE UP (ref 0–2)
BILIRUB UR-MCNC: NEGATIVE — SIGNIFICANT CHANGE UP
BUN SERPL-MCNC: 66 MG/DL — HIGH (ref 7–23)
CALCIUM SERPL-MCNC: 8.8 MG/DL — SIGNIFICANT CHANGE UP (ref 8.5–10.1)
CHLORIDE SERPL-SCNC: 112 MMOL/L — HIGH (ref 96–108)
CO2 SERPL-SCNC: 24 MMOL/L — SIGNIFICANT CHANGE UP (ref 22–31)
COLOR SPEC: YELLOW — SIGNIFICANT CHANGE UP
CREAT SERPL-MCNC: 1.46 MG/DL — HIGH (ref 0.5–1.3)
DIFF PNL FLD: ABNORMAL
EOSINOPHIL # BLD AUTO: 0.1 K/UL — SIGNIFICANT CHANGE UP (ref 0–0.5)
EOSINOPHIL NFR BLD AUTO: 1.2 % — SIGNIFICANT CHANGE UP (ref 0–6)
EPI CELLS # UR: NEGATIVE — SIGNIFICANT CHANGE UP
GLUCOSE BLDC GLUCOMTR-MCNC: 170 MG/DL — HIGH (ref 70–99)
GLUCOSE BLDC GLUCOMTR-MCNC: 181 MG/DL — HIGH (ref 70–99)
GLUCOSE BLDC GLUCOMTR-MCNC: 183 MG/DL — HIGH (ref 70–99)
GLUCOSE BLDC GLUCOMTR-MCNC: 192 MG/DL — HIGH (ref 70–99)
GLUCOSE SERPL-MCNC: 200 MG/DL — HIGH (ref 70–99)
GLUCOSE UR QL: NEGATIVE MG/DL — SIGNIFICANT CHANGE UP
HCT VFR BLD CALC: 27.7 % — LOW (ref 39–50)
HCT VFR BLD CALC: 30.5 % — LOW (ref 39–50)
HGB BLD-MCNC: 10.1 G/DL — LOW (ref 13–17)
HGB BLD-MCNC: 8.8 G/DL — LOW (ref 13–17)
IMM GRANULOCYTES NFR BLD AUTO: 0.6 % — SIGNIFICANT CHANGE UP (ref 0–1.5)
KETONES UR-MCNC: NEGATIVE — SIGNIFICANT CHANGE UP
LEUKOCYTE ESTERASE UR-ACNC: NEGATIVE — SIGNIFICANT CHANGE UP
LYMPHOCYTES # BLD AUTO: 0.53 K/UL — LOW (ref 1–3.3)
LYMPHOCYTES # BLD AUTO: 6.4 % — LOW (ref 13–44)
MCHC RBC-ENTMCNC: 30.3 PG — SIGNIFICANT CHANGE UP (ref 27–34)
MCHC RBC-ENTMCNC: 30.6 PG — SIGNIFICANT CHANGE UP (ref 27–34)
MCHC RBC-ENTMCNC: 31.8 GM/DL — LOW (ref 32–36)
MCHC RBC-ENTMCNC: 33.1 GM/DL — SIGNIFICANT CHANGE UP (ref 32–36)
MCV RBC AUTO: 91.6 FL — SIGNIFICANT CHANGE UP (ref 80–100)
MCV RBC AUTO: 96.2 FL — SIGNIFICANT CHANGE UP (ref 80–100)
MONOCYTES # BLD AUTO: 0.52 K/UL — SIGNIFICANT CHANGE UP (ref 0–0.9)
MONOCYTES NFR BLD AUTO: 6.2 % — SIGNIFICANT CHANGE UP (ref 2–14)
NEUTROPHILS # BLD AUTO: 7.11 K/UL — SIGNIFICANT CHANGE UP (ref 1.8–7.4)
NEUTROPHILS NFR BLD AUTO: 85.2 % — HIGH (ref 43–77)
NITRITE UR-MCNC: NEGATIVE — SIGNIFICANT CHANGE UP
PH UR: 5 — SIGNIFICANT CHANGE UP (ref 5–8)
PLATELET # BLD AUTO: 152 K/UL — SIGNIFICANT CHANGE UP (ref 150–400)
PLATELET # BLD AUTO: 165 K/UL — SIGNIFICANT CHANGE UP (ref 150–400)
POTASSIUM SERPL-MCNC: 5.1 MMOL/L — SIGNIFICANT CHANGE UP (ref 3.5–5.3)
POTASSIUM SERPL-SCNC: 5.1 MMOL/L — SIGNIFICANT CHANGE UP (ref 3.5–5.3)
PROT UR-MCNC: 100 MG/DL
RBC # BLD: 2.88 M/UL — LOW (ref 4.2–5.8)
RBC # BLD: 3.33 M/UL — LOW (ref 4.2–5.8)
RBC # FLD: 15.2 % — HIGH (ref 10.3–14.5)
RBC # FLD: 15.7 % — HIGH (ref 10.3–14.5)
RBC CASTS # UR COMP ASSIST: SIGNIFICANT CHANGE UP /HPF (ref 0–4)
SODIUM SERPL-SCNC: 143 MMOL/L — SIGNIFICANT CHANGE UP (ref 135–145)
SP GR SPEC: 1.02 — SIGNIFICANT CHANGE UP (ref 1.01–1.02)
UROBILINOGEN FLD QL: NEGATIVE MG/DL — SIGNIFICANT CHANGE UP
WBC # BLD: 7.18 K/UL — SIGNIFICANT CHANGE UP (ref 3.8–10.5)
WBC # BLD: 8.34 K/UL — SIGNIFICANT CHANGE UP (ref 3.8–10.5)
WBC # FLD AUTO: 7.18 K/UL — SIGNIFICANT CHANGE UP (ref 3.8–10.5)
WBC # FLD AUTO: 8.34 K/UL — SIGNIFICANT CHANGE UP (ref 3.8–10.5)
WBC UR QL: SIGNIFICANT CHANGE UP

## 2019-06-21 PROCEDURE — 74174 CTA ABD&PLVS W/CONTRAST: CPT | Mod: 26

## 2019-06-21 RX ORDER — WARFARIN SODIUM 2.5 MG/1
1 TABLET ORAL
Qty: 0 | Refills: 0 | DISCHARGE

## 2019-06-21 RX ORDER — SITAGLIPTIN 50 MG/1
1 TABLET, FILM COATED ORAL
Qty: 0 | Refills: 0 | DISCHARGE

## 2019-06-21 RX ORDER — ONDANSETRON 8 MG/1
4 TABLET, FILM COATED ORAL EVERY 6 HOURS
Refills: 0 | Status: DISCONTINUED | OUTPATIENT
Start: 2019-06-21 | End: 2019-07-02

## 2019-06-21 RX ORDER — CHOLECALCIFEROL (VITAMIN D3) 125 MCG
1 CAPSULE ORAL
Qty: 0 | Refills: 0 | DISCHARGE

## 2019-06-21 RX ORDER — DEXTROSE 50 % IN WATER 50 %
25 SYRINGE (ML) INTRAVENOUS ONCE
Refills: 0 | Status: DISCONTINUED | OUTPATIENT
Start: 2019-06-21 | End: 2019-07-02

## 2019-06-21 RX ORDER — SODIUM CHLORIDE 9 MG/ML
1500 INJECTION INTRAMUSCULAR; INTRAVENOUS; SUBCUTANEOUS ONCE
Refills: 0 | Status: COMPLETED | OUTPATIENT
Start: 2019-06-21 | End: 2019-06-21

## 2019-06-21 RX ORDER — SIMVASTATIN 20 MG/1
1 TABLET, FILM COATED ORAL
Qty: 0 | Refills: 0 | DISCHARGE

## 2019-06-21 RX ORDER — PANTOPRAZOLE SODIUM 20 MG/1
40 TABLET, DELAYED RELEASE ORAL EVERY 12 HOURS
Refills: 0 | Status: DISCONTINUED | OUTPATIENT
Start: 2019-06-21 | End: 2019-06-26

## 2019-06-21 RX ORDER — GLIMEPIRIDE 1 MG
1 TABLET ORAL
Qty: 0 | Refills: 0 | DISCHARGE

## 2019-06-21 RX ORDER — DEXTROSE 50 % IN WATER 50 %
12.5 SYRINGE (ML) INTRAVENOUS ONCE
Refills: 0 | Status: DISCONTINUED | OUTPATIENT
Start: 2019-06-21 | End: 2019-07-02

## 2019-06-21 RX ORDER — DEXTROSE 50 % IN WATER 50 %
15 SYRINGE (ML) INTRAVENOUS ONCE
Refills: 0 | Status: DISCONTINUED | OUTPATIENT
Start: 2019-06-21 | End: 2019-07-02

## 2019-06-21 RX ORDER — SODIUM CHLORIDE 9 MG/ML
1000 INJECTION, SOLUTION INTRAVENOUS
Refills: 0 | Status: DISCONTINUED | OUTPATIENT
Start: 2019-06-21 | End: 2019-07-02

## 2019-06-21 RX ORDER — HYDROXYZINE HCL 10 MG
25 TABLET ORAL ONCE
Refills: 0 | Status: COMPLETED | OUTPATIENT
Start: 2019-06-21 | End: 2019-06-21

## 2019-06-21 RX ORDER — INSULIN LISPRO 100/ML
VIAL (ML) SUBCUTANEOUS
Refills: 0 | Status: DISCONTINUED | OUTPATIENT
Start: 2019-06-21 | End: 2019-07-02

## 2019-06-21 RX ORDER — GLUCAGON INJECTION, SOLUTION 0.5 MG/.1ML
1 INJECTION, SOLUTION SUBCUTANEOUS ONCE
Refills: 0 | Status: DISCONTINUED | OUTPATIENT
Start: 2019-06-21 | End: 2019-07-02

## 2019-06-21 RX ORDER — FINASTERIDE 5 MG/1
5 TABLET, FILM COATED ORAL DAILY
Refills: 0 | Status: DISCONTINUED | OUTPATIENT
Start: 2019-06-21 | End: 2019-07-02

## 2019-06-21 RX ORDER — TAMSULOSIN HYDROCHLORIDE 0.4 MG/1
0.4 CAPSULE ORAL AT BEDTIME
Refills: 0 | Status: DISCONTINUED | OUTPATIENT
Start: 2019-06-21 | End: 2019-07-02

## 2019-06-21 RX ORDER — SODIUM CHLORIDE 9 MG/ML
1000 INJECTION, SOLUTION INTRAVENOUS
Refills: 0 | Status: DISCONTINUED | OUTPATIENT
Start: 2019-06-21 | End: 2019-06-26

## 2019-06-21 RX ORDER — SIMVASTATIN 20 MG/1
40 TABLET, FILM COATED ORAL AT BEDTIME
Refills: 0 | Status: DISCONTINUED | OUTPATIENT
Start: 2019-06-21 | End: 2019-07-02

## 2019-06-21 RX ORDER — INSULIN LISPRO 100/ML
VIAL (ML) SUBCUTANEOUS AT BEDTIME
Refills: 0 | Status: DISCONTINUED | OUTPATIENT
Start: 2019-06-21 | End: 2019-07-02

## 2019-06-21 RX ORDER — UBIDECARENONE 100 MG
1 CAPSULE ORAL
Qty: 0 | Refills: 0 | DISCHARGE

## 2019-06-21 RX ADMIN — SODIUM CHLORIDE 50 MILLILITER(S): 9 INJECTION, SOLUTION INTRAVENOUS at 09:26

## 2019-06-21 RX ADMIN — PANTOPRAZOLE SODIUM 40 MILLIGRAM(S): 20 TABLET, DELAYED RELEASE ORAL at 18:13

## 2019-06-21 RX ADMIN — SIMVASTATIN 40 MILLIGRAM(S): 20 TABLET, FILM COATED ORAL at 21:58

## 2019-06-21 RX ADMIN — SODIUM CHLORIDE 1500 MILLILITER(S): 9 INJECTION INTRAMUSCULAR; INTRAVENOUS; SUBCUTANEOUS at 03:24

## 2019-06-21 RX ADMIN — TAMSULOSIN HYDROCHLORIDE 0.4 MILLIGRAM(S): 0.4 CAPSULE ORAL at 21:58

## 2019-06-21 RX ADMIN — Medication 25 MILLIGRAM(S): at 21:58

## 2019-06-21 RX ADMIN — SODIUM CHLORIDE 1500 MILLILITER(S): 9 INJECTION INTRAMUSCULAR; INTRAVENOUS; SUBCUTANEOUS at 03:30

## 2019-06-21 RX ADMIN — PANTOPRAZOLE SODIUM 40 MILLIGRAM(S): 20 TABLET, DELAYED RELEASE ORAL at 09:30

## 2019-06-21 NOTE — ED ADULT NURSE REASSESSMENT NOTE - NS ED NURSE REASSESS COMMENT FT1
MOLST Form signed and placed in chart. Wife phone# Amanda GeeJrofrox-[phone], Mejia Gee phone#[phone]-son,Christine Joseph phone daughter -[phone]

## 2019-06-21 NOTE — H&P ADULT - ASSESSMENT
86/M with PMHx of CAD, AFib no on coumadin, CKD3, CHF, DM2, HLD, HTN, MR, TIA, Anemia, admitted with     1) Rectal Bleeding, painless: likely AVMs vs hemorrhoids vs colon CA  admit to med floor  had a detailed discussion with wife and son, they want conservative Mx at this time; no colonoscopy at this time  GI consult  hold asa  CTA abdo noted; r/o rectal malignancy    2) Anemia, acute on chronic d/t anemia of GI bleed: Hb better after PRBC transfusions in ER.  serial f/u    3) Hx of A fib: monitor    4) DM 2: hold Glimepiride  ISS    5) CKD 3: monitor    6) DVT PPX: venodynes    IMPROVE VTE Individual Risk Assessment    RISK                                                                Points    [  ] Previous VTE                                                  3    [  ] Thrombophilia                                               2    [  ] Lower limb paralysis                                      2        (unable to hold up >15 seconds)      [  ] Current Cancer                                              2         (within 6 months)    [  ] Immobilization > 24 hrs                                1    [  ] ICU/CCU stay > 24 hours                              1    [ x ] Age > 60                                                      1    IMPROVE VTE Score ___1______    IMPROVE Score 0-1: Low Risk, No VTE prophylaxis required for most patients, encourage ambulation.   IMPROVE Score 2-3: At risk, pharmacologic VTE prophylaxis is indicated for most patients (in the absence of a contraindication)  IMPROVE Score > or = 4: High Risk, pharmacologic VTE prophylaxis is indicated for most patients (in the absence of a contraindication)    poc discussed at length with pt's wife , Amanda; son, Mejia, team 86/M with PMHx of CAD, AFib no on coumadin, CKD3, CHF, DM2, HLD, HTN, MR, TIA, Anemia, admitted with     1) Rectal Bleeding, painless: likely AVMs vs hemorrhoids vs colon CA  admit to med floor  had a detailed discussion with wife and son, they want conservative Mx at this time; no colonoscopy at this time  GI consult  hold asa  CTA abdo noted; r/o rectal malignancy  npo except meds  iv PPI    2) Anemia, acute on chronic d/t anemia of GI bleed: Hb better after PRBC transfusions in ER.  serial f/u    3) Hx of A fib: monitor    4) DM 2: hold Glimepiride  ISS    5) CKD 3: monitor    6) DVT PPX: venodynes    IMPROVE VTE Individual Risk Assessment    RISK                                                                Points    [  ] Previous VTE                                                  3    [  ] Thrombophilia                                               2    [  ] Lower limb paralysis                                      2        (unable to hold up >15 seconds)      [  ] Current Cancer                                              2         (within 6 months)    [  ] Immobilization > 24 hrs                                1    [  ] ICU/CCU stay > 24 hours                              1    [ x ] Age > 60                                                      1    IMPROVE VTE Score ___1______    IMPROVE Score 0-1: Low Risk, No VTE prophylaxis required for most patients, encourage ambulation.   IMPROVE Score 2-3: At risk, pharmacologic VTE prophylaxis is indicated for most patients (in the absence of a contraindication)  IMPROVE Score > or = 4: High Risk, pharmacologic VTE prophylaxis is indicated for most patients (in the absence of a contraindication)    poc discussed at length with pt's wife , Amanda; son, Mejia, team 86/M with PMHx of CAD, AFib no on coumadin, CKD3, CHF, DM2, HLD, HTN, MR, TIA, Anemia, admitted with     1) Rectal Bleeding, painless: likely AVMs vs hemorrhoids vs colon CA  admit to med floor  had a detailed discussion with wife and son, they want conservative Mx at this time; no colonoscopy at this time  GI consult  hold asa  Active bleeding at splenic flexure on CTA abdo noted;   r/o rectal malignancy  npo except meds  iv PPI    2) Anemia, acute on chronic d/t anemia of GI bleed: Hb better after PRBC transfusions in ER.  serial f/u    3) Hx of A fib: monitor    4) DM 2: hold Glimepiride  ISS    5) CKD 3: monitor    6) DVT PPX: venodynes    IMPROVE VTE Individual Risk Assessment    RISK                                                                Points    [  ] Previous VTE                                                  3    [  ] Thrombophilia                                               2    [  ] Lower limb paralysis                                      2        (unable to hold up >15 seconds)      [  ] Current Cancer                                              2         (within 6 months)    [  ] Immobilization > 24 hrs                                1    [  ] ICU/CCU stay > 24 hours                              1    [ x ] Age > 60                                                      1    IMPROVE VTE Score ___1______    IMPROVE Score 0-1: Low Risk, No VTE prophylaxis required for most patients, encourage ambulation.   IMPROVE Score 2-3: At risk, pharmacologic VTE prophylaxis is indicated for most patients (in the absence of a contraindication)  IMPROVE Score > or = 4: High Risk, pharmacologic VTE prophylaxis is indicated for most patients (in the absence of a contraindication)    poc discussed at length with pt's wife , Amanda; son, Mejia, team

## 2019-06-21 NOTE — H&P ADULT - NSHPPHYSICALEXAM_GEN_ALL_CORE
PHYSICAL EXAM:    Daily Height in cm: 167.64 (20 Jun 2019 21:23)    Daily     ICU Vital Signs Last 24 Hrs  T(C): 36.7 (21 Jun 2019 07:20), Max: 37.8 (20 Jun 2019 21:23)  T(F): 98 (21 Jun 2019 07:20), Max: 100 (20 Jun 2019 21:23)  HR: 73 (21 Jun 2019 07:20) (62 - 85)  BP: 153/79 (21 Jun 2019 07:20) (96/56 - 153/87)  BP(mean): --  ABP: --  ABP(mean): --  RR: 17 (21 Jun 2019 07:20) (15 - 22)  SpO2: 98% (21 Jun 2019 07:20) (96% - 100%)      Constitutional: Weak appearing, does not talk at baseline  HEENT: Atraumatic, STEFFANIE, Normal, No congestion  Respiratory: Breath Sounds normal, no rhonchi/wheeze  Cardiovascular: N S1S2;   Gastrointestinal: Abdomen soft, non tender, Bowel Sounds present  Extremities: No edema, peripheral pulses present  Neurological: AAO x 0, no gross focal motor deficits  Skin: Non cellulitic, no rash, ulcers  Lymph Nodes: No lymphadenopathy noted  Back: No CVA tenderness   Musculoskeletal: non tender  Breasts: Deferred  Genitourinary: deferred  Rectal: Deferred

## 2019-06-21 NOTE — ED ADULT NURSE REASSESSMENT NOTE - NS ED NURSE REASSESS COMMENT FT1
Tolerated blood transfusion well. NO reaction noted. PT repositioned in bed and rechecked for bloody bowel movements. No blood or bowel movements noted. Will reassess and monitor. VSS. PT resting comfortable;family at bedside.

## 2019-06-21 NOTE — H&P ADULT - NSICDXPASTSURGICALHX_GEN_ALL_CORE_FT
PAST SURGICAL HISTORY:  H/O cystoscopy TURP    H/O heart surgery Clip    History of hernia surgery     History of tonsillectomy     S/P CABG x 4

## 2019-06-21 NOTE — H&P ADULT - NSHPLABSRESULTS_GEN_ALL_CORE
10.1   7.18  )-----------( 165      ( 2019 08:09 )             30.5       CBC Full  -  ( 2019 08:09 )  WBC Count : 7.18 K/uL  RBC Count : 3.33 M/uL  Hemoglobin : 10.1 g/dL  Hematocrit : 30.5 %  Platelet Count - Automated : 165 K/uL  Mean Cell Volume : 91.6 fl  Mean Cell Hemoglobin : 30.3 pg  Mean Cell Hemoglobin Concentration : 33.1 gm/dL  Auto Neutrophil # : x  Auto Lymphocyte # : x  Auto Monocyte # : x  Auto Eosinophil # : x  Auto Basophil # : x  Auto Neutrophil % : x  Auto Lymphocyte % : x  Auto Monocyte % : x  Auto Eosinophil % : x  Auto Basophil % : x      -20    141  |  108  |  57<H>  ----------------------------<  169<H>  5.0   |  26  |  1.47<H>    Ca    9.0      2019 21:49    TPro  6.3  /  Alb  3.0<L>  /  TBili  0.6  /  DBili  x   /  AST  30  /  ALT  33  /  AlkPhos  77  06-20      LIVER FUNCTIONS - ( 2019 21:49 )  Alb: 3.0 g/dL / Pro: 6.3 gm/dL / ALK PHOS: 77 U/L / ALT: 33 U/L / AST: 30 U/L / GGT: x             PT/INR - ( 2019 22:45 )   PT: 12.1 sec;   INR: 1.09 ratio         PTT - ( 2019 22:45 )  PTT:29.9 sec          Urinalysis Basic - ( 2019 00:30 )    Color: Yellow / Appearance: Clear / S.020 / pH: x  Gluc: x / Ketone: Negative  / Bili: Negative / Urobili: Negative mg/dL   Blood: x / Protein: 100 mg/dL / Nitrite: Negative   Leuk Esterase: Negative / RBC: 0-2 /HPF / WBC 0-2   Sq Epi: x / Non Sq Epi: Negative / Bacteria: Few      < from: CT Angio Abdomen and Pelvis w/ IV Cont (19 @ 02:39) >    IMPRESSION:   1. Cholelithiasis..   2. Markedly abnormal prostate.   3. Slightly atypical concentric thickening of lower rectum similar to   2018. No adjacent edema. Differential includes neoplasm.   4. Small bowel relatively decompressed. Moderate stool in ascending   colon.   Fluid in transverse and descending colon. No endoluminal contrast   accumulation   to suggest active GI bleed..   5. Extensive atherosclerotic calcification. No abdominal aortic aneurysm   or   dissection. No major branch vessel occlusions.    < end of copied text >          MEDICATIONS  (STANDING):  dextrose 5% + sodium chloride 0.9%. 1000 milliLiter(s) (50 mL/Hr) IV Continuous <Continuous>  dextrose 5%. 1000 milliLiter(s) (50 mL/Hr) IV Continuous <Continuous>  dextrose 50% Injectable 12.5 Gram(s) IV Push once  dextrose 50% Injectable 25 Gram(s) IV Push once  dextrose 50% Injectable 25 Gram(s) IV Push once  finasteride 5 milliGRAM(s) Oral daily  insulin lispro (HumaLOG) corrective regimen sliding scale   SubCutaneous three times a day before meals  insulin lispro (HumaLOG) corrective regimen sliding scale   SubCutaneous at bedtime  pantoprazole  Injectable 40 milliGRAM(s) IV Push every 12 hours  simvastatin 40 milliGRAM(s) Oral at bedtime  tamsulosin 0.4 milliGRAM(s) Oral at bedtime

## 2019-06-21 NOTE — H&P ADULT - HISTORY OF PRESENT ILLNESS
86/M, does not give any Hx; hx obtained from chart, family.  86/M with PMHx of CAD, AFib no on coumadin, CKD3, CHF, DM2, HLD, HTN, MR, TIA, Anemia brought to ER for c/o rectal bleeding.  Patient had a BM at home, with bright red blood that tracked up his back.  Had another bloody movement here in ED.  Has h/o AVM with rectal bleeding in the past, but as per family has been many years.  Last endoscopy/colonoscopy a few years ago.   Pt on asa 325 daily. 86/M, does not give any Hx; hx obtained from chart, family.  86/M with PMHx of CAD, AFib no on coumadin, CKD3, CHF, DM2, HLD, HTN, MR, TIA, Anemia brought to ER for c/o rectal bleeding.  Patient had a BM at home, with bright red blood that tracked up his back.  Had another bloody movement here in ED.  Has h/o AVM with rectal bleeding in the past, but as per family has been many years.  Last endoscopy/colonoscopy a few years ago.   Pt on asa 325 daily.     Dr. Man ( [facility]) called at 10:28 am to report that there was Active bleeding at splenic Flexure on CTA abdo. Prelim report from [facility] stated No Active Bleeding.

## 2019-06-21 NOTE — CONSULT NOTE ADULT - ASSESSMENT
Imp:  GI bleed from the splenic flexure -- ?diverticular    Rec:  Supportive care for now  Family prefers non-invasive approach but would agree to more intervention if needed  Clear liquids

## 2019-06-21 NOTE — ED ADULT NURSE REASSESSMENT NOTE - NS ED NURSE REASSESS COMMENT FT1
PT family member stated "I smell something, I think he maybe did something". PT was checked for bowel movement and bleeding. No bleeding/bowel movement noted. Will monitor. VSS.

## 2019-06-21 NOTE — H&P ADULT - NSICDXPASTMEDICALHX_GEN_ALL_CORE_FT
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

## 2019-06-22 DIAGNOSIS — I25.10 ATHEROSCLEROTIC HEART DISEASE OF NATIVE CORONARY ARTERY WITHOUT ANGINA PECTORIS: ICD-10-CM

## 2019-06-22 DIAGNOSIS — K92.2 GASTROINTESTINAL HEMORRHAGE, UNSPECIFIED: ICD-10-CM

## 2019-06-22 DIAGNOSIS — I50.9 HEART FAILURE, UNSPECIFIED: ICD-10-CM

## 2019-06-22 DIAGNOSIS — D50.0 IRON DEFICIENCY ANEMIA SECONDARY TO BLOOD LOSS (CHRONIC): ICD-10-CM

## 2019-06-22 DIAGNOSIS — Z13.1 ENCOUNTER FOR SCREENING FOR DIABETES MELLITUS: ICD-10-CM

## 2019-06-22 DIAGNOSIS — I34.0 NONRHEUMATIC MITRAL (VALVE) INSUFFICIENCY: ICD-10-CM

## 2019-06-22 DIAGNOSIS — N18.9 CHRONIC KIDNEY DISEASE, UNSPECIFIED: ICD-10-CM

## 2019-06-22 DIAGNOSIS — G47.33 OBSTRUCTIVE SLEEP APNEA (ADULT) (PEDIATRIC): ICD-10-CM

## 2019-06-22 LAB
GLUCOSE BLDC GLUCOMTR-MCNC: 117 MG/DL — HIGH (ref 70–99)
GLUCOSE BLDC GLUCOMTR-MCNC: 181 MG/DL — HIGH (ref 70–99)
GLUCOSE BLDC GLUCOMTR-MCNC: 216 MG/DL — HIGH (ref 70–99)
GLUCOSE BLDC GLUCOMTR-MCNC: 70 MG/DL — SIGNIFICANT CHANGE UP (ref 70–99)
HBA1C BLD-MCNC: 5.8 % — HIGH (ref 4–5.6)
HCT VFR BLD CALC: 26.6 % — LOW (ref 39–50)
HGB BLD-MCNC: 9 G/DL — LOW (ref 13–17)
MCHC RBC-ENTMCNC: 31.1 PG — SIGNIFICANT CHANGE UP (ref 27–34)
MCHC RBC-ENTMCNC: 33.8 GM/DL — SIGNIFICANT CHANGE UP (ref 32–36)
MCV RBC AUTO: 92 FL — SIGNIFICANT CHANGE UP (ref 80–100)
PLATELET # BLD AUTO: 134 K/UL — LOW (ref 150–400)
RBC # BLD: 2.89 M/UL — LOW (ref 4.2–5.8)
RBC # FLD: 16.4 % — HIGH (ref 10.3–14.5)
WBC # BLD: 6.29 K/UL — SIGNIFICANT CHANGE UP (ref 3.8–10.5)
WBC # FLD AUTO: 6.29 K/UL — SIGNIFICANT CHANGE UP (ref 3.8–10.5)

## 2019-06-22 RX ADMIN — Medication 4: at 17:40

## 2019-06-22 RX ADMIN — PANTOPRAZOLE SODIUM 40 MILLIGRAM(S): 20 TABLET, DELAYED RELEASE ORAL at 17:40

## 2019-06-22 RX ADMIN — Medication 2: at 06:19

## 2019-06-22 RX ADMIN — TAMSULOSIN HYDROCHLORIDE 0.4 MILLIGRAM(S): 0.4 CAPSULE ORAL at 22:02

## 2019-06-22 RX ADMIN — SIMVASTATIN 40 MILLIGRAM(S): 20 TABLET, FILM COATED ORAL at 22:02

## 2019-06-22 RX ADMIN — PANTOPRAZOLE SODIUM 40 MILLIGRAM(S): 20 TABLET, DELAYED RELEASE ORAL at 05:56

## 2019-06-22 RX ADMIN — SODIUM CHLORIDE 50 MILLILITER(S): 9 INJECTION, SOLUTION INTRAVENOUS at 04:01

## 2019-06-22 NOTE — DIETITIAN INITIAL EVALUATION ADULT. - NUTRITIONGOAL OUTCOME1
Clinic hours for Dr. Ruiz:  MONDAY   WE ARE OUT OF THE OFFICE  TUESDAY  8:00 A.M. - 4:00 P.M.  WEDNESDAY  7:00 A.M. - 5:00 P.M.   THURSDAY  8:00 A.M. - 4:00 P.M.  FRIDAY    8:00 A.M. - 4:00 P.M.    [address] [place] [address]                      Phone: [phone]                  If you need a refill on your prescription please call your pharmacy and let them know. Please be proactive and call before your medication runs out. The pharmacy will then contact us for the refill. Please allow 24-48 hours for the refill to be processed.      If your physician has ordered additional laboratory or radiology testing as part of your ongoing plan of care, please allow 5-7 business days from the day of your lab draw or test for the results to be sent and reviewed by your provider. If your results are critical and require more immediate intervention, you will be contacted sooner. Your results will be conveyed to you via a phone call or letter.    If your appointment is for an annual physical please fast for 8-12 hours before your appointment.      If you are a [employer] user-Test results may be posted within a few hours or a few weeks, depending on the test. Once your test results are available for viewing, you will receive an e-mail stating that you have a test result, which is ready for review.  Not all tests result at the same time.  Your office may wait to reach out to you once ALL results are final. Please keep in mind, your doctor may not always have had the opportunity to review your results before they were released to your [employer] account.     You may receive a patient satisfaction survey in the mail. Please take the time to complete, as your feedback is very important to us. We strive to make your experience exceptional and your comments help us with that goal. We look forward to hearing from you.    [facility]     [address] [place] [address]  [place]  [address]               [phone]  Hours of Operation:   Monday - Friday 700 a.m. -1000 p.m.  Saturday and Sunday 800 a.m. - 400 p.m.  Holiday Hours Vary. Please call the clinic for Holiday hours.     pt resume nutr source and meet >80% of estimated nutr needs with tolerance

## 2019-06-22 NOTE — DIETITIAN INITIAL EVALUATION ADULT. - PERTINENT LABORATORY DATA
06-21 Na143 mmol/L Glu 200 mg/dL<H> K+ 5.1 mmol/L Cr  1.46 mg/dL<H> BUN 66 mg/dL<H> Phos n/a   Alb n/a   PAB n/a

## 2019-06-22 NOTE — DIETITIAN INITIAL EVALUATION ADULT. - PERTINENT MEDS FT
MEDICATIONS  (STANDING):  dextrose 5% + sodium chloride 0.9%. 1000 milliLiter(s) (50 mL/Hr) IV Continuous <Continuous>  dextrose 5%. 1000 milliLiter(s) (50 mL/Hr) IV Continuous <Continuous>  dextrose 50% Injectable 12.5 Gram(s) IV Push once  dextrose 50% Injectable 25 Gram(s) IV Push once  dextrose 50% Injectable 25 Gram(s) IV Push once  finasteride 5 milliGRAM(s) Oral daily  insulin lispro (HumaLOG) corrective regimen sliding scale   SubCutaneous three times a day before meals  insulin lispro (HumaLOG) corrective regimen sliding scale   SubCutaneous at bedtime  pantoprazole  Injectable 40 milliGRAM(s) IV Push every 12 hours  simvastatin 40 milliGRAM(s) Oral at bedtime  tamsulosin 0.4 milliGRAM(s) Oral at bedtime    MEDICATIONS  (PRN):  dextrose 40% Gel 15 Gram(s) Oral once PRN Blood Glucose LESS THAN 70 milliGRAM(s)/deciliter  glucagon  Injectable 1 milliGRAM(s) IntraMuscular once PRN Glucose LESS THAN 70 milligrams/deciliter  ondansetron Injectable 4 milliGRAM(s) IV Push every 6 hours PRN Nausea

## 2019-06-22 NOTE — CHART NOTE - NSCHARTNOTEFT_GEN_A_CORE
Upon Nutritional Assessment by the Registered Dietitian your patient was determined to meet criteria / has evidence of the following diagnosis/diagnoses:          [ ]  Mild Protein Calorie Malnutrition        [ ]  Moderate Protein Calorie Malnutrition        [x] Severe Protein Calorie Malnutrition        [ ] Unspecified Protein Calorie Malnutrition        [x] Underweight / BMI <19        [ ] Morbid Obesity / BMI > 40      Findings:  Upon visit, pt appears underwt with BMI of 17.  NFPE significant for severe muscle wasting; temporal, clavicle, deltoid, thigh, and calf.  severe fat wasting; rib and tricep.  EMR shows wt loss of 65# (37%) over the past 14 months, clinically significant.  pt unable to provide diet recall or any information.  Pt was not verbally responsive.  no edema notea.  BM (+) 6/21.  angela score of 10, no PU noted.  based on above, pt meets criteria for severe malnutrition in chronic illness (severe muscle/fat wasting, BMI 17, 37% wt loss x 14 months).    Findings as based on:  •  Comprehensive nutrition assessment and consultation  •  Calorie counts (nutrient intake analysis)  •  Food acceptance and intake status from observations by staff  •  Follow up  •  Patient education  •  Intervention secondary to interdisciplinary rounds  •   concerns      Treatment:    The following diet has been recommended:  1) if pt is able to use GI tract and SLP approves PO diet, advance diet as tolerated with texture per SLP and ensure enlive BID and gelatein BID   2) if pt is unable to use GI tract within 5 days, consider PN and re-consult for rec's   3) daily wt checks   4) strict I/O's   5) monitor lytes/mins; replete/correct prn    PROVIDER Section:     By signing this assessment you are acknowledging and agree with the diagnosis/diagnoses assigned by the Registered Dietitian    Comments:

## 2019-06-22 NOTE — DIETITIAN INITIAL EVALUATION ADULT. - OTHER INFO
received consult for decreased PO intake; 85yo male with PMH of CAD, CKD, CHF, DM, hearing loss, hiatal hernia, HLD, HTN, TIA, wt loss p/w rectal bleed likely AVMs vs hemmorrhoids vs colon CA.  GI seen pt, GIB from splenic flexure (diverticular?).   Upon visit, pt appears underwt with BMI of 17.  NFPE significant for severe muscle wasting; temporal, clavicle, deltoid, thigh, and calf.  severe fat wasting; rib and tricep.  EMR shows wt loss of 65# (37%) over the past 14 months, clinically significant.  pt unable to provide diet recall or any information.  Pt was not verbally responsive.  no edema notea.  BM (+) 6/21.  angela score of 10, no PU noted.  based on above, pt meets criteria for severe malnutrition in chronic illness (severe muscle/fat wasting, BMI 17, 37% wt loss x 14 months).  labs reviewed; potassium of 5.1.  -192.  MOLST reviewed: DNR/DNI, no feeding tube.  RECOMMENDATIONS: 1) if pt is able to use GI tract and SLP approves PO diet, advance diet as tolerated with texture per SLP and ensure enlive BID and gelatein BID 2) if pt is unable to use GI tract within 5 days, consider PN and re-consult for rec's 3) daily wt checks 4) strict I/O's 5) monitor lytes/mins; replete/correct prn

## 2019-06-22 NOTE — DIETITIAN INITIAL EVALUATION ADULT. - PHYSICAL APPEARANCE
other (specify)/NFPE significant for severe muscle wasting; temporal, clavicle, deltoid, thigh, and calf.  severe fat wasting; rib and tricep./underweight

## 2019-06-23 DIAGNOSIS — E87.0 HYPEROSMOLALITY AND HYPERNATREMIA: ICD-10-CM

## 2019-06-23 LAB
ANION GAP SERPL CALC-SCNC: 6 MMOL/L — SIGNIFICANT CHANGE UP (ref 5–17)
BUN SERPL-MCNC: 37 MG/DL — HIGH (ref 7–23)
CALCIUM SERPL-MCNC: 9.3 MG/DL — SIGNIFICANT CHANGE UP (ref 8.5–10.1)
CHLORIDE SERPL-SCNC: 119 MMOL/L — HIGH (ref 96–108)
CO2 SERPL-SCNC: 24 MMOL/L — SIGNIFICANT CHANGE UP (ref 22–31)
CREAT SERPL-MCNC: 1.18 MG/DL — SIGNIFICANT CHANGE UP (ref 0.5–1.3)
GLUCOSE BLDC GLUCOMTR-MCNC: 158 MG/DL — HIGH (ref 70–99)
GLUCOSE BLDC GLUCOMTR-MCNC: 236 MG/DL — HIGH (ref 70–99)
GLUCOSE BLDC GLUCOMTR-MCNC: 238 MG/DL — HIGH (ref 70–99)
GLUCOSE BLDC GLUCOMTR-MCNC: 87 MG/DL — SIGNIFICANT CHANGE UP (ref 70–99)
GLUCOSE SERPL-MCNC: 157 MG/DL — HIGH (ref 70–99)
HCT VFR BLD CALC: 26.4 % — LOW (ref 39–50)
HGB BLD-MCNC: 8.5 G/DL — LOW (ref 13–17)
MCHC RBC-ENTMCNC: 30.1 PG — SIGNIFICANT CHANGE UP (ref 27–34)
MCHC RBC-ENTMCNC: 32.2 GM/DL — SIGNIFICANT CHANGE UP (ref 32–36)
MCV RBC AUTO: 93.6 FL — SIGNIFICANT CHANGE UP (ref 80–100)
PLATELET # BLD AUTO: 130 K/UL — LOW (ref 150–400)
POTASSIUM SERPL-MCNC: 4.2 MMOL/L — SIGNIFICANT CHANGE UP (ref 3.5–5.3)
POTASSIUM SERPL-SCNC: 4.2 MMOL/L — SIGNIFICANT CHANGE UP (ref 3.5–5.3)
RBC # BLD: 2.82 M/UL — LOW (ref 4.2–5.8)
RBC # FLD: 16.4 % — HIGH (ref 10.3–14.5)
SODIUM SERPL-SCNC: 149 MMOL/L — HIGH (ref 135–145)
WBC # BLD: 5.05 K/UL — SIGNIFICANT CHANGE UP (ref 3.8–10.5)
WBC # FLD AUTO: 5.05 K/UL — SIGNIFICANT CHANGE UP (ref 3.8–10.5)

## 2019-06-23 PROCEDURE — 99232 SBSQ HOSP IP/OBS MODERATE 35: CPT

## 2019-06-23 RX ORDER — SODIUM CHLORIDE 9 MG/ML
1000 INJECTION, SOLUTION INTRAVENOUS
Refills: 0 | Status: DISCONTINUED | OUTPATIENT
Start: 2019-06-23 | End: 2019-06-26

## 2019-06-23 RX ADMIN — Medication 2: at 08:20

## 2019-06-23 RX ADMIN — SIMVASTATIN 40 MILLIGRAM(S): 20 TABLET, FILM COATED ORAL at 21:51

## 2019-06-23 RX ADMIN — Medication 4: at 17:01

## 2019-06-23 RX ADMIN — PANTOPRAZOLE SODIUM 40 MILLIGRAM(S): 20 TABLET, DELAYED RELEASE ORAL at 17:02

## 2019-06-23 RX ADMIN — FINASTERIDE 5 MILLIGRAM(S): 5 TABLET, FILM COATED ORAL at 12:37

## 2019-06-23 RX ADMIN — PANTOPRAZOLE SODIUM 40 MILLIGRAM(S): 20 TABLET, DELAYED RELEASE ORAL at 05:11

## 2019-06-23 RX ADMIN — Medication 4: at 12:36

## 2019-06-23 RX ADMIN — SODIUM CHLORIDE 50 MILLILITER(S): 9 INJECTION, SOLUTION INTRAVENOUS at 05:11

## 2019-06-23 RX ADMIN — SODIUM CHLORIDE 75 MILLILITER(S): 9 INJECTION, SOLUTION INTRAVENOUS at 17:01

## 2019-06-23 RX ADMIN — TAMSULOSIN HYDROCHLORIDE 0.4 MILLIGRAM(S): 0.4 CAPSULE ORAL at 21:51

## 2019-06-24 LAB
ANION GAP SERPL CALC-SCNC: 6 MMOL/L — SIGNIFICANT CHANGE UP (ref 5–17)
BLD GP AB SCN SERPL QL: SIGNIFICANT CHANGE UP
BUN SERPL-MCNC: 28 MG/DL — HIGH (ref 7–23)
CALCIUM SERPL-MCNC: 8.7 MG/DL — SIGNIFICANT CHANGE UP (ref 8.5–10.1)
CHLORIDE SERPL-SCNC: 115 MMOL/L — HIGH (ref 96–108)
CO2 SERPL-SCNC: 25 MMOL/L — SIGNIFICANT CHANGE UP (ref 22–31)
CREAT SERPL-MCNC: 1.1 MG/DL — SIGNIFICANT CHANGE UP (ref 0.5–1.3)
GLUCOSE BLDC GLUCOMTR-MCNC: 102 MG/DL — HIGH (ref 70–99)
GLUCOSE BLDC GLUCOMTR-MCNC: 109 MG/DL — HIGH (ref 70–99)
GLUCOSE BLDC GLUCOMTR-MCNC: 130 MG/DL — HIGH (ref 70–99)
GLUCOSE BLDC GLUCOMTR-MCNC: 315 MG/DL — HIGH (ref 70–99)
GLUCOSE SERPL-MCNC: 94 MG/DL — SIGNIFICANT CHANGE UP (ref 70–99)
HCT VFR BLD CALC: 25 % — LOW (ref 39–50)
HGB BLD-MCNC: 8 G/DL — LOW (ref 13–17)
MCHC RBC-ENTMCNC: 30.2 PG — SIGNIFICANT CHANGE UP (ref 27–34)
MCHC RBC-ENTMCNC: 32 GM/DL — SIGNIFICANT CHANGE UP (ref 32–36)
MCV RBC AUTO: 94.3 FL — SIGNIFICANT CHANGE UP (ref 80–100)
PLATELET # BLD AUTO: 120 K/UL — LOW (ref 150–400)
POTASSIUM SERPL-MCNC: 3.9 MMOL/L — SIGNIFICANT CHANGE UP (ref 3.5–5.3)
POTASSIUM SERPL-SCNC: 3.9 MMOL/L — SIGNIFICANT CHANGE UP (ref 3.5–5.3)
RBC # BLD: 2.65 M/UL — LOW (ref 4.2–5.8)
RBC # FLD: 16.3 % — HIGH (ref 10.3–14.5)
SODIUM SERPL-SCNC: 146 MMOL/L — HIGH (ref 135–145)
TYPE + AB SCN PNL BLD: SIGNIFICANT CHANGE UP
WBC # BLD: 4.84 K/UL — SIGNIFICANT CHANGE UP (ref 3.8–10.5)
WBC # FLD AUTO: 4.84 K/UL — SIGNIFICANT CHANGE UP (ref 3.8–10.5)

## 2019-06-24 RX ORDER — CIPROFLOXACIN HCL 0.3 %
1 DROPS OPHTHALMIC (EYE)
Refills: 0 | Status: DISCONTINUED | OUTPATIENT
Start: 2019-06-24 | End: 2019-07-02

## 2019-06-24 RX ADMIN — PANTOPRAZOLE SODIUM 40 MILLIGRAM(S): 20 TABLET, DELAYED RELEASE ORAL at 17:26

## 2019-06-24 RX ADMIN — Medication 1 DROP(S): at 22:30

## 2019-06-24 RX ADMIN — FINASTERIDE 5 MILLIGRAM(S): 5 TABLET, FILM COATED ORAL at 11:41

## 2019-06-24 RX ADMIN — Medication 1 DROP(S): at 17:25

## 2019-06-24 RX ADMIN — PANTOPRAZOLE SODIUM 40 MILLIGRAM(S): 20 TABLET, DELAYED RELEASE ORAL at 04:59

## 2019-06-24 RX ADMIN — SODIUM CHLORIDE 75 MILLILITER(S): 9 INJECTION, SOLUTION INTRAVENOUS at 17:26

## 2019-06-24 RX ADMIN — SODIUM CHLORIDE 75 MILLILITER(S): 9 INJECTION, SOLUTION INTRAVENOUS at 04:59

## 2019-06-24 RX ADMIN — Medication 2: at 22:31

## 2019-06-25 LAB
ANION GAP SERPL CALC-SCNC: 5 MMOL/L — SIGNIFICANT CHANGE UP (ref 5–17)
BUN SERPL-MCNC: 24 MG/DL — HIGH (ref 7–23)
CALCIUM SERPL-MCNC: 8.6 MG/DL — SIGNIFICANT CHANGE UP (ref 8.5–10.1)
CHLORIDE SERPL-SCNC: 110 MMOL/L — HIGH (ref 96–108)
CO2 SERPL-SCNC: 25 MMOL/L — SIGNIFICANT CHANGE UP (ref 22–31)
CREAT SERPL-MCNC: 1.05 MG/DL — SIGNIFICANT CHANGE UP (ref 0.5–1.3)
GLUCOSE BLDC GLUCOMTR-MCNC: 107 MG/DL — HIGH (ref 70–99)
GLUCOSE BLDC GLUCOMTR-MCNC: 129 MG/DL — HIGH (ref 70–99)
GLUCOSE BLDC GLUCOMTR-MCNC: 206 MG/DL — HIGH (ref 70–99)
GLUCOSE BLDC GLUCOMTR-MCNC: 240 MG/DL — HIGH (ref 70–99)
GLUCOSE SERPL-MCNC: 90 MG/DL — SIGNIFICANT CHANGE UP (ref 70–99)
HCT VFR BLD CALC: 27.8 % — LOW (ref 39–50)
HCT VFR BLD CALC: 30.3 % — LOW (ref 39–50)
HGB BLD-MCNC: 10.1 G/DL — LOW (ref 13–17)
HGB BLD-MCNC: 9.3 G/DL — LOW (ref 13–17)
POTASSIUM SERPL-MCNC: 4.1 MMOL/L — SIGNIFICANT CHANGE UP (ref 3.5–5.3)
POTASSIUM SERPL-SCNC: 4.1 MMOL/L — SIGNIFICANT CHANGE UP (ref 3.5–5.3)
SODIUM SERPL-SCNC: 140 MMOL/L — SIGNIFICANT CHANGE UP (ref 135–145)

## 2019-06-25 RX ORDER — LISINOPRIL 2.5 MG/1
2.5 TABLET ORAL DAILY
Refills: 0 | Status: DISCONTINUED | OUTPATIENT
Start: 2019-06-25 | End: 2019-06-27

## 2019-06-25 RX ADMIN — Medication 1 DROP(S): at 17:35

## 2019-06-25 RX ADMIN — SODIUM CHLORIDE 75 MILLILITER(S): 9 INJECTION, SOLUTION INTRAVENOUS at 21:22

## 2019-06-25 RX ADMIN — PANTOPRAZOLE SODIUM 40 MILLIGRAM(S): 20 TABLET, DELAYED RELEASE ORAL at 06:22

## 2019-06-25 RX ADMIN — SIMVASTATIN 40 MILLIGRAM(S): 20 TABLET, FILM COATED ORAL at 21:23

## 2019-06-25 RX ADMIN — Medication 1 DROP(S): at 12:05

## 2019-06-25 RX ADMIN — FINASTERIDE 5 MILLIGRAM(S): 5 TABLET, FILM COATED ORAL at 12:06

## 2019-06-25 RX ADMIN — LISINOPRIL 2.5 MILLIGRAM(S): 2.5 TABLET ORAL at 18:06

## 2019-06-25 RX ADMIN — Medication 1 DROP(S): at 06:22

## 2019-06-25 RX ADMIN — Medication 4: at 17:34

## 2019-06-25 RX ADMIN — Medication 1 DROP(S): at 21:22

## 2019-06-25 RX ADMIN — TAMSULOSIN HYDROCHLORIDE 0.4 MILLIGRAM(S): 0.4 CAPSULE ORAL at 21:23

## 2019-06-25 RX ADMIN — PANTOPRAZOLE SODIUM 40 MILLIGRAM(S): 20 TABLET, DELAYED RELEASE ORAL at 17:34

## 2019-06-26 LAB
GLUCOSE BLDC GLUCOMTR-MCNC: 118 MG/DL — HIGH (ref 70–99)
GLUCOSE BLDC GLUCOMTR-MCNC: 118 MG/DL — HIGH (ref 70–99)
GLUCOSE BLDC GLUCOMTR-MCNC: 178 MG/DL — HIGH (ref 70–99)
GLUCOSE BLDC GLUCOMTR-MCNC: 197 MG/DL — HIGH (ref 70–99)
HCT VFR BLD CALC: 30.5 % — LOW (ref 39–50)
HGB BLD-MCNC: 10 G/DL — LOW (ref 13–17)

## 2019-06-26 RX ORDER — PANTOPRAZOLE SODIUM 20 MG/1
40 TABLET, DELAYED RELEASE ORAL
Refills: 0 | Status: DISCONTINUED | OUTPATIENT
Start: 2019-06-26 | End: 2019-07-02

## 2019-06-26 RX ADMIN — LISINOPRIL 2.5 MILLIGRAM(S): 2.5 TABLET ORAL at 05:31

## 2019-06-26 RX ADMIN — PANTOPRAZOLE SODIUM 40 MILLIGRAM(S): 20 TABLET, DELAYED RELEASE ORAL at 18:23

## 2019-06-26 RX ADMIN — Medication 2: at 18:23

## 2019-06-26 RX ADMIN — FINASTERIDE 5 MILLIGRAM(S): 5 TABLET, FILM COATED ORAL at 11:31

## 2019-06-26 RX ADMIN — PANTOPRAZOLE SODIUM 40 MILLIGRAM(S): 20 TABLET, DELAYED RELEASE ORAL at 05:32

## 2019-06-26 RX ADMIN — Medication 1 DROP(S): at 11:31

## 2019-06-26 RX ADMIN — SIMVASTATIN 40 MILLIGRAM(S): 20 TABLET, FILM COATED ORAL at 23:09

## 2019-06-26 RX ADMIN — TAMSULOSIN HYDROCHLORIDE 0.4 MILLIGRAM(S): 0.4 CAPSULE ORAL at 23:09

## 2019-06-26 RX ADMIN — Medication 1 DROP(S): at 23:09

## 2019-06-26 RX ADMIN — Medication 1 DROP(S): at 05:31

## 2019-06-26 RX ADMIN — SODIUM CHLORIDE 75 MILLILITER(S): 9 INJECTION, SOLUTION INTRAVENOUS at 11:32

## 2019-06-26 RX ADMIN — Medication 1 DROP(S): at 18:23

## 2019-06-27 LAB
GLUCOSE BLDC GLUCOMTR-MCNC: 121 MG/DL — HIGH (ref 70–99)
GLUCOSE BLDC GLUCOMTR-MCNC: 122 MG/DL — HIGH (ref 70–99)
GLUCOSE BLDC GLUCOMTR-MCNC: 140 MG/DL — HIGH (ref 70–99)
GLUCOSE BLDC GLUCOMTR-MCNC: 231 MG/DL — HIGH (ref 70–99)
HCT VFR BLD CALC: 30.2 % — LOW (ref 39–50)
HGB BLD-MCNC: 9.9 G/DL — LOW (ref 13–17)

## 2019-06-27 RX ORDER — LISINOPRIL 2.5 MG/1
10 TABLET ORAL DAILY
Refills: 0 | Status: DISCONTINUED | OUTPATIENT
Start: 2019-06-27 | End: 2019-06-29

## 2019-06-27 RX ORDER — QUETIAPINE FUMARATE 200 MG/1
25 TABLET, FILM COATED ORAL
Refills: 0 | Status: DISCONTINUED | OUTPATIENT
Start: 2019-06-27 | End: 2019-07-02

## 2019-06-27 RX ORDER — LISINOPRIL 2.5 MG/1
5 TABLET ORAL ONCE
Refills: 0 | Status: COMPLETED | OUTPATIENT
Start: 2019-06-27 | End: 2019-06-27

## 2019-06-27 RX ADMIN — Medication 1 DROP(S): at 06:10

## 2019-06-27 RX ADMIN — TAMSULOSIN HYDROCHLORIDE 0.4 MILLIGRAM(S): 0.4 CAPSULE ORAL at 23:05

## 2019-06-27 RX ADMIN — Medication 1 DROP(S): at 18:11

## 2019-06-27 RX ADMIN — LISINOPRIL 10 MILLIGRAM(S): 2.5 TABLET ORAL at 23:06

## 2019-06-27 RX ADMIN — LISINOPRIL 2.5 MILLIGRAM(S): 2.5 TABLET ORAL at 06:10

## 2019-06-27 RX ADMIN — LISINOPRIL 5 MILLIGRAM(S): 2.5 TABLET ORAL at 18:06

## 2019-06-27 RX ADMIN — SIMVASTATIN 40 MILLIGRAM(S): 20 TABLET, FILM COATED ORAL at 23:05

## 2019-06-27 RX ADMIN — PANTOPRAZOLE SODIUM 40 MILLIGRAM(S): 20 TABLET, DELAYED RELEASE ORAL at 06:10

## 2019-06-27 RX ADMIN — Medication 4: at 18:09

## 2019-06-27 RX ADMIN — PANTOPRAZOLE SODIUM 40 MILLIGRAM(S): 20 TABLET, DELAYED RELEASE ORAL at 18:11

## 2019-06-27 RX ADMIN — Medication 1 DROP(S): at 12:11

## 2019-06-27 RX ADMIN — FINASTERIDE 5 MILLIGRAM(S): 5 TABLET, FILM COATED ORAL at 12:16

## 2019-06-27 RX ADMIN — Medication 1 DROP(S): at 23:05

## 2019-06-28 LAB
GLUCOSE BLDC GLUCOMTR-MCNC: 144 MG/DL — HIGH (ref 70–99)
GLUCOSE BLDC GLUCOMTR-MCNC: 153 MG/DL — HIGH (ref 70–99)
GLUCOSE BLDC GLUCOMTR-MCNC: 194 MG/DL — HIGH (ref 70–99)
GLUCOSE BLDC GLUCOMTR-MCNC: 76 MG/DL — SIGNIFICANT CHANGE UP (ref 70–99)

## 2019-06-28 RX ADMIN — Medication 1 DROP(S): at 23:29

## 2019-06-28 RX ADMIN — FINASTERIDE 5 MILLIGRAM(S): 5 TABLET, FILM COATED ORAL at 11:56

## 2019-06-28 RX ADMIN — Medication 1 DROP(S): at 05:48

## 2019-06-28 RX ADMIN — PANTOPRAZOLE SODIUM 40 MILLIGRAM(S): 20 TABLET, DELAYED RELEASE ORAL at 17:01

## 2019-06-28 RX ADMIN — LISINOPRIL 10 MILLIGRAM(S): 2.5 TABLET ORAL at 06:29

## 2019-06-28 RX ADMIN — SIMVASTATIN 40 MILLIGRAM(S): 20 TABLET, FILM COATED ORAL at 22:25

## 2019-06-28 RX ADMIN — Medication 1 DROP(S): at 17:01

## 2019-06-28 RX ADMIN — PANTOPRAZOLE SODIUM 40 MILLIGRAM(S): 20 TABLET, DELAYED RELEASE ORAL at 05:47

## 2019-06-28 RX ADMIN — Medication 2: at 07:38

## 2019-06-28 RX ADMIN — Medication 1 DROP(S): at 11:56

## 2019-06-28 RX ADMIN — TAMSULOSIN HYDROCHLORIDE 0.4 MILLIGRAM(S): 0.4 CAPSULE ORAL at 22:26

## 2019-06-29 LAB
ANION GAP SERPL CALC-SCNC: 7 MMOL/L — SIGNIFICANT CHANGE UP (ref 5–17)
BUN SERPL-MCNC: 34 MG/DL — HIGH (ref 7–23)
CALCIUM SERPL-MCNC: 9.3 MG/DL — SIGNIFICANT CHANGE UP (ref 8.5–10.1)
CHLORIDE SERPL-SCNC: 110 MMOL/L — HIGH (ref 96–108)
CO2 SERPL-SCNC: 26 MMOL/L — SIGNIFICANT CHANGE UP (ref 22–31)
CREAT SERPL-MCNC: 1.2 MG/DL — SIGNIFICANT CHANGE UP (ref 0.5–1.3)
GLUCOSE BLDC GLUCOMTR-MCNC: 136 MG/DL — HIGH (ref 70–99)
GLUCOSE BLDC GLUCOMTR-MCNC: 139 MG/DL — HIGH (ref 70–99)
GLUCOSE BLDC GLUCOMTR-MCNC: 168 MG/DL — HIGH (ref 70–99)
GLUCOSE BLDC GLUCOMTR-MCNC: 228 MG/DL — HIGH (ref 70–99)
GLUCOSE SERPL-MCNC: 165 MG/DL — HIGH (ref 70–99)
HCT VFR BLD CALC: 29.8 % — LOW (ref 39–50)
HGB BLD-MCNC: 9.8 G/DL — LOW (ref 13–17)
MCHC RBC-ENTMCNC: 30.2 PG — SIGNIFICANT CHANGE UP (ref 27–34)
MCHC RBC-ENTMCNC: 32.9 GM/DL — SIGNIFICANT CHANGE UP (ref 32–36)
MCV RBC AUTO: 92 FL — SIGNIFICANT CHANGE UP (ref 80–100)
PLATELET # BLD AUTO: 133 K/UL — LOW (ref 150–400)
POTASSIUM SERPL-MCNC: 4.4 MMOL/L — SIGNIFICANT CHANGE UP (ref 3.5–5.3)
POTASSIUM SERPL-SCNC: 4.4 MMOL/L — SIGNIFICANT CHANGE UP (ref 3.5–5.3)
RBC # BLD: 3.24 M/UL — LOW (ref 4.2–5.8)
RBC # FLD: 16.6 % — HIGH (ref 10.3–14.5)
SODIUM SERPL-SCNC: 143 MMOL/L — SIGNIFICANT CHANGE UP (ref 135–145)
WBC # BLD: 5.33 K/UL — SIGNIFICANT CHANGE UP (ref 3.8–10.5)
WBC # FLD AUTO: 5.33 K/UL — SIGNIFICANT CHANGE UP (ref 3.8–10.5)

## 2019-06-29 RX ORDER — LISINOPRIL 2.5 MG/1
20 TABLET ORAL DAILY
Refills: 0 | Status: DISCONTINUED | OUTPATIENT
Start: 2019-06-29 | End: 2019-07-02

## 2019-06-29 RX ADMIN — Medication 1 DROP(S): at 23:27

## 2019-06-29 RX ADMIN — TAMSULOSIN HYDROCHLORIDE 0.4 MILLIGRAM(S): 0.4 CAPSULE ORAL at 21:03

## 2019-06-29 RX ADMIN — Medication 1 DROP(S): at 11:29

## 2019-06-29 RX ADMIN — Medication 1 DROP(S): at 06:17

## 2019-06-29 RX ADMIN — SIMVASTATIN 40 MILLIGRAM(S): 20 TABLET, FILM COATED ORAL at 21:03

## 2019-06-29 RX ADMIN — PANTOPRAZOLE SODIUM 40 MILLIGRAM(S): 20 TABLET, DELAYED RELEASE ORAL at 06:18

## 2019-06-29 RX ADMIN — PANTOPRAZOLE SODIUM 40 MILLIGRAM(S): 20 TABLET, DELAYED RELEASE ORAL at 18:08

## 2019-06-29 RX ADMIN — Medication 2: at 18:10

## 2019-06-29 RX ADMIN — LISINOPRIL 10 MILLIGRAM(S): 2.5 TABLET ORAL at 06:18

## 2019-06-29 RX ADMIN — Medication 1 DROP(S): at 18:09

## 2019-06-29 RX ADMIN — FINASTERIDE 5 MILLIGRAM(S): 5 TABLET, FILM COATED ORAL at 11:30

## 2019-06-30 LAB
GLUCOSE BLDC GLUCOMTR-MCNC: 135 MG/DL — HIGH (ref 70–99)
GLUCOSE BLDC GLUCOMTR-MCNC: 181 MG/DL — HIGH (ref 70–99)
GLUCOSE BLDC GLUCOMTR-MCNC: 182 MG/DL — HIGH (ref 70–99)
GLUCOSE BLDC GLUCOMTR-MCNC: 191 MG/DL — HIGH (ref 70–99)
GLUCOSE BLDC GLUCOMTR-MCNC: 214 MG/DL — HIGH (ref 70–99)
HCT VFR BLD CALC: 28.3 % — LOW (ref 39–50)
HGB BLD-MCNC: 9.5 G/DL — LOW (ref 13–17)

## 2019-06-30 RX ADMIN — LISINOPRIL 20 MILLIGRAM(S): 2.5 TABLET ORAL at 05:12

## 2019-06-30 RX ADMIN — Medication 1 DROP(S): at 11:15

## 2019-06-30 RX ADMIN — Medication 1 DROP(S): at 23:07

## 2019-06-30 RX ADMIN — PANTOPRAZOLE SODIUM 40 MILLIGRAM(S): 20 TABLET, DELAYED RELEASE ORAL at 17:38

## 2019-06-30 RX ADMIN — TAMSULOSIN HYDROCHLORIDE 0.4 MILLIGRAM(S): 0.4 CAPSULE ORAL at 21:01

## 2019-06-30 RX ADMIN — FINASTERIDE 5 MILLIGRAM(S): 5 TABLET, FILM COATED ORAL at 11:15

## 2019-06-30 RX ADMIN — Medication 1 DROP(S): at 17:46

## 2019-06-30 RX ADMIN — SIMVASTATIN 40 MILLIGRAM(S): 20 TABLET, FILM COATED ORAL at 21:01

## 2019-06-30 RX ADMIN — Medication 2: at 17:37

## 2019-06-30 RX ADMIN — PANTOPRAZOLE SODIUM 40 MILLIGRAM(S): 20 TABLET, DELAYED RELEASE ORAL at 05:11

## 2019-06-30 RX ADMIN — Medication 1 DROP(S): at 05:11

## 2019-06-30 RX ADMIN — Medication 2: at 13:42

## 2019-06-30 NOTE — PROGRESS NOTE ADULT - PROVIDER SPECIALTY LIST ADULT
Gastroenterology
Hospitalist
Gastroenterology
Hospitalist

## 2019-06-30 NOTE — PROGRESS NOTE ADULT - PROBLEM SELECTOR PROBLEM 1
Lower GI bleed

## 2019-06-30 NOTE — PROGRESS NOTE ADULT - PROBLEM SELECTOR PROBLEM 5
Hypernatremia
CAD (coronary artery disease)

## 2019-06-30 NOTE — PROGRESS NOTE ADULT - PROBLEM SELECTOR PROBLEM 7
CAD (coronary artery disease)
MR (mitral regurgitation)

## 2019-06-30 NOTE — PROGRESS NOTE ADULT - SUBJECTIVE AND OBJECTIVE BOX
86/M with PMHx of Advanced dementia, CAD, AFib not on coumadin, CKD3, CHF, DM2, HLD, HTN, MR, TIA, Anemia brought to ER for c/o rectal bleeding.  Patient had a BM at home, with bright red blood that tracked up his back.  Had another bloody movement here in ED.  Has h/o AVM with rectal bleeding in the past, but as per family has been many years.  Last endoscopy/colonoscopy a few years ago.   Pt on asa 325 daily.     Dr. Man ( [facility]) called at 10:28 am to report that there was Active bleeding at splenic Flexure on CTA abd. Prelim report from [facility] stated No Active Bleeding.    6.24: no obvious bleeding  6.25: patient had melena and some clots this am per staff  6.26: no distress, no cp, no sob, tolerated diet  still has some brb per rectum    ROS unable to obtain due to dementia         Vital Signs Last 24 Hrs  T(C): 36.6 (25 Jun 2019 11:25), Max: 36.6 (25 Jun 2019 11:25)  T(F): 97.8 (25 Jun 2019 11:25), Max: 97.8 (25 Jun 2019 11:25)  HR: 52 (25 Jun 2019 11:25) (52 - 69)  BP: 153/69 (25 Jun 2019 11:25) (122/83 - 172/77)  RR: 17 (25 Jun 2019 11:25) (17 - 18)  SpO2: 100% (25 Jun 2019 11:25) (100% - 100%)    PHYSICAL EXAM:    GENERAL: NAD, Well nourished  HEENT:  NC/AT, EOMI, PERRLA, No scleral icterus, Moist mucous membranes, purulent drainage from Rt eye  NECK: Supple, No JVD  CNS:  Alert & Oriented X3, Motor Strength 5/5 B/L upper and lower extremities; DTRs 2+ intact   LUNG: Normal Breath sounds, Clear to auscultation bilaterally, No rales, No rhonchi, No wheezing  HEART: RRR; No murmurs, No rubs  ABDOMEN: +BS, ST/ND/NT  GENITOURINARY: Voiding, Bladder not distended  EXTREMITIES:  2+ Peripheral Pulses, No clubbing, No cyanosis, No tibial edema  MUSCULOSKELTAL: Joints normal ROM, No TTP, No effusion  SKIN: no rashes  RECTAL: deferred, not indicated  BREAST: deferred                          8.0    4.84  )-----------( 120      ( 24 Jun 2019 08:35 )             25.0                           9.3    x     )-----------( x        ( 25 Jun 2019 07:48 )             27.8                           10.0   x     )-----------( x        ( 26 Jun 2019 07:56 )             30.5         MEDICATIONS  (STANDING):    finasteride 5 milliGRAM(s) Oral daily  insulin lispro (HumaLOG) corrective regimen sliding scale   SubCutaneous three times a day before meals  insulin lispro (HumaLOG) corrective regimen sliding scale   SubCutaneous at bedtime  pantoprazole  Injectable 40 milliGRAM(s) IV Push every 12 hours  simvastatin 40 milliGRAM(s) Oral at bedtime  sodium chloride 0.45%. 1000 milliLiter(s) (75 mL/Hr) IV Continuous <Continuous>  tamsulosin 0.4 milliGRAM(s) Oral at bedtime    MEDICATIONS  (PRN):  dextrose 40% Gel 15 Gram(s) Oral once PRN Blood Glucose LESS THAN 70 milliGRAM(s)/deciliter  glucagon  Injectable 1 milliGRAM(s) IntraMuscular once PRN Glucose LESS THAN 70 milligrams/deciliter  ondansetron Injectable 4 milliGRAM(s) IV Push every 6 hours PRN Nausea      all labs reviewed  all imaging reviewed    1. Anemia due to acute GI hemorrhage:  s/p  1u RBC yesterday  Hb stable today 10    2. GI bleeding due to colonic AVM  CT noted  GI evaluation noted: favors conservative management but if continues to bleed will consider colonoscopy as last resort    3. Hypernatremia due to dehydration: resolved  IV 1/2 NS    4. Advanced dementia    5. Afib: stable  off anticoag due to above
86/M with PMHx of Advanced dementia, CAD, AFib not on coumadin, CKD3, CHF, DM2, HLD, HTN, MR, TIA, Anemia brought to ER for c/o rectal bleeding.  Patient had a BM at home, with bright red blood that tracked up his back.  Had another bloody movement here in ED.  Has h/o AVM with rectal bleeding in the past, but as per family has been many years.  Last endoscopy/colonoscopy a few years ago.   Pt on asa 325 daily.     Dr. Mna ( [facility]) called at 10:28 am to report that there was Active bleeding at splenic Flexure on CTA abd. Prelim report from [facility] stated No Active Bleeding.    6.24: no obvious bleeding    ROS unable to obtain due to dementia         Vital Signs Last 24 Hrs  T(C): 36.6 (24 Jun 2019 10:31), Max: 37.1 (24 Jun 2019 04:47)  T(F): 97.8 (24 Jun 2019 10:31), Max: 98.7 (24 Jun 2019 04:47)  HR: 64 (24 Jun 2019 10:31) (62 - 69)  BP: 146/73 (24 Jun 2019 10:31) (137/90 - 160/69)  RR: 18 (24 Jun 2019 10:31) (18 - 20)  SpO2: 95% (24 Jun 2019 10:31) (95% - 100%)    PHYSICAL EXAM:    GENERAL: NAD, Well nourished  HEENT:  NC/AT, EOMI, PERRLA, No scleral icterus, Moist mucous membranes, purulent drainage from Rt eye  NECK: Supple, No JVD  CNS:  Alert & Oriented X3, Motor Strength 5/5 B/L upper and lower extremities; DTRs 2+ intact   LUNG: Normal Breath sounds, Clear to auscultation bilaterally, No rales, No rhonchi, No wheezing  HEART: RRR; No murmurs, No rubs  ABDOMEN: +BS, ST/ND/NT  GENITOURINARY: Voiding, Bladder not distended  EXTREMITIES:  2+ Peripheral Pulses, No clubbing, No cyanosis, No tibial edema  MUSCULOSKELTAL: Joints normal ROM, No TTP, No effusion  SKIN: no rashes  RECTAL: deferred, not indicated  BREAST: deferred                          8.0    4.84  )-----------( 120      ( 24 Jun 2019 08:35 )             25.0     06-24    146<H>  |  115<H>  |  28<H>  ----------------------------<  94  3.9   |  25  |  1.10    Ca    8.7      24 Jun 2019 08:35      Vancomycin levels:   Cultures:     MEDICATIONS  (STANDING):    finasteride 5 milliGRAM(s) Oral daily  insulin lispro (HumaLOG) corrective regimen sliding scale   SubCutaneous three times a day before meals  insulin lispro (HumaLOG) corrective regimen sliding scale   SubCutaneous at bedtime  pantoprazole  Injectable 40 milliGRAM(s) IV Push every 12 hours  simvastatin 40 milliGRAM(s) Oral at bedtime  sodium chloride 0.45%. 1000 milliLiter(s) (75 mL/Hr) IV Continuous <Continuous>  tamsulosin 0.4 milliGRAM(s) Oral at bedtime    MEDICATIONS  (PRN):  dextrose 40% Gel 15 Gram(s) Oral once PRN Blood Glucose LESS THAN 70 milliGRAM(s)/deciliter  glucagon  Injectable 1 milliGRAM(s) IntraMuscular once PRN Glucose LESS THAN 70 milligrams/deciliter  ondansetron Injectable 4 milliGRAM(s) IV Push every 6 hours PRN Nausea      all labs reviewed  all imaging reviewed    1. Anemia due to acute GI hemorrhage:  transfuse 1u RBC    2. GI bleeding due to colonic AVM  CT noted  GI evaluation noted: favors conservative management    3. Hypernatremia due to dehydration:  IV 1/2 NS    4. Advnaced dementia    5. Afib: stable  off anticoag due to above
Patient is a 86y old  Male who presents with a chief complaint of rectal bleed (28 Jun 2019 15:16)      Subective:  Family apparently decided last night that they want to proceed with colonoscopy with Dr. Su.  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 1000 milliLiter(s) (50 mL/Hr) IV Continuous <Continuous>  dextrose 50% Injectable 12.5 Gram(s) IV Push once  dextrose 50% Injectable 25 Gram(s) IV Push once  dextrose 50% Injectable 25 Gram(s) IV Push once  finasteride 5 milliGRAM(s) Oral daily  insulin lispro (HumaLOG) corrective regimen sliding scale   SubCutaneous three times a day before meals  insulin lispro (HumaLOG) corrective regimen sliding scale   SubCutaneous at bedtime  lisinopril 10 milliGRAM(s) Oral daily  pantoprazole    Tablet 40 milliGRAM(s) Oral two times a day  simvastatin 40 milliGRAM(s) Oral at bedtime  tamsulosin 0.4 milliGRAM(s) Oral at bedtime    MEDICATIONS  (PRN):  dextrose 40% Gel 15 Gram(s) Oral once PRN Blood Glucose LESS THAN 70 milliGRAM(s)/deciliter  glucagon  Injectable 1 milliGRAM(s) IntraMuscular once PRN Glucose LESS THAN 70 milligrams/deciliter  ondansetron Injectable 4 milliGRAM(s) IV Push every 6 hours PRN Nausea  QUEtiapine 25 milliGRAM(s) Oral two times a day PRN agitation      REVIEW OF SYSTEMS:    RESPIRATORY: No shortness of breath  CARDIOVASCULAR: No chest pain  All other review of systems is negative unless indicated above.    Vital Signs Last 24 Hrs  T(C): 36.4 (28 Jun 2019 05:05), Max: 36.6 (27 Jun 2019 20:54)  T(F): 97.5 (28 Jun 2019 05:05), Max: 97.8 (27 Jun 2019 20:54)  HR: 50 (28 Jun 2019 05:05) (50 - 96)  BP: 154/67 (28 Jun 2019 05:05) (134/65 - 156/78)  BP(mean): --  RR: 17 (28 Jun 2019 05:05) (17 - 18)  SpO2: 96% (28 Jun 2019 05:05) (96% - 100%)    PHYSICAL EXAM:    Constitutional: NAD, well-developed  Respiratory: CTAB  Cardiovascular: S1 and S2, RRR  Gastrointestinal: BS+, soft, NT/ND  Extremities: No peripheral edema  Psychiatric: non-verbal    LABS:                        9.9    x     )-----------( x        ( 27 Jun 2019 07:37 )             30.2                 RADIOLOGY & ADDITIONAL STUDIES:
Subjective:      MEDICATIONS  (STANDING):  dextrose 5% + sodium chloride 0.9%. 1000 milliLiter(s) (50 mL/Hr) IV Continuous <Continuous>  dextrose 5%. 1000 milliLiter(s) (50 mL/Hr) IV Continuous <Continuous>  dextrose 50% Injectable 12.5 Gram(s) IV Push once  dextrose 50% Injectable 25 Gram(s) IV Push once  dextrose 50% Injectable 25 Gram(s) IV Push once  finasteride 5 milliGRAM(s) Oral daily  insulin lispro (HumaLOG) corrective regimen sliding scale   SubCutaneous three times a day before meals  insulin lispro (HumaLOG) corrective regimen sliding scale   SubCutaneous at bedtime  pantoprazole  Injectable 40 milliGRAM(s) IV Push every 12 hours  simvastatin 40 milliGRAM(s) Oral at bedtime  tamsulosin 0.4 milliGRAM(s) Oral at bedtime    MEDICATIONS  (PRN):  dextrose 40% Gel 15 Gram(s) Oral once PRN Blood Glucose LESS THAN 70 milliGRAM(s)/deciliter  glucagon  Injectable 1 milliGRAM(s) IntraMuscular once PRN Glucose LESS THAN 70 milligrams/deciliter  ondansetron Injectable 4 milliGRAM(s) IV Push every 6 hours PRN Nausea      Allergies    No Known Allergies    Intolerances        REVIEW OF SYSTEMS: unabl      Vital Signs Last 24 Hrs  T(C): 36.1 (23 Jun 2019 10:51), Max: 36.5 (22 Jun 2019 19:45)  T(F): 97 (23 Jun 2019 10:51), Max: 97.7 (22 Jun 2019 19:45)  HR: 62 (23 Jun 2019 10:51) (62 - 70)  BP: 156/63 (23 Jun 2019 10:51) (150/72 - 156/63)  BP(mean): --  RR: 18 (23 Jun 2019 10:51) (17 - 18)  SpO2: 100% (23 Jun 2019 10:51) (99% - 100%)    PHYSICAL EXAMINATION:  SKIN: no rashes  HEAD: NC/AT  EYES: PERRLA, EOMI  EARS: TM's intact  NOSE: no abnormalities  NECK:  Supple. No lymphadenopathy. Jugular venous pressure not elevated. Carotids equal.   HEART:   The cardiac impulse has a normal quality. Reg., Nl S1 and S2.  There are no murmurs, rubs or gallops noted  CHEST:  Chest is clear to auscultation. Normal respiratory effort.  ABDOMEN:  Soft and nontender.   EXTREMITIES:  no C/C/E  NEURO: AAO x 3, no focal deficts       LABS:                        8.5    5.05  )-----------( 130      ( 23 Jun 2019 07:41 )             26.4     06-23    149<H>  |  119<H>  |  37<H>  ----------------------------<  157<H>  4.2   |  24  |  1.18    Ca    9.3      23 Jun 2019 07:41            RADIOLOGY & ADDITIONAL TESTS:
86/M with PMHx of Advanced dementia, CAD, AFib not on coumadin, CKD3, CHF, DM2, HLD, HTN, MR, TIA, Anemia brought to ER for c/o rectal bleeding.  Patient had a BM at home, with bright red blood that tracked up his back.  Had another bloody movement here in ED.  Has h/o AVM with rectal bleeding in the past, but as per family has been many years.  Last endoscopy/colonoscopy a few years ago.   Pt on asa 325 daily.     Dr. Man ( [facility]) called at 10:28 am to report that there was Active bleeding at splenic Flexure on CTA abd. Prelim report from [facility] stated No Active Bleeding.    6.24: no obvious bleeding  6.25: patient had melena and some clots this am per staff    ROS unable to obtain due to dementia         Vital Signs Last 24 Hrs  T(C): 36.6 (25 Jun 2019 11:25), Max: 36.6 (25 Jun 2019 11:25)  T(F): 97.8 (25 Jun 2019 11:25), Max: 97.8 (25 Jun 2019 11:25)  HR: 52 (25 Jun 2019 11:25) (52 - 69)  BP: 153/69 (25 Jun 2019 11:25) (122/83 - 172/77)  RR: 17 (25 Jun 2019 11:25) (17 - 18)  SpO2: 100% (25 Jun 2019 11:25) (100% - 100%)    PHYSICAL EXAM:    GENERAL: NAD, Well nourished  HEENT:  NC/AT, EOMI, PERRLA, No scleral icterus, Moist mucous membranes, purulent drainage from Rt eye  NECK: Supple, No JVD  CNS:  Alert & Oriented X3, Motor Strength 5/5 B/L upper and lower extremities; DTRs 2+ intact   LUNG: Normal Breath sounds, Clear to auscultation bilaterally, No rales, No rhonchi, No wheezing  HEART: RRR; No murmurs, No rubs  ABDOMEN: +BS, ST/ND/NT  GENITOURINARY: Voiding, Bladder not distended  EXTREMITIES:  2+ Peripheral Pulses, No clubbing, No cyanosis, No tibial edema  MUSCULOSKELTAL: Joints normal ROM, No TTP, No effusion  SKIN: no rashes  RECTAL: deferred, not indicated  BREAST: deferred                          8.0    4.84  )-----------( 120      ( 24 Jun 2019 08:35 )             25.0     Labs:                        9.3    x     )-----------( x        ( 25 Jun 2019 07:48 )             27.8     06-25    140  |  110<H>  |  24<H>  ----------------------------<  90  4.1   |  25  |  1.05    Ca    8.6      25 Jun 2019 07:48             Cultures:       Vancomycin levels:   Cultures:     MEDICATIONS  (STANDING):    finasteride 5 milliGRAM(s) Oral daily  insulin lispro (HumaLOG) corrective regimen sliding scale   SubCutaneous three times a day before meals  insulin lispro (HumaLOG) corrective regimen sliding scale   SubCutaneous at bedtime  pantoprazole  Injectable 40 milliGRAM(s) IV Push every 12 hours  simvastatin 40 milliGRAM(s) Oral at bedtime  sodium chloride 0.45%. 1000 milliLiter(s) (75 mL/Hr) IV Continuous <Continuous>  tamsulosin 0.4 milliGRAM(s) Oral at bedtime    MEDICATIONS  (PRN):  dextrose 40% Gel 15 Gram(s) Oral once PRN Blood Glucose LESS THAN 70 milliGRAM(s)/deciliter  glucagon  Injectable 1 milliGRAM(s) IntraMuscular once PRN Glucose LESS THAN 70 milligrams/deciliter  ondansetron Injectable 4 milliGRAM(s) IV Push every 6 hours PRN Nausea      all labs reviewed  all imaging reviewed    1. Anemia due to acute GI hemorrhage:  s/p  1u RBC yesterday    2. GI bleeding due to colonic AVM  CT noted  GI evaluation noted: favors conservative management but if continues to bleed will consider colonoscopy    3. Hypernatremia due to dehydration: resolved  IV 1/2 NS    4. Advanced dementia    5. Afib: stable  off anticoag due to above
86/M with PMHx of Advanced dementia, CAD, AFib not on coumadin, CKD3, CHF, DM2, HLD, HTN, MR, TIA, Anemia brought to ER for c/o rectal bleeding.  Patient had a BM at home, with bright red blood that tracked up his back.  Had another bloody movement here in ED.  Has h/o AVM with rectal bleeding in the past, but as per family has been many years.  Last endoscopy/colonoscopy a few years ago.   Pt on asa 325 daily.     Dr. Man ( [facility]) called at 10:28 am to report that there was Active bleeding at splenic Flexure on CTA abd. Prelim report from [facility] stated No Active Bleeding.    6.24: no obvious bleeding  6.25: patient had melena and some clots this am per staff  6.26: no distress, no cp, no sob, tolerated diet  still has some brb per rectum  6.27: patient still passing tarry stool with clots; Hb stable  agitated  6.28: no distress, not agitated, confused    ROS unable to obtain due to dementia         Vital Signs Last 24 Hrs  T(C): 36.4 (28 Jun 2019 05:05), Max: 36.6 (27 Jun 2019 20:54)  T(F): 97.5 (28 Jun 2019 05:05), Max: 97.8 (27 Jun 2019 20:54)  HR: 50 (28 Jun 2019 05:05) (50 - 96)  BP: 154/67 (28 Jun 2019 05:05) (134/65 - 156/78)  RR: 17 (28 Jun 2019 05:05) (17 - 18)  SpO2: 96% (28 Jun 2019 05:05) (96% - 100%)  PHYSICAL EXAM:    GENERAL: NAD, Well nourished  HEENT:  NC/AT, EOMI, PERRLA, No scleral icterus, Moist mucous membranes, purulent drainage from Rt eye  NECK: Supple, No JVD  CNS:  Alert & Oriented X3, Motor Strength 5/5 B/L upper and lower extremities; DTRs 2+ intact   LUNG: Normal Breath sounds, Clear to auscultation bilaterally, No rales, No rhonchi, No wheezing  HEART: RRR; No murmurs, No rubs  ABDOMEN: +BS, ST/ND/NT  GENITOURINARY: Voiding, Bladder not distended  EXTREMITIES:  2+ Peripheral Pulses, No clubbing, No cyanosis, No tibial edema  MUSCULOSKELTAL: Joints normal ROM, No TTP, No effusion  SKIN: no rashes  RECTAL: deferred, not indicated  BREAST: deferred                          8.0    4.84  )-----------( 120      ( 24 Jun 2019 08:35 )             25.0                           9.3    x     )-----------( x        ( 25 Jun 2019 07:48 )             27.8                           10.0   x     )-----------( x        ( 26 Jun 2019 07:56 )             30.5       MEDICATIONS  (STANDING):  ciprofloxacin  0.3% Ophthalmic Solution 1 Drop(s) Right EYE four times a day  finasteride 5 milliGRAM(s) Oral daily  insulin lispro (HumaLOG) corrective regimen sliding scale   SubCutaneous three times a day before meals  insulin lispro (HumaLOG) corrective regimen sliding scale   SubCutaneous at bedtime  lisinopril 10 milliGRAM(s) Oral daily  pantoprazole    Tablet 40 milliGRAM(s) Oral two times a day  simvastatin 40 milliGRAM(s) Oral at bedtime  tamsulosin 0.4 milliGRAM(s) Oral at bedtime        all labs reviewed  all imaging reviewed    1. Anemia due to acute GI hemorrhage:  s/p  1u RBC yesterday  Hb stable today 9.9    2. GI bleeding due to colonic AVM  CT noted  GI evaluation noted: favors conservative management but if continues to bleed will consider colonoscopy as last resort  Plan for colonoscopy on monday due to persistent hematochezia     3. Hypernatremia due to dehydration: resolved    4. Advanced dementia:  start Seroquel for agitation    5. Afib: stable  off anticoag due to above     6. Htn not controlled:  increase Lisinopril
86/M with PMHx of Advanced dementia, CAD, AFib not on coumadin, CKD3, CHF, DM2, HLD, HTN, MR, TIA, Anemia brought to ER for c/o rectal bleeding.  Patient had a BM at home, with bright red blood that tracked up his back.  Had another bloody movement here in ED.  Has h/o AVM with rectal bleeding in the past, but as per family has been many years.  Last endoscopy/colonoscopy a few years ago.   Pt on asa 325 daily.     Dr. Man ( [facility]) called at 10:28 am to report that there was Active bleeding at splenic Flexure on CTA abd. Prelim report from [facility] stated No Active Bleeding.    6.24: no obvious bleeding  6.25: patient had melena and some clots this am per staff  6.26: no distress, no cp, no sob, tolerated diet  still has some brb per rectum  6.27: patient still passing tarry stool with clots; Hb stable  agitated  6.28: no distress, not agitated, confused  6.29: not agitated, somnolent, no n/v/d, no hematochezia overnight, one episode of tarry stool  6.30: no distress, no bleeding, mild LUE edema     ROS unable to obtain due to dementia           Vital Signs Last 24 Hrs  T(C): 36.3 (30 Jun 2019 11:24), Max: 36.5 (29 Jun 2019 16:34)  T(F): 97.4 (30 Jun 2019 11:24), Max: 97.7 (29 Jun 2019 16:34)  HR: 50 (30 Jun 2019 11:24) (50 - 58)  BP: 130/56 (30 Jun 2019 11:24) (105/78 - 153/63)  BP(mean): --  RR: 16 (30 Jun 2019 11:24) (16 - 18)  SpO2: 100% (30 Jun 2019 11:24) (96% - 100%)    PHYSICAL EXAM:    GENERAL: NAD, Well nourished  HEENT:  NC/AT, EOMI, PERRLA, No scleral icterus, Moist mucous membranes, purulent drainage from Rt eye  NECK: Supple, No JVD  CNS:  Alert & Oriented X1, Motor Strength 5/5 B/L upper and lower extremities; DTRs 2+ intact   LUNG: Normal Breath sounds, Clear to auscultation bilaterally, No rales, No rhonchi, No wheezing  HEART: RRR; No murmurs, No rubs  ABDOMEN: +BS, ST/ND/NT  GENITOURINARY: Voiding, Bladder not distended  EXTREMITIES:  2+ Peripheral Pulses, No clubbing, No cyanosis, No tibial edema  MUSCULOSKELTAL: Joints normal ROM, No TTP, No effusion  SKIN: no rashes  RECTAL: deferred, not indicated  BREAST: deferred                          Labs:                        9.5    x     )-----------( x        ( 30 Jun 2019 07:07 )             28.3     06-29    143  |  110<H>  |  34<H>  ----------------------------<  165<H>  4.4   |  26  |  1.20    Ca    9.3      29 Jun 2019 16:39             Cultures:       MEDICATIONS  (STANDING):  ciprofloxacin  0.3% Ophthalmic Solution 1 Drop(s) Right EYE four times a day  finasteride 5 milliGRAM(s) Oral daily  insulin lispro (HumaLOG) corrective regimen sliding scale   SubCutaneous three times a day before meals  insulin lispro (HumaLOG) corrective regimen sliding scale   SubCutaneous at bedtime  lisinopril 10 milliGRAM(s) Oral daily  pantoprazole    Tablet 40 milliGRAM(s) Oral two times a day  simvastatin 40 milliGRAM(s) Oral at bedtime  tamsulosin 0.4 milliGRAM(s) Oral at bedtime        all labs reviewed  all imaging reviewed    1. Anemia due to acute GI hemorrhage:  s/p  1u RBC   Hb stable today 9.5    2. GI bleeding due to colonic AVM  CT noted  GI evaluation noted:  was planned for colonoscopy monday but now cancelled due to abscence of symptoms       3. Hypernatremia due to dehydration: resolved    4. Advanced dementia:  start Seroquel for agitation    5. Afib: stable  off anticoag due to above     6. Htn  controlled:  increased Lisinopril     7. RUE dependent edema:  will check sonogram to r/o dvt    dc planning tomorrow
GI    pt nonverbal  no reported events  no bleeding per RN, no BM overnight  CBC from today pending    ROS  pt nonverbal, cannot obtain    Vital Signs Last 24 Hrs  T(C): 36.5 (22 Jun 2019 11:23), Max: 36.5 (22 Jun 2019 11:23)  T(F): 97.7 (22 Jun 2019 11:23), Max: 97.7 (22 Jun 2019 11:23)  HR: 74 (22 Jun 2019 11:23) (73 - 74)  BP: 147/81 (22 Jun 2019 11:23) (126/54 - 147/81)  BP(mean): --  RR: 18 (22 Jun 2019 11:23) (17 - 18)  SpO2: 99% (22 Jun 2019 11:23) (98% - 99%)    Constitutional: NA  Respiratory: CTAB  Cardiovascular: S1 and S2, RRR  Gastrointestinal: BS+, soft, NT/ND  Extremities: No peripheral edema  Psychiatric: nonverbal, does not open eyes  Skin: No rashes                          10.1   7.18  )-----------( 165      ( 21 Jun 2019 08:09 )             30.5     06-21    143  |  112<H>  |  66<H>  ----------------------------<  200<H>  5.1   |  24  |  1.46<H>    Ca    8.8      21 Jun 2019 08:09    TPro  6.3  /  Alb  3.0<L>  /  TBili  0.6  /  DBili  x   /  AST  30  /  ALT  33  /  AlkPhos  77  06-20
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 1000 milliLiter(s) (50 mL/Hr) IV Continuous <Continuous>  dextrose 5%. 1000 milliLiter(s) (50 mL/Hr) IV Continuous <Continuous>  dextrose 50% Injectable 12.5 Gram(s) IV Push once  dextrose 50% Injectable 25 Gram(s) IV Push once  dextrose 50% Injectable 25 Gram(s) IV Push once  finasteride 5 milliGRAM(s) Oral daily  insulin lispro (HumaLOG) corrective regimen sliding scale   SubCutaneous three times a day before meals  insulin lispro (HumaLOG) corrective regimen sliding scale   SubCutaneous at bedtime  pantoprazole  Injectable 40 milliGRAM(s) IV Push every 12 hours  simvastatin 40 milliGRAM(s) Oral at bedtime  tamsulosin 0.4 milliGRAM(s) Oral at bedtime    MEDICATIONS  (PRN):  dextrose 40% Gel 15 Gram(s) Oral once PRN Blood Glucose LESS THAN 70 milliGRAM(s)/deciliter  glucagon  Injectable 1 milliGRAM(s) IntraMuscular once PRN Glucose LESS THAN 70 milligrams/deciliter  ondansetron Injectable 4 milliGRAM(s) IV Push every 6 hours PRN Nausea    Allergies  No known Allergies    REVIEW OF SYSTEMS:  not obtained due to mental status    Vital Signs Last 24 Hrs  T(C): 36.3 (23 Jun 2019 05:05), Max: 36.5 (22 Jun 2019 11:23)  T(F): 97.3 (23 Jun 2019 05:05), Max: 97.7 (22 Jun 2019 11:23)  HR: 66 (23 Jun 2019 05:05) (66 - 74)  BP: 154/78 (23 Jun 2019 05:05) (147/81 - 154/78)  BP(mean): --  RR: 17 (22 Jun 2019 19:45) (17 - 18)  SpO2: 100% (23 Jun 2019 05:05) (99% - 100%)    PHYSICAL EXAM:    Constitutional: NAD  Respiratory: CTA  Cardiovascular: S1 and S2  Gastrointestinal: BS+, soft      LABS:                        8.5    5.05  )-----------( 130      ( 23 Jun 2019 07:41 )             26.4     06-23    149<H>  |  119<H>  |  37<H>  ----------------------------<  157<H>  4.2   |  24  |  1.18    Ca    9.3      23 Jun 2019 07:41            RADIOLOGY & ADDITIONAL STUDIES:
Patient is a 86y old  Male who presents with a chief complaint of rectal bleed (24 Jun 2019 16:13)      Subective:  Seen earlier, was resting  No bleeding per nursing staff.    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  Transfusion history  Anemia with chronic illness  SILVERIO (obstructive sleep apnea): on CPAP  TIA (transient ischemic attack)  CKD (chronic kidney disease)  MR (mitral regurgitation)  Memory loss, short term  Hiatal hernia  DM (diabetes mellitus screen)  HTN (hypertension)  HLD (hyperlipidemia)  BPH (benign prostatic hyperplasia)  Chronic atrial fibrillation  History of tonsillectomy  H/O heart surgery: Clip  H/O cystoscopy: TURP  History of hernia surgery  S/P CABG x 4      MEDICATIONS  (STANDING):  ciprofloxacin  0.3% Ophthalmic Solution 1 Drop(s) Right EYE four times a day  dextrose 5% + sodium chloride 0.9%. 1000 milliLiter(s) (50 mL/Hr) IV Continuous <Continuous>  dextrose 5%. 1000 milliLiter(s) (50 mL/Hr) IV Continuous <Continuous>  dextrose 50% Injectable 12.5 Gram(s) IV Push once  dextrose 50% Injectable 25 Gram(s) IV Push once  dextrose 50% Injectable 25 Gram(s) IV Push once  finasteride 5 milliGRAM(s) Oral daily  insulin lispro (HumaLOG) corrective regimen sliding scale   SubCutaneous three times a day before meals  insulin lispro (HumaLOG) corrective regimen sliding scale   SubCutaneous at bedtime  pantoprazole  Injectable 40 milliGRAM(s) IV Push every 12 hours  simvastatin 40 milliGRAM(s) Oral at bedtime  sodium chloride 0.45%. 1000 milliLiter(s) (75 mL/Hr) IV Continuous <Continuous>  tamsulosin 0.4 milliGRAM(s) Oral at bedtime    MEDICATIONS  (PRN):  dextrose 40% Gel 15 Gram(s) Oral once PRN Blood Glucose LESS THAN 70 milliGRAM(s)/deciliter  glucagon  Injectable 1 milliGRAM(s) IntraMuscular once PRN Glucose LESS THAN 70 milligrams/deciliter  ondansetron Injectable 4 milliGRAM(s) IV Push every 6 hours PRN Nausea      REVIEW OF SYSTEMS:    RESPIRATORY: No shortness of breath  CARDIOVASCULAR: No chest pain  All other review of systems is negative unless indicated above.    Vital Signs Last 24 Hrs  T(C): 36.4 (24 Jun 2019 17:14), Max: 37.1 (24 Jun 2019 04:47)  T(F): 97.6 (24 Jun 2019 17:14), Max: 98.7 (24 Jun 2019 04:47)  HR: 58 (24 Jun 2019 17:14) (58 - 69)  BP: 157/75 (24 Jun 2019 17:14) (137/90 - 160/69)  BP(mean): --  RR: 18 (24 Jun 2019 17:14) (18 - 20)  SpO2: 100% (24 Jun 2019 17:14) (95% - 100%)    PHYSICAL EXAM:    Constitutional: NAD,  Respiratory: CTAB  Cardiovascular: S1 and S2,  Gastrointestinal: BS+, soft, NT/ND  Extremities: No peripheral edema    LABS:                        8.0    4.84  )-----------( 120      ( 24 Jun 2019 08:35 )             25.0     06-24    146<H>  |  115<H>  |  28<H>  ----------------------------<  94  3.9   |  25  |  1.10    Ca    8.7      24 Jun 2019 08:35            RADIOLOGY & ADDITIONAL STUDIES:
Patient is a 86y old  Male who presents with a chief complaint of rectal bleed (24 Jun 2019 18:10)      Subective:  Had BM with dark blood overnight -- unclear if was fresh or old blood.    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  Transfusion history  Anemia with chronic illness  SILVERIO (obstructive sleep apnea): on CPAP  TIA (transient ischemic attack)  CKD (chronic kidney disease)  MR (mitral regurgitation)  Memory loss, short term  Hiatal hernia  DM (diabetes mellitus screen)  HTN (hypertension)  HLD (hyperlipidemia)  BPH (benign prostatic hyperplasia)  Chronic atrial fibrillation  History of tonsillectomy  H/O heart surgery: Clip  H/O cystoscopy: TURP  History of hernia surgery  S/P CABG x 4      MEDICATIONS  (STANDING):  ciprofloxacin  0.3% Ophthalmic Solution 1 Drop(s) Right EYE four times a day  dextrose 5% + sodium chloride 0.9%. 1000 milliLiter(s) (50 mL/Hr) IV Continuous <Continuous>  dextrose 5%. 1000 milliLiter(s) (50 mL/Hr) IV Continuous <Continuous>  dextrose 50% Injectable 12.5 Gram(s) IV Push once  dextrose 50% Injectable 25 Gram(s) IV Push once  dextrose 50% Injectable 25 Gram(s) IV Push once  finasteride 5 milliGRAM(s) Oral daily  insulin lispro (HumaLOG) corrective regimen sliding scale   SubCutaneous three times a day before meals  insulin lispro (HumaLOG) corrective regimen sliding scale   SubCutaneous at bedtime  pantoprazole  Injectable 40 milliGRAM(s) IV Push every 12 hours  simvastatin 40 milliGRAM(s) Oral at bedtime  sodium chloride 0.45%. 1000 milliLiter(s) (75 mL/Hr) IV Continuous <Continuous>  tamsulosin 0.4 milliGRAM(s) Oral at bedtime    MEDICATIONS  (PRN):  dextrose 40% Gel 15 Gram(s) Oral once PRN Blood Glucose LESS THAN 70 milliGRAM(s)/deciliter  glucagon  Injectable 1 milliGRAM(s) IntraMuscular once PRN Glucose LESS THAN 70 milligrams/deciliter  ondansetron Injectable 4 milliGRAM(s) IV Push every 6 hours PRN Nausea      REVIEW OF SYSTEMS:    RESPIRATORY: No shortness of breath  CARDIOVASCULAR: No chest pain  All other review of systems is negative unless indicated above.    Vital Signs Last 24 Hrs  T(C): 36.4 (25 Jun 2019 04:34), Max: 36.6 (24 Jun 2019 10:31)  T(F): 97.6 (25 Jun 2019 04:34), Max: 97.8 (24 Jun 2019 10:31)  HR: 57 (25 Jun 2019 04:34) (57 - 69)  BP: 172/77 (25 Jun 2019 04:34) (122/83 - 172/77)  BP(mean): --  RR: 18 (25 Jun 2019 04:34) (17 - 18)  SpO2: 100% (25 Jun 2019 04:34) (95% - 100%)    PHYSICAL EXAM:    Constitutional: NAD, well-developed  Respiratory: CTAB  Cardiovascular: S1 and S2, RRR  Gastrointestinal: BS+, soft, NT/ND  Extremities: No peripheral edema    LABS:                        9.3    x     )-----------( x        ( 25 Jun 2019 07:48 )             27.8     06-25    140  |  110<H>  |  24<H>  ----------------------------<  90  4.1   |  25  |  1.05    Ca    8.6      25 Jun 2019 07:48            RADIOLOGY & ADDITIONAL STUDIES:
Patient is a 86y old  Male who presents with a chief complaint of rectal bleed (26 Jun 2019 14:05)      Subective:  No complaints but still with bleeding.    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 1000 milliLiter(s) (50 mL/Hr) IV Continuous <Continuous>  dextrose 50% Injectable 12.5 Gram(s) IV Push once  dextrose 50% Injectable 25 Gram(s) IV Push once  dextrose 50% Injectable 25 Gram(s) IV Push once  finasteride 5 milliGRAM(s) Oral daily  insulin lispro (HumaLOG) corrective regimen sliding scale   SubCutaneous three times a day before meals  insulin lispro (HumaLOG) corrective regimen sliding scale   SubCutaneous at bedtime  lisinopril 2.5 milliGRAM(s) Oral daily  pantoprazole    Tablet 40 milliGRAM(s) Oral two times a day  simvastatin 40 milliGRAM(s) Oral at bedtime  tamsulosin 0.4 milliGRAM(s) Oral at bedtime    MEDICATIONS  (PRN):  dextrose 40% Gel 15 Gram(s) Oral once PRN Blood Glucose LESS THAN 70 milliGRAM(s)/deciliter  glucagon  Injectable 1 milliGRAM(s) IntraMuscular once PRN Glucose LESS THAN 70 milligrams/deciliter  ondansetron Injectable 4 milliGRAM(s) IV Push every 6 hours PRN Nausea      REVIEW OF SYSTEMS:    RESPIRATORY: No shortness of breath  CARDIOVASCULAR: No chest pain  All other review of systems is negative unless indicated above.    Vital Signs Last 24 Hrs  T(C): 36.7 (26 Jun 2019 16:35), Max: 37 (26 Jun 2019 04:15)  T(F): 98.1 (26 Jun 2019 16:35), Max: 98.6 (26 Jun 2019 04:15)  HR: 78 (26 Jun 2019 16:35) (50 - 95)  BP: 148/66 (26 Jun 2019 16:35) (148/66 - 163/70)  BP(mean): --  RR: 18 (26 Jun 2019 16:35) (16 - 18)  SpO2: 97% (26 Jun 2019 16:35) (95% - 100%)    PHYSICAL EXAM:    Constitutional: NAD, well-developed  Respiratory: CTAB  Cardiovascular: S1 and S2, RRR  Gastrointestinal: BS+, soft, NT/ND  Extremities: No peripheral edema  Psychiatric: Normal mood, normal affect    LABS:                        10.0   x     )-----------( x        ( 26 Jun 2019 07:56 )             30.5     06-25    140  |  110<H>  |  24<H>  ----------------------------<  90  4.1   |  25  |  1.05    Ca    8.6      25 Jun 2019 07:48            RADIOLOGY & ADDITIONAL STUDIES:
Patient is a 86y old  Male who presents with a chief complaint of rectal bleed (26 Jun 2019 18:24)      Subective:  Continues to have blood per rectum.    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 1000 milliLiter(s) (50 mL/Hr) IV Continuous <Continuous>  dextrose 50% Injectable 12.5 Gram(s) IV Push once  dextrose 50% Injectable 25 Gram(s) IV Push once  dextrose 50% Injectable 25 Gram(s) IV Push once  finasteride 5 milliGRAM(s) Oral daily  insulin lispro (HumaLOG) corrective regimen sliding scale   SubCutaneous three times a day before meals  insulin lispro (HumaLOG) corrective regimen sliding scale   SubCutaneous at bedtime  lisinopril 2.5 milliGRAM(s) Oral daily  pantoprazole    Tablet 40 milliGRAM(s) Oral two times a day  simvastatin 40 milliGRAM(s) Oral at bedtime  tamsulosin 0.4 milliGRAM(s) Oral at bedtime    MEDICATIONS  (PRN):  dextrose 40% Gel 15 Gram(s) Oral once PRN Blood Glucose LESS THAN 70 milliGRAM(s)/deciliter  glucagon  Injectable 1 milliGRAM(s) IntraMuscular once PRN Glucose LESS THAN 70 milligrams/deciliter  ondansetron Injectable 4 milliGRAM(s) IV Push every 6 hours PRN Nausea      REVIEW OF SYSTEMS:    RESPIRATORY: No shortness of breath  CARDIOVASCULAR: No chest pain  All other review of systems is negative unless indicated above.    Vital Signs Last 24 Hrs  T(C): 36.9 (27 Jun 2019 05:20), Max: 37 (26 Jun 2019 11:51)  T(F): 98.5 (27 Jun 2019 05:20), Max: 98.6 (26 Jun 2019 11:51)  HR: 60 (27 Jun 2019 05:20) (54 - 95)  BP: 124/89 (27 Jun 2019 05:20) (117/93 - 148/76)  BP(mean): --  RR: 21 (27 Jun 2019 05:20) (16 - 21)  SpO2: 97% (27 Jun 2019 05:20) (95% - 97%)    PHYSICAL EXAM:    Constitutional: NAD, well-developed  Respiratory: CTAB  Cardiovascular: S1 and S2, RRR  Gastrointestinal: BS+, soft, NT/ND  Extremities: No peripheral edema    LABS:                        10.0   x     )-----------( x        ( 26 Jun 2019 07:56 )             30.5                 RADIOLOGY & ADDITIONAL STUDIES:
Patient is a 86y old  Male who presents with a chief complaint of rectal bleed (29 Jun 2019 13:11)      Subective:  No further bleeding.    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 1000 milliLiter(s) (50 mL/Hr) IV Continuous <Continuous>  dextrose 50% Injectable 12.5 Gram(s) IV Push once  dextrose 50% Injectable 25 Gram(s) IV Push once  dextrose 50% Injectable 25 Gram(s) IV Push once  finasteride 5 milliGRAM(s) Oral daily  insulin lispro (HumaLOG) corrective regimen sliding scale   SubCutaneous three times a day before meals  insulin lispro (HumaLOG) corrective regimen sliding scale   SubCutaneous at bedtime  lisinopril 20 milliGRAM(s) Oral daily  pantoprazole    Tablet 40 milliGRAM(s) Oral two times a day  simvastatin 40 milliGRAM(s) Oral at bedtime  tamsulosin 0.4 milliGRAM(s) Oral at bedtime    MEDICATIONS  (PRN):  dextrose 40% Gel 15 Gram(s) Oral once PRN Blood Glucose LESS THAN 70 milliGRAM(s)/deciliter  glucagon  Injectable 1 milliGRAM(s) IntraMuscular once PRN Glucose LESS THAN 70 milligrams/deciliter  ondansetron Injectable 4 milliGRAM(s) IV Push every 6 hours PRN Nausea  QUEtiapine 25 milliGRAM(s) Oral two times a day PRN agitation      REVIEW OF SYSTEMS:    RESPIRATORY: No shortness of breath  CARDIOVASCULAR: No chest pain  All other review of systems is negative unless indicated above.    Vital Signs Last 24 Hrs  T(C): 36.3 (30 Jun 2019 05:34), Max: 36.5 (29 Jun 2019 16:34)  T(F): 97.4 (30 Jun 2019 05:34), Max: 97.7 (29 Jun 2019 16:34)  HR: 55 (30 Jun 2019 05:34) (52 - 58)  BP: 153/63 (30 Jun 2019 05:34) (105/78 - 153/63)  BP(mean): --  RR: 18 (30 Jun 2019 05:34) (18 - 20)  SpO2: 96% (30 Jun 2019 05:34) (96% - 100%)    PHYSICAL EXAM:    Constitutional: NAD, well-developed  Respiratory: CTAB  Cardiovascular: S1 and S2, RRR  Gastrointestinal: BS+, soft, NT/ND  Extremities: No peripheral edema  Psychiatric: Normal mood, normal affect    LABS:                        9.5    x     )-----------( x        ( 30 Jun 2019 07:07 )             28.3     06-29    143  |  110<H>  |  34<H>  ----------------------------<  165<H>  4.4   |  26  |  1.20    Ca    9.3      29 Jun 2019 16:39            RADIOLOGY & ADDITIONAL STUDIES:
Subjective:  no distress    MEDICATIONS  (STANDING):  dextrose 5% + sodium chloride 0.9%. 1000 milliLiter(s) (50 mL/Hr) IV Continuous <Continuous>  dextrose 5%. 1000 milliLiter(s) (50 mL/Hr) IV Continuous <Continuous>  dextrose 50% Injectable 12.5 Gram(s) IV Push once  dextrose 50% Injectable 25 Gram(s) IV Push once  dextrose 50% Injectable 25 Gram(s) IV Push once  finasteride 5 milliGRAM(s) Oral daily  insulin lispro (HumaLOG) corrective regimen sliding scale   SubCutaneous three times a day before meals  insulin lispro (HumaLOG) corrective regimen sliding scale   SubCutaneous at bedtime  pantoprazole  Injectable 40 milliGRAM(s) IV Push every 12 hours  simvastatin 40 milliGRAM(s) Oral at bedtime  tamsulosin 0.4 milliGRAM(s) Oral at bedtime    MEDICATIONS  (PRN):  dextrose 40% Gel 15 Gram(s) Oral once PRN Blood Glucose LESS THAN 70 milliGRAM(s)/deciliter  glucagon  Injectable 1 milliGRAM(s) IntraMuscular once PRN Glucose LESS THAN 70 milligrams/deciliter  ondansetron Injectable 4 milliGRAM(s) IV Push every 6 hours PRN Nausea      Allergies    No Known Allergies    Intolerances        REVIEW OF SYSTEMS:    CONSTITUTIONAL:  As per HPI.  HEENT:  Eyes:  No diplopia or blurred vision. ENT:  No earache, sore throat or runny nose.  CARDIOVASCULAR:  No pressure, squeezing, tightness, heaviness or aching about the chest, neck, axilla or epigastrium.  RESPIRATORY:  No cough, shortness of breath, PND or orthopnea.  GASTROINTESTINAL:  No nausea, vomiting or diarrhea.  GENITOURINARY:  No dysuria, frequency or urgency.  MUSCULOSKELETAL:  no joint pain, deformity, tenderness  EXTREMITIES: no clubbing cyanosis,edema  SKIN:  No change in skin, hair or nails.  NEUROLOGIC:  No paresthesias, fasciculations, seizures or weakness.  PSYCHIATRIC:  No disorder of thought or mood.  ENDOCRINE:  No heat or cold intolerance, polyuria or polydipsia.  HEMATOLOGICAL:  No easy bruising or bleedings:    Vital Signs Last 24 Hrs  T(C): 36.5 (2019 11:23), Max: 36.5 (2019 11:23)  T(F): 97.7 (2019 11:23), Max: 97.7 (2019 11:23)  HR: 74 (2019 11:23) (73 - 74)  BP: 147/81 (2019 11:23) (126/54 - 147/81)  BP(mean): --  RR: 18 (2019 11:23) (17 - 18)  SpO2: 99% (2019 11:23) (98% - 99%)    PHYSICAL EXAMINATION:  SKIN: no rashes  HEAD: NC/AT  EYES: PERRLA, EOMI  EARS: TM's intact  NOSE: no abnormalities  NECK:  Supple. No lymphadenopathy. Jugular venous pressure not elevated. Carotids equal.   HEART:   The cardiac impulse has a normal quality. Reg., Nl S1 and S2.  There are no murmurs, rubs or gallops noted  CHEST:  Chest is clear to auscultation. Normal respiratory effort.  ABDOMEN:  Soft and nontender.   EXTREMITIES:  no C/C/E  NEURO: AAO x 3, no focal deficts       LABS:                        10.1   7.18  )-----------( 165      ( 2019 08:09 )             30.5     06-21    143  |  112<H>  |  66<H>  ----------------------------<  200<H>  5.1   |  24  |  1.46<H>    Ca    8.8      2019 08:09    TPro  6.3  /  Alb  3.0<L>  /  TBili  0.6  /  DBili  x   /  AST  30  /  ALT  33  /  AlkPhos  77  06-20    PT/INR - ( 2019 22:45 )   PT: 12.1 sec;   INR: 1.09 ratio         PTT - ( 2019 22:45 )  PTT:29.9 sec  Urinalysis Basic - ( 2019 00:30 )    Color: Yellow / Appearance: Clear / S.020 / pH: x  Gluc: x / Ketone: Negative  / Bili: Negative / Urobili: Negative mg/dL   Blood: x / Protein: 100 mg/dL / Nitrite: Negative   Leuk Esterase: Negative / RBC: 0-2 /HPF / WBC 0-2   Sq Epi: x / Non Sq Epi: Negative / Bacteria: Few        RADIOLOGY & ADDITIONAL TESTS:
86/M with PMHx of Advanced dementia, CAD, AFib not on coumadin, CKD3, CHF, DM2, HLD, HTN, MR, TIA, Anemia brought to ER for c/o rectal bleeding.  Patient had a BM at home, with bright red blood that tracked up his back.  Had another bloody movement here in ED.  Has h/o AVM with rectal bleeding in the past, but as per family has been many years.  Last endoscopy/colonoscopy a few years ago.   Pt on asa 325 daily.     Dr. Man ( [facility]) called at 10:28 am to report that there was Active bleeding at splenic Flexure on CTA abd. Prelim report from [facility] stated No Active Bleeding.    6.24: no obvious bleeding  6.25: patient had melena and some clots this am per staff  6.26: no distress, no cp, no sob, tolerated diet  still has some brb per rectum  6.27: patient still passing tarry stool with clots; Hb stable  agitated    ROS unable to obtain due to dementia         Vital Signs Last 24 Hrs  T(C): 36.5 (27 Jun 2019 11:50), Max: 36.9 (27 Jun 2019 05:20)  T(F): 97.7 (27 Jun 2019 11:50), Max: 98.5 (27 Jun 2019 05:20)  HR: 97 (27 Jun 2019 11:50) (54 - 97)  BP: 176/74 (27 Jun 2019 11:50) (117/93 - 176/74)  RR: 18 (27 Jun 2019 11:50) (18 - 21)  SpO2: 100% (27 Jun 2019 11:50) (97% - 100%)  PHYSICAL EXAM:    GENERAL: NAD, Well nourished  HEENT:  NC/AT, EOMI, PERRLA, No scleral icterus, Moist mucous membranes, purulent drainage from Rt eye  NECK: Supple, No JVD  CNS:  Alert & Oriented X3, Motor Strength 5/5 B/L upper and lower extremities; DTRs 2+ intact   LUNG: Normal Breath sounds, Clear to auscultation bilaterally, No rales, No rhonchi, No wheezing  HEART: RRR; No murmurs, No rubs  ABDOMEN: +BS, ST/ND/NT  GENITOURINARY: Voiding, Bladder not distended  EXTREMITIES:  2+ Peripheral Pulses, No clubbing, No cyanosis, No tibial edema  MUSCULOSKELTAL: Joints normal ROM, No TTP, No effusion  SKIN: no rashes  RECTAL: deferred, not indicated  BREAST: deferred                          8.0    4.84  )-----------( 120      ( 24 Jun 2019 08:35 )             25.0                           9.3    x     )-----------( x        ( 25 Jun 2019 07:48 )             27.8                           10.0   x     )-----------( x        ( 26 Jun 2019 07:56 )             30.5       MEDICATIONS  (STANDING):  ciprofloxacin  0.3% Ophthalmic Solution 1 Drop(s) Right EYE four times a day  finasteride 5 milliGRAM(s) Oral daily  insulin lispro (HumaLOG) corrective regimen sliding scale   SubCutaneous three times a day before meals  insulin lispro (HumaLOG) corrective regimen sliding scale   SubCutaneous at bedtime  lisinopril 2.5 milliGRAM(s) Oral daily  pantoprazole    Tablet 40 milliGRAM(s) Oral two times a day  simvastatin 40 milliGRAM(s) Oral at bedtime  tamsulosin 0.4 milliGRAM(s) Oral at bedtime        all labs reviewed  all imaging reviewed    1. Anemia due to acute GI hemorrhage:  s/p  1u RBC yesterday  Hb stable today 9.9    2. GI bleeding due to colonic AVM  CT noted  GI evaluation noted: favors conservative management but if continues to bleed will consider colonoscopy as last resort    3. Hypernatremia due to dehydration: resolved    4. Advanced dementia:  start Seroquel for agitation    5. Afib: stable  off anticoag due to above     6. Htn not controlled:  increase Lisinopril
86/M with PMHx of Advanced dementia, CAD, AFib not on coumadin, CKD3, CHF, DM2, HLD, HTN, MR, TIA, Anemia brought to ER for c/o rectal bleeding.  Patient had a BM at home, with bright red blood that tracked up his back.  Had another bloody movement here in ED.  Has h/o AVM with rectal bleeding in the past, but as per family has been many years.  Last endoscopy/colonoscopy a few years ago.   Pt on asa 325 daily.     Dr. Man ( [facility]) called at 10:28 am to report that there was Active bleeding at splenic Flexure on CTA abd. Prelim report from [facility] stated No Active Bleeding.    6.24: no obvious bleeding  6.25: patient had melena and some clots this am per staff  6.26: no distress, no cp, no sob, tolerated diet  still has some brb per rectum  6.27: patient still passing tarry stool with clots; Hb stable  agitated  6.28: no distress, not agitated, confused  6.29: not agitated, somnolent, no n/v/d, no hematochezia overnight, one episode of tarry stool    ROS unable to obtain due to dementia         Vital Signs Last 24 Hrs  T(C): 36.4 (28 Jun 2019 05:05), Max: 36.6 (27 Jun 2019 20:54)  T(F): 97.5 (28 Jun 2019 05:05), Max: 97.8 (27 Jun 2019 20:54)  HR: 50 (28 Jun 2019 05:05) (50 - 96)  BP: 154/67 (28 Jun 2019 05:05) (134/65 - 156/78)  RR: 17 (28 Jun 2019 05:05) (17 - 18)  SpO2: 96% (28 Jun 2019 05:05) (96% - 100%)  PHYSICAL EXAM:    GENERAL: NAD, Well nourished  HEENT:  NC/AT, EOMI, PERRLA, No scleral icterus, Moist mucous membranes, purulent drainage from Rt eye  NECK: Supple, No JVD  CNS:  Alert & Oriented X3, Motor Strength 5/5 B/L upper and lower extremities; DTRs 2+ intact   LUNG: Normal Breath sounds, Clear to auscultation bilaterally, No rales, No rhonchi, No wheezing  HEART: RRR; No murmurs, No rubs  ABDOMEN: +BS, ST/ND/NT  GENITOURINARY: Voiding, Bladder not distended  EXTREMITIES:  2+ Peripheral Pulses, No clubbing, No cyanosis, No tibial edema  MUSCULOSKELTAL: Joints normal ROM, No TTP, No effusion  SKIN: no rashes  RECTAL: deferred, not indicated  BREAST: deferred                                     9.3    x     )-----------( x        ( 25 Jun 2019 07:48 )             27.8                           10.0   x     )-----------( x        ( 26 Jun 2019 07:56 )             30.5                           9.8    5.33  )-----------( 133      ( 29 Jun 2019 08:26 )             29.8       MEDICATIONS  (STANDING):  ciprofloxacin  0.3% Ophthalmic Solution 1 Drop(s) Right EYE four times a day  finasteride 5 milliGRAM(s) Oral daily  insulin lispro (HumaLOG) corrective regimen sliding scale   SubCutaneous three times a day before meals  insulin lispro (HumaLOG) corrective regimen sliding scale   SubCutaneous at bedtime  lisinopril 10 milliGRAM(s) Oral daily  pantoprazole    Tablet 40 milliGRAM(s) Oral two times a day  simvastatin 40 milliGRAM(s) Oral at bedtime  tamsulosin 0.4 milliGRAM(s) Oral at bedtime        all labs reviewed  all imaging reviewed    1. Anemia due to acute GI hemorrhage:  s/p  1u RBC   Hb stable today 9.8    2. 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 Hypernatremia due to dehydration: resolved    4. Advanced dementia:  start Seroquel for agitation    5. Afib: stable  off anticoag due to above     6. Htn not controlled:  increase Lisinopril

## 2019-06-30 NOTE — PROGRESS NOTE ADULT - PROBLEM SELECTOR PROBLEM 8
MR (mitral regurgitation)
DM (diabetes mellitus screen)

## 2019-06-30 NOTE — PROGRESS NOTE ADULT - PROBLEM SELECTOR PROBLEM 3
SILVERIO (obstructive sleep apnea)

## 2019-06-30 NOTE — PROGRESS NOTE ADULT - PROBLEM SELECTOR PROBLEM 6
DM (diabetes mellitus screen)
Congestive heart failure

## 2019-06-30 NOTE — PROGRESS NOTE ADULT - REASON FOR ADMISSION
rectal bleed

## 2019-06-30 NOTE — PROGRESS NOTE ADULT - ASSESSMENT
- CTA shows colonic bleed at splenic flexure  - s/p transfusion 2 units prbc  - hg drifting down. Will repeat in am  - Na+ elevated. Will hydrate w 0.45NS instead of D5W in view of diabetes  - on iv protonix   - advance diet  - GI f/u noted. Dr. Obando to resume care  - labs in am  - dvt proph
Imp  Ongoing GI bleeding    Plan  Colonoscopy monday afternoon  Risks and benefits reviewed at length with family
- CTA shows colonic bleed at splenic flexure  - hg improved s/p transfusion 2 units prbc  - on iv protonix   - advance diet  - for GI followup  - labs in am  - dvt proph
87yo male with lower gi bleeding,  cta positive at splenic flexure  bleeding has had stuttering course  h/h slightly decreased today but no significant active ongoing bleeding  would very much  favor conservative course - colonoscopy would require NG tube and would be of unclear yield  Dr Obando to resume care in AM
Imp:  GI bleed from the splenic flexure -- possibly diverticular. Seems to have stopped.    Rec:  Supportive care for now  Family prefers non-invasive approach but would agree to more intervention if needed  Clear liquids  Monitor CBC today
Imp:  Ongoing GI bleeding, presumably from splenic flexure based on CT    Rec:  At this point, bleeding is apparently not stopping and I favor a colonoscopy  D/W wife who still thinks "we should wait a few more days"
Imp:  Ongoing bleeding but H/H stable    Rec:  Family would agree to colonoscopy if bleeding doesn't stop but prep would be very difficult (likely would require NG prep etc)  Will reassess in AM, consider d/c if H/H stable etc.
Imp:  Rectal bleeding, finally resolved    Rec:  Reviewed with wife -- will cancel plans for colonoscopy  D/C plan
Imp:  Stuttering lower GI bleed  H/H improved with transfusion    Rec:  Advance diet  If we still see dropping H/H by tomorrow, will have to re-address idea of colonoscopy with family
Imp:  Stuttering lower GI bleed, but seemingly stopped now    Rec:  Continuing supportive care  Trend H/H

## 2019-06-30 NOTE — PROGRESS NOTE ADULT - PROBLEM SELECTOR PROBLEM 2
Anemia due to blood loss

## 2019-07-01 ENCOUNTER — TRANSCRIPTION ENCOUNTER (OUTPATIENT)
Age: 84
End: 2019-07-01

## 2019-07-01 LAB
GLUCOSE BLDC GLUCOMTR-MCNC: 117 MG/DL — HIGH (ref 70–99)
GLUCOSE BLDC GLUCOMTR-MCNC: 137 MG/DL — HIGH (ref 70–99)
GLUCOSE BLDC GLUCOMTR-MCNC: 142 MG/DL — HIGH (ref 70–99)
GLUCOSE BLDC GLUCOMTR-MCNC: 169 MG/DL — HIGH (ref 70–99)
HCT VFR BLD CALC: 30.6 % — LOW (ref 39–50)
HGB BLD-MCNC: 10.1 G/DL — LOW (ref 13–17)
MCHC RBC-ENTMCNC: 30.2 PG — SIGNIFICANT CHANGE UP (ref 27–34)
MCHC RBC-ENTMCNC: 33 GM/DL — SIGNIFICANT CHANGE UP (ref 32–36)
MCV RBC AUTO: 91.6 FL — SIGNIFICANT CHANGE UP (ref 80–100)
PLATELET # BLD AUTO: 140 K/UL — LOW (ref 150–400)
RBC # BLD: 3.34 M/UL — LOW (ref 4.2–5.8)
RBC # FLD: 16 % — HIGH (ref 10.3–14.5)
WBC # BLD: 5.25 K/UL — SIGNIFICANT CHANGE UP (ref 3.8–10.5)
WBC # FLD AUTO: 5.25 K/UL — SIGNIFICANT CHANGE UP (ref 3.8–10.5)

## 2019-07-01 PROCEDURE — 93971 EXTREMITY STUDY: CPT | Mod: 26,RT

## 2019-07-01 RX ORDER — PANTOPRAZOLE SODIUM 20 MG/1
1 TABLET, DELAYED RELEASE ORAL
Qty: 60 | Refills: 0
Start: 2019-07-01 | End: 2019-07-30

## 2019-07-01 RX ORDER — CIPROFLOXACIN HCL 0.3 %
1 DROPS OPHTHALMIC (EYE)
Qty: 5 | Refills: 0
Start: 2019-07-01 | End: 2019-07-10

## 2019-07-01 RX ORDER — LISINOPRIL 2.5 MG/1
1 TABLET ORAL
Qty: 30 | Refills: 0
Start: 2019-07-01 | End: 2019-07-30

## 2019-07-01 RX ORDER — QUETIAPINE FUMARATE 200 MG/1
1 TABLET, FILM COATED ORAL
Qty: 60 | Refills: 0
Start: 2019-07-01 | End: 2019-07-30

## 2019-07-01 RX ORDER — PANTOPRAZOLE SODIUM 20 MG/1
1 TABLET, DELAYED RELEASE ORAL
Qty: 0 | Refills: 0 | DISCHARGE

## 2019-07-01 RX ORDER — ASPIRIN/CALCIUM CARB/MAGNESIUM 324 MG
1 TABLET ORAL
Qty: 0 | Refills: 0 | DISCHARGE

## 2019-07-01 RX ADMIN — Medication 1 DROP(S): at 05:46

## 2019-07-01 RX ADMIN — Medication 1 DROP(S): at 23:55

## 2019-07-01 RX ADMIN — PANTOPRAZOLE SODIUM 40 MILLIGRAM(S): 20 TABLET, DELAYED RELEASE ORAL at 18:30

## 2019-07-01 RX ADMIN — Medication 1 DROP(S): at 18:29

## 2019-07-01 RX ADMIN — SIMVASTATIN 40 MILLIGRAM(S): 20 TABLET, FILM COATED ORAL at 21:34

## 2019-07-01 RX ADMIN — FINASTERIDE 5 MILLIGRAM(S): 5 TABLET, FILM COATED ORAL at 12:05

## 2019-07-01 RX ADMIN — Medication 2: at 12:04

## 2019-07-01 RX ADMIN — TAMSULOSIN HYDROCHLORIDE 0.4 MILLIGRAM(S): 0.4 CAPSULE ORAL at 21:34

## 2019-07-01 RX ADMIN — PANTOPRAZOLE SODIUM 40 MILLIGRAM(S): 20 TABLET, DELAYED RELEASE ORAL at 05:46

## 2019-07-01 RX ADMIN — LISINOPRIL 20 MILLIGRAM(S): 2.5 TABLET ORAL at 05:46

## 2019-07-01 RX ADMIN — Medication 1 DROP(S): at 12:05

## 2019-07-01 NOTE — DISCHARGE NOTE PROVIDER - HOSPITAL COURSE
PHYSICAL EXAM:        Daily       Daily Weight in k.2 (2019 05:08)        ICU Vital Signs Last 24 Hrs    T(C): 36.5 (2019 16:06), Max: 37.1 (2019 04:47)    T(F): 97.7 (2019 16:06), Max: 98.7 (2019 04:47)    HR: 60 (2019 16:06) (53 - 65)    BP: 160/66 (2019 16:06) (127/90 - 162/78)    BP(mean): --    ABP: --    ABP(mean): --    RR: 18 (2019 16:06) (18 - 19)    SpO2: 98% (2019 16:06) (98% - 99%)            Constitutional: Weak appearing, non verbal    HEENT: Atraumatic, STEFFANIE, Normal, No congestion    Respiratory: Breath Sounds normal, no rhonchi/wheeze    Cardiovascular: N S1S2;     Gastrointestinal: Abdomen soft, non tender, Bowel Sounds present    Extremities: No edema, peripheral pulses present    Neurological: AAO x 0, no gross focal motor deficits    Skin: Non cellulitic, no rash, ulcers    Lymph Nodes: No lymphadenopathy noted    Back: No CVA tenderness     Musculoskeletal: non tender    Breasts: Deferred    Genitourinary: deferred    Rectal: Deferred        1. Anemia due to acute GI hemorrhage:    s/p  1u RBC     Hb stable today 10.1        2. 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 Hypernatremia due to dehydration: resolved        4. Advanced dementia:    start Seroquel for agitation        5. Afib: stable    off anticoag due to above         6. Htn  controlled:    cont Lisinopril         7. RUE dependent edema:    will check sonogram to r/o dvt; no DVT, sup thrombosis        dc to home hospice when set up        time spent 39 min PHYSICAL EXAM:        Daily       Daily Weight in k.2 (2019 05:08)        ICU Vital Signs Last 24 Hrs    T(C): 36.5 (2019 16:06), Max: 37.1 (2019 04:47)    T(F): 97.7 (2019 16:06), Max: 98.7 (2019 04:47)    HR: 60 (2019 16:06) (53 - 65)    BP: 160/66 (2019 16:06) (127/90 - 162/78)    BP(mean): --    ABP: --    ABP(mean): --    RR: 18 (2019 16:06) (18 - 19)    SpO2: 98% (2019 16:06) (98% - 99%)            Constitutional: Weak appearing, non verbal    HEENT: Atraumatic, STEFFANIE, Normal, No congestion    Respiratory: Breath Sounds normal, no rhonchi/wheeze    Cardiovascular: N S1S2;     Gastrointestinal: Abdomen soft, non tender, Bowel Sounds present    Extremities: No edema, peripheral pulses present    Neurological: AAO x 0, no gross focal motor deficits    Skin: Non cellulitic, no rash, ulcers    Lymph Nodes: No lymphadenopathy noted    Back: No CVA tenderness     Musculoskeletal: non tender    Breasts: Deferred    Genitourinary: deferred    Rectal: Deferred        1. Anemia due to acute GI hemorrhage:    s/p  1u RBC     Hb stable today 10.1        2. 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 Hypernatremia due to dehydration: resolved        4. Advanced dementia:    start Seroquel for agitation        5. Afib: stable    off anticoag due to above         6. Htn  controlled:    cont Lisinopril         7. RUE dependent edema:    will check sonogram to r/o dvt; no DVT, sup thrombosis: warm compresses        dc to home hospice when set up        time spent 39 min

## 2019-07-01 NOTE — CONSULT NOTE ADULT - ASSESSMENT
Assessment:   Patient is a 87 y/o M seen by the Podiatry team for the followin. Onychomycosis of toenails 1-5 of the bilateral feet  2. Pain in the right toes 1-5  3. Pain in the left toes 1-5    Plan:  Patient evaluated and chart reviewed by Podiatry dep.   After thorough examination of patient, aseptic mechanical debridement of toenails x 10 was performed using sterile nail clippers. Alcohol prep was used to sterilize the toenails before and after debridement of nails.   Patient tolerated intervention well without any complications  Z-flex boots reapplied to b/l feet to prevent any pressure sores or ulcerations while patient in bed. Patient to wear b/l Z-flex boots at all times when in bed.   Discussed importance of daily foot examinations and proper shoe gear; Discussed diagnosis and treatment with patient.  Patient may follow up in office of Dr. Jones upon discharge.  Patient demonstrated verbal understanding of all interventions.  No further interventions by Podiatry at this time.   Please re-consult as needed. Podiatry appreciates this consult.

## 2019-07-01 NOTE — PROVIDER CONTACT NOTE (OTHER) - SITUATION
Tele Resident number spoke with Ross who will have toe nails cut.  Advised patient being d/c tomorrow.

## 2019-07-01 NOTE — CONSULT NOTE ADULT - SUBJECTIVE AND OBJECTIVE BOX
CC: Painful elongated toenails  Date of Service: 7/1/19    S :  86y year old Male seen at bedside by Podiatry team with a chief complaint of elongated toenails. Patient provides very little information due to his condition but states he has pain to his toenails in the right and left feet. Patient denies having an outside Podiatrist. Patient states he is unable to clip his own toenails due to his medical history. Patient denies any trauma to the right or left feet. Patient has no other pedal complaints at this time. Patient denies any f/n/v/c/sob at this time.       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  Transfusion history  Anemia with chronic illness  SILVERIO (obstructive sleep apnea)  TIA (transient ischemic attack)  CKD (chronic kidney disease)  MR (mitral regurgitation)  Memory loss, short term  Hiatal hernia  DM (diabetes mellitus screen)  HTN (hypertension)  HLD (hyperlipidemia)  BPH (benign prostatic hyperplasia)  Chronic atrial fibrillation    PSH:History of tonsillectomy  H/O heart surgery  H/O cystoscopy  History of hernia surgery  S/P CABG x 4      Allergies:No Known Allergies      Labs:                        10.1   5.25  )-----------( 140      ( 01 Jul 2019 09:52 )             30.6       WBC Trend  5.25 Date (07-01 @ 09:52)  5.33 Date (06-29 @ 08:26)  4.84 Date (06-24 @ 08:35)  5.05 Date (06-23 @ 07:41)  6.29 Date (06-22 @ 18:46)  7.18 Date (06-21 @ 08:09)  8.34 Date (06-21 @ 01:02)  8.16 Date (06-20 @ 22:45)      Vitals:  T(F): 97.6 (07-01-19 @ 21:37), Max: 98.7 (07-01-19 @ 04:47)  HR: 64 (07-01-19 @ 21:37) (53 - 64)  BP: 155/65 (07-01-19 @ 21:37) (155/65 - 162/78)  RR: 18 (07-01-19 @ 21:37) (18 - 19)  SpO2: 99% (07-01-19 @ 21:37) (98% - 99%)  Wt(kg): --    REVIEW OF SYSTEMS:    CONSTITUTIONAL: No weakness, fevers or chills  EYES/ENT: No visual changes;  No vertigo or throat pain   NECK: No pain or stiffness  RESPIRATORY: No cough, wheezing, hemoptysis; No shortness of breath  CARDIOVASCULAR: No chest pain or palpitations  GASTROINTESTINAL: No abdominal or epigastric pain. No nausea, vomiting, or hematemesis; No diarrhea or constipation. No melena or hematochezia.  GENITOURINARY: No dysuria, frequency or hematuria  NEUROLOGICAL: No numbness or weakness  SKIN: Elongated toenails x 10  All other review of systems is negative unless indicated above    O:   Derm:  Skin warm, dry and supple bilateral.  No open lesions or inter-digital macerations noted bilateral.   Toenails 1-5 Right and Left feet thickened, elongated, discolored, and dystrophic with subungual debris. Nails x 10 are yellow in color appearance.   Vascular: Dorsalis Pedis 1/4 b/l and Posterior Tibial pulses non-palpable b/l.  Capillary re-fill time less than 3 seconds digits 1-5 bilateral. No varicosities b/l.  Neuro: Sensation grossly intact to b/l feet.  MSK: Muscle strength 5/5 all major muscle groups bilateral. Pain upon palpation to the toenails x 10. Negative Darnell's and Jean-Claude's signs b/l LEs.

## 2019-07-01 NOTE — DISCHARGE NOTE PROVIDER - CARE PROVIDER_API CALL
Guido Tee)  Internal Medicine; Pulmonary Disease  [address] [place] [address]  Phone: [phone]  Fax: [phone]  Follow Up Time:     Jonathan Obando)  Gastroenterology; Internal Medicine  [address] [place] [address], [address]  [place] [address]  Phone: [phone]  Fax: [phone]  Follow Up Time:

## 2019-07-02 ENCOUNTER — TRANSCRIPTION ENCOUNTER (OUTPATIENT)
Age: 84
End: 2019-07-02

## 2019-07-02 VITALS — WEIGHT: 121.03 LBS

## 2019-07-02 LAB — GLUCOSE BLDC GLUCOMTR-MCNC: 113 MG/DL — HIGH (ref 70–99)

## 2019-07-02 RX ADMIN — FINASTERIDE 5 MILLIGRAM(S): 5 TABLET, FILM COATED ORAL at 09:57

## 2019-07-02 RX ADMIN — Medication 1 DROP(S): at 05:28

## 2019-07-02 RX ADMIN — PANTOPRAZOLE SODIUM 40 MILLIGRAM(S): 20 TABLET, DELAYED RELEASE ORAL at 05:27

## 2019-07-02 RX ADMIN — LISINOPRIL 20 MILLIGRAM(S): 2.5 TABLET ORAL at 05:27

## 2019-07-02 NOTE — DISCHARGE NOTE NURSING/CASE MANAGEMENT/SOCIAL WORK - NSDCDPATPORTLINK_GEN_ALL_CORE
You can access the [facility] Patient Portal, offered by [facility], by registering with the following website: [email or web address]

## 2019-07-08 DIAGNOSIS — I48.2 CHRONIC ATRIAL FIBRILLATION: ICD-10-CM

## 2019-07-08 DIAGNOSIS — N18.3 CHRONIC KIDNEY DISEASE, STAGE 3 (MODERATE): ICD-10-CM

## 2019-07-08 DIAGNOSIS — I13.0 HYPERTENSIVE HEART AND CHRONIC KIDNEY DISEASE WITH HEART FAILURE AND STAGE 1 THROUGH STAGE 4 CHRONIC KIDNEY DISEASE, OR UNSPECIFIED CHRONIC KIDNEY DISEASE: ICD-10-CM

## 2019-07-08 DIAGNOSIS — D62 ACUTE POSTHEMORRHAGIC ANEMIA: ICD-10-CM

## 2019-07-08 DIAGNOSIS — G47.33 OBSTRUCTIVE SLEEP APNEA (ADULT) (PEDIATRIC): ICD-10-CM

## 2019-07-08 DIAGNOSIS — K55.21 ANGIODYSPLASIA OF COLON WITH HEMORRHAGE: ICD-10-CM

## 2019-07-08 DIAGNOSIS — E11.22 TYPE 2 DIABETES MELLITUS WITH DIABETIC CHRONIC KIDNEY DISEASE: ICD-10-CM

## 2019-07-08 DIAGNOSIS — I34.0 NONRHEUMATIC MITRAL (VALVE) INSUFFICIENCY: ICD-10-CM

## 2019-07-08 DIAGNOSIS — K92.2 GASTROINTESTINAL HEMORRHAGE, UNSPECIFIED: ICD-10-CM

## 2019-07-08 DIAGNOSIS — E78.5 HYPERLIPIDEMIA, UNSPECIFIED: ICD-10-CM

## 2019-07-08 DIAGNOSIS — Z86.73 PERSONAL HISTORY OF TRANSIENT ISCHEMIC ATTACK (TIA), AND CEREBRAL INFARCTION WITHOUT RESIDUAL DEFICITS: ICD-10-CM

## 2019-07-08 DIAGNOSIS — E86.0 DEHYDRATION: ICD-10-CM

## 2019-07-08 DIAGNOSIS — B35.1 TINEA UNGUIUM: ICD-10-CM

## 2019-07-08 DIAGNOSIS — M79.674 PAIN IN RIGHT TOE(S): ICD-10-CM

## 2019-07-08 DIAGNOSIS — N40.0 BENIGN PROSTATIC HYPERPLASIA WITHOUT LOWER URINARY TRACT SYMPTOMS: ICD-10-CM

## 2019-07-08 DIAGNOSIS — E43 UNSPECIFIED SEVERE PROTEIN-CALORIE MALNUTRITION: ICD-10-CM

## 2019-07-08 DIAGNOSIS — F03.90 UNSPECIFIED DEMENTIA WITHOUT BEHAVIORAL DISTURBANCE: ICD-10-CM

## 2019-07-08 DIAGNOSIS — I25.10 ATHEROSCLEROTIC HEART DISEASE OF NATIVE CORONARY ARTERY WITHOUT ANGINA PECTORIS: ICD-10-CM

## 2019-07-08 DIAGNOSIS — Z95.1 PRESENCE OF AORTOCORONARY BYPASS GRAFT: ICD-10-CM

## 2019-07-08 DIAGNOSIS — E87.0 HYPEROSMOLALITY AND HYPERNATREMIA: ICD-10-CM

## 2019-07-08 DIAGNOSIS — M79.675 PAIN IN LEFT TOE(S): ICD-10-CM

## 2019-07-08 DIAGNOSIS — Z79.82 LONG TERM (CURRENT) USE OF ASPIRIN: ICD-10-CM

## 2019-09-16 ENCOUNTER — MEDICATION RENEWAL (OUTPATIENT)
Age: 84
End: 2019-09-16

## 2019-10-28 ENCOUNTER — MEDICATION RENEWAL (OUTPATIENT)
Age: 84
End: 2019-10-28

## 2019-11-12 ENCOUNTER — RX RENEWAL (OUTPATIENT)
Age: 84
End: 2019-11-12

## 2020-03-25 RX ORDER — GLIMEPIRIDE 1 MG/1
1 TABLET ORAL DAILY
Qty: 90 | Refills: 0 | Status: ACTIVE | COMMUNITY
Start: 2018-07-02 | End: 1900-01-01

## 2020-04-07 RX ORDER — PANTOPRAZOLE 40 MG/1
40 TABLET, DELAYED RELEASE ORAL
Qty: 90 | Refills: 3 | Status: ACTIVE | OUTPATIENT
Start: 2019-11-12

## 2021-09-27 NOTE — ED ADULT NURSE NOTE - IN THE PAST YEAR, HOW OFTEN HAVE YOU USED TOBACCO PRODUCTS?
Never [Visual inspection, sensory exam] : Foot exam, including visual inspection, sensory exam with mono filament, and pulse exam, was performed within the last 12 months

## 2021-10-06 PROBLEM — I10 ESSENTIAL HYPERTENSION: Status: ACTIVE | Noted: 2017-05-03

## 2022-04-14 NOTE — H&P ADULT - NSHPOUTPATIENTPROVIDERS_GEN_ALL_CORE
PROCEDURE NOTE: Lucentis 0.5mg PFS OD. Diagnosis: Neovascular AMD with Active CNV. Anesthesia: Topical. Prep: Betadine Flush. Prior to injection, risks/benefits/alternatives discussed including but not limited to infection, loss of vision or eye, hemorrhage, cataract, glaucoma, retinal tears or detachment. The patient wished to proceed with treatment. Topical anesthesia was induced with Alcaine. Additional anesthesia was achieved using drop(s) or injection checked above. A drop of Povidone-iodine 5% ophthalmic solution was instilled over the injection site and in the inferior fornix. Betadine prep was performed. A single use prefilled syringe of intravitreal Lucentis 0.5mg/0.05ml was used and excess was disposed of as waste. The needle was passed 3.0 mm posterior to the limbus in pseudophakic patients, and 3.5 mm posterior to the limbus in phakic patients. Injection Time 2:32. Patient tolerated the procedure well. There were no complications. The eye was irrigated with sterile irrigating solution. Post procedure instructions given. The patient was instructed to use Artificial Tears q.i.d. p.r.n for comfort. The patient was instructed to return for re-evaluation in approximately 4-12 weeks depending on his/her condition and was told to call immediately if vision decreases and/or if his/her eye becomes red, painful, and/or light sensitive. The patient was instructed to go to the emergency room or call 911 if unable to reach the doctor within an hour or two of trying or calling. Daniel Alexandra PROCEDURE NOTE: Lucentis 0.5mg PFS OS. Diagnosis: Neovascular AMD with Active CNV. Anesthesia: Topical. Prep: Betadine Flush. Prior to injection, risks/benefits/alternatives discussed including but not limited to infection, loss of vision or eye, hemorrhage, cataract, glaucoma, retinal tears or detachment. The patient wished to proceed with treatment. Topical anesthesia was induced with Alcaine. Additional anesthesia was achieved using drop(s) or injection checked above. A drop of Povidone-iodine 5% ophthalmic solution was instilled over the injection site and in the inferior fornix. Betadine prep was performed. A single use prefilled syringe of intravitreal Lucentis 0.5mg/0.05ml was used and excess was disposed of as waste. The needle was passed 3.0 mm posterior to the limbus in pseudophakic patients, and 3.5 mm posterior to the limbus in phakic patients. Injection Time 2:33. Patient tolerated the procedure well. There were no complications. The eye was irrigated with sterile irrigating solution. Post procedure instructions given. The patient was instructed to use Artificial Tears q.i.d. p.r.n for comfort. The patient was instructed to return for re-evaluation in approximately 4-12 weeks depending on his/her condition and was told to call immediately if vision decreases and/or if his/her eye becomes red, painful, and/or light sensitive. The patient was instructed to go to the emergency room or call 911 if unable to reach the doctor within an hour or two of trying or calling. Daniel Alexandra PMD: Dr. Tee  Cardio: Dr. Maldonado

## 2022-08-04 NOTE — ED ADULT TRIAGE NOTE - NS ED NURSE AMBULANCES
Cj Shabazz Patient Age: 66 year old  MESSAGE:   Patient calling to follow up on xray results from 8/3. Please advise once available. Call transferred to clinical staff.      WEIGHT AND HEIGHT:   Wt Readings from Last 1 Encounters:   08/03/22 61.2 kg (135 lb)     Ht Readings from Last 1 Encounters:   08/03/22 5' 5\" (1.651 m)     BMI Readings from Last 1 Encounters:   08/03/22 22.47 kg/m²       ALLERGIES:  Iodinated diagnostic agents  Current Outpatient Medications   Medication Sig Dispense Refill   • omeprazole (PrilOSEC) 20 MG capsule Take 1 capsule by mouth 2 times daily. 180 capsule 3   • betamethasone dipropionate augmented (DIPROLENE) 0.05 % ointment Apply topically 2 times daily. Apply qid affected areas 30 g 3   • lisinopril (ZESTRIL) 5 MG tablet Take 1 tablet by mouth daily. 90 tablet 1   • pravastatin (PRAVACHOL) 80 MG tablet Take 1 tablet by mouth daily. 90 tablet 1   • mometasone (ELOCON) 0.1 % cream Apply bid to rash, only a few days neck 45 g 3   • Omeprazole (PRILOSEC PO)        No current facility-administered medications for this visit.     PHARMACY to use:           Pharmacy preference(s) on file:   [facility] [address] [place], [place] - [address]  [address]  [facility] [address]  Phone: [phone] Fax: [phone]      CALL BACK INFO: Ok to leave response (including medical information) with family member or on answering machine  ROUTING: Patient's physician/staff        PCP: Franny Zepeda MD         INS: Payor: MEDICARE / Plan: PARTA AND B / Product Type: MEDICARE   PATIENT ADDRESS:  [address] [address]   [facility]

## 2022-08-26 NOTE — DISCHARGE NOTE ADULT - KEEP YOUR INTAKE OF VITAMIN K REGULAR. THE HIGHEST AMOUNT OF VITAMIN K IS FOUND IN GREEN AND LEAFY VEGETABLES LIKE BROCCOLI, LETTUCES, CABBAGE, AND SPINACH. YOU CAN EAT THESE FOODS BUT KEEP THE PORTION
The skin at the access site was anesthetized. Using a micropuncture needle the right radial artery was succesfully accessed in a retrograde fashion over the guidewire, under fluoroscopic guidance using a Needle Proc 21ga 2.5cm [facility].  Statement Selected

## 2024-07-16 NOTE — ED ADULT NURSE NOTE - PAIN: BODY LOCATION
Please drink plenty of fluids.  Please get plenty of rest.  Please return here or go to the Emergency Department for any concerns or worsening of condition.  If you were prescribed a narcotic medication, do not drive or operate heavy equipment or machinery while taking these medications.  CALL [phone] TO SCHEDULE APPOINTMENT WITH SPECIALIST IF YOU HAVE NOT RECEIVED A CALL BY EITHER TODAY OR BY TOMORROW.   Only wear finger splint during the day. Continue to ice twice daily.   If you were not prescribed an anti-inflammatory medication, and if you do not have any history of stomach/intestinal ulcers, or kidney disease, or are not taking a blood thinner such as Coumadin, Plavix, Pradaxa, Eloquis, or Xaralta for example, it is OK to take over the counter Ibuprofen or Advil or Motrin or Aleve as directed.  Do not take these medications on an empty stomach.  Rest, ice, compression and elevation to the affected joint or limb as needed.  Please follow up with your primary care doctor or specialist as needed.    If you  smoke, please stop smoking.     posterior/head